# Patient Record
Sex: FEMALE | Race: WHITE | NOT HISPANIC OR LATINO | Employment: OTHER | URBAN - METROPOLITAN AREA
[De-identification: names, ages, dates, MRNs, and addresses within clinical notes are randomized per-mention and may not be internally consistent; named-entity substitution may affect disease eponyms.]

---

## 2017-02-01 ENCOUNTER — HISTORICAL (OUTPATIENT)
Dept: LAB | Facility: HOSPITAL | Age: 57
End: 2017-02-01

## 2017-07-03 ENCOUNTER — HISTORICAL (OUTPATIENT)
Dept: LAB | Facility: HOSPITAL | Age: 57
End: 2017-07-03

## 2017-07-03 LAB
EST. AVERAGE GLUCOSE BLD GHB EST-MCNC: 105 MG/DL
HBA1C MFR BLD: 5.3 % (ref 4.5–6.2)

## 2017-08-22 ENCOUNTER — HISTORICAL (OUTPATIENT)
Dept: RADIOLOGY | Facility: HOSPITAL | Age: 57
End: 2017-08-22

## 2017-12-08 ENCOUNTER — HISTORICAL (OUTPATIENT)
Dept: RADIOLOGY | Facility: HOSPITAL | Age: 57
End: 2017-12-08

## 2017-12-08 LAB
ABS NEUT (OLG): 6.36
ALBUMIN SERPL-MCNC: 3.6 GM/DL (ref 3.4–5)
ALBUMIN/GLOB SERPL: 1.1 RATIO (ref 1.1–2)
ALP SERPL-CCNC: 88 UNIT/L (ref 46–116)
ALT SERPL-CCNC: 25 UNIT/L (ref 12–78)
AST SERPL-CCNC: 14 UNIT/L (ref 10–37)
BASOPHILS # BLD AUTO: 0.03 X10(3)/MCL
BASOPHILS NFR BLD AUTO: 0.3 %
BILIRUB SERPL-MCNC: 0.3 MG/DL (ref 0.2–1)
BILIRUBIN DIRECT+TOT PNL SERPL-MCNC: 0.07 MG/DL (ref 0–0.2)
BILIRUBIN DIRECT+TOT PNL SERPL-MCNC: 0.22 MG/DL
BUN SERPL-MCNC: 21 MG/DL (ref 7–18)
CALCIUM SERPL-MCNC: 9.3 MG/DL (ref 8.5–10.1)
CHLORIDE SERPL-SCNC: 101 MMOL/L (ref 98–107)
CO2 SERPL-SCNC: 28.1 MMOL/L (ref 21–32)
CREAT SERPL-MCNC: 1.02 MG/DL (ref 0.55–1.02)
EOSINOPHIL # BLD AUTO: 0.33 X10(3)/MCL
EOSINOPHIL NFR BLD AUTO: 3.5 %
ERYTHROCYTE [DISTWIDTH] IN BLOOD BY AUTOMATED COUNT: 12 %
GLOBULIN SER-MCNC: 3.4 GM/DL (ref 2.4–3.5)
GLUCOSE SERPL-MCNC: 93 MG/DL (ref 74–106)
HCT VFR BLD AUTO: 41.4 % (ref 34–46)
HGB BLD-MCNC: 14 GM/DL (ref 11.3–15.4)
IMM GRANULOCYTES # BLD AUTO: 0.04 10*3/UL (ref 0–0.1)
IMM GRANULOCYTES NFR BLD AUTO: 0.4 % (ref 0–1)
LYMPHOCYTES # BLD AUTO: 1.95 X10(3)/MCL
LYMPHOCYTES NFR BLD AUTO: 20.6 %
MCH RBC QN AUTO: 31.3 PG (ref 27–33)
MCHC RBC AUTO-ENTMCNC: 33.8 GM/DL (ref 32–35)
MCV RBC AUTO: 92.6 FL (ref 81–97)
MONOCYTES # BLD AUTO: 0.77 X10(3)/MCL
MONOCYTES NFR BLD AUTO: 8.1 %
NEUTROPHILS # BLD AUTO: 6.36 X10(3)/MCL
NEUTROPHILS NFR BLD AUTO: 67.1 %
PLATELET # BLD AUTO: 208 X10(3)/MCL (ref 151–368)
PMV BLD AUTO: 10 FL
POTASSIUM SERPL-SCNC: 4 MMOL/L (ref 3.5–5.1)
PROT SERPL-MCNC: 7 GM/DL (ref 6.4–8.2)
RBC # BLD AUTO: 4.47 X10(6)/MCL (ref 3.9–5)
SODIUM SERPL-SCNC: 138 MMOL/L (ref 136–145)
WBC # SPEC AUTO: 9.48 X10(3)/MCL (ref 3.4–9.2)

## 2017-12-11 ENCOUNTER — HISTORICAL (OUTPATIENT)
Dept: LAB | Facility: HOSPITAL | Age: 57
End: 2017-12-11

## 2018-02-23 ENCOUNTER — HISTORICAL (OUTPATIENT)
Dept: LAB | Facility: HOSPITAL | Age: 58
End: 2018-02-23

## 2018-02-23 LAB
CHOLEST SERPL-MCNC: 250 MG/DL (ref 50–200)
CHOLEST/HDLC SERPL: 3 {RATIO} (ref 0–5)
HDLC SERPL-MCNC: 81 MG/DL (ref 35–60)
LDLC SERPL CALC-MCNC: 140 MG/DL (ref 50–140)
TRIGL SERPL-MCNC: 145 MG/DL (ref 30–150)
VLDLC SERPL CALC-MCNC: 29 MG/DL

## 2018-03-15 ENCOUNTER — HISTORICAL (OUTPATIENT)
Dept: RADIOLOGY | Facility: HOSPITAL | Age: 58
End: 2018-03-15

## 2018-03-23 ENCOUNTER — HISTORICAL (OUTPATIENT)
Dept: LAB | Facility: HOSPITAL | Age: 58
End: 2018-03-23

## 2018-03-23 LAB — PTH-INTACT SERPL-MCNC: 40.7 PG/ML (ref 18.4–80.1)

## 2018-04-12 ENCOUNTER — HISTORICAL (OUTPATIENT)
Dept: RADIOLOGY | Facility: HOSPITAL | Age: 58
End: 2018-04-12

## 2018-06-20 ENCOUNTER — HISTORICAL (OUTPATIENT)
Dept: LAB | Facility: HOSPITAL | Age: 58
End: 2018-06-20

## 2018-06-20 LAB
ABS NEUT (OLG): 5.28
ALBUMIN SERPL-MCNC: 3.7 GM/DL (ref 3.4–5)
ALBUMIN/GLOB SERPL: 1 RATIO (ref 1.1–2)
ALP SERPL-CCNC: 108 UNIT/L (ref 46–116)
ALT SERPL-CCNC: 28 UNIT/L (ref 12–78)
AST SERPL-CCNC: 16 UNIT/L (ref 10–37)
BASOPHILS # BLD AUTO: 0.07 X10(3)/MCL
BASOPHILS NFR BLD AUTO: 0.8 %
BILIRUB SERPL-MCNC: 0.3 MG/DL (ref 0.2–1)
BILIRUBIN DIRECT+TOT PNL SERPL-MCNC: 0.07 MG/DL (ref 0–0.2)
BILIRUBIN DIRECT+TOT PNL SERPL-MCNC: 0.23 MG/DL
BUN SERPL-MCNC: 17 MG/DL (ref 7–18)
CALCIUM SERPL-MCNC: 9.5 MG/DL (ref 8.5–10.1)
CHLORIDE SERPL-SCNC: 99 MMOL/L (ref 98–107)
CO2 SERPL-SCNC: 29.7 MMOL/L (ref 21–32)
CREAT SERPL-MCNC: 1.01 MG/DL (ref 0.55–1.02)
EOSINOPHIL # BLD AUTO: 0.47 X10(3)/MCL
EOSINOPHIL NFR BLD AUTO: 5.3 %
ERYTHROCYTE [DISTWIDTH] IN BLOOD BY AUTOMATED COUNT: 12 %
GLOBULIN SER-MCNC: 3.8 GM/DL (ref 2.4–3.5)
GLUCOSE SERPL-MCNC: 113 MG/DL (ref 74–106)
HCT VFR BLD AUTO: 42.9 % (ref 34–46)
HGB BLD-MCNC: 14.9 GM/DL (ref 11.3–15.4)
IMM GRANULOCYTES # BLD AUTO: 0.04 10*3/UL (ref 0–0.1)
IMM GRANULOCYTES NFR BLD AUTO: 0.5 % (ref 0–1)
LYMPHOCYTES # BLD AUTO: 2.14 X10(3)/MCL
LYMPHOCYTES NFR BLD AUTO: 24.3 %
MCH RBC QN AUTO: 31.8 PG (ref 27–33)
MCHC RBC AUTO-ENTMCNC: 34.7 GM/DL (ref 32–35)
MCV RBC AUTO: 91.5 FL (ref 81–97)
MONOCYTES # BLD AUTO: 0.81 X10(3)/MCL
MONOCYTES NFR BLD AUTO: 9.2 %
NEUTROPHILS # BLD AUTO: 5.28 X10(3)/MCL
NEUTROPHILS NFR BLD AUTO: 59.9 %
PLATELET # BLD AUTO: 214 X10(3)/MCL (ref 151–368)
PMV BLD AUTO: 10 FL
POTASSIUM SERPL-SCNC: 3.9 MMOL/L (ref 3.5–5.1)
PROT SERPL-MCNC: 7.5 GM/DL (ref 6.4–8.2)
RBC # BLD AUTO: 4.69 X10(6)/MCL (ref 3.9–5)
SODIUM SERPL-SCNC: 136 MMOL/L (ref 136–145)
WBC # SPEC AUTO: 8.81 X10(3)/MCL (ref 3.4–9.2)

## 2018-08-04 ENCOUNTER — HISTORICAL (OUTPATIENT)
Dept: LAB | Facility: HOSPITAL | Age: 58
End: 2018-08-04

## 2018-08-04 LAB
ALBUMIN SERPL-MCNC: 3.5 GM/DL (ref 3.4–5)
ALBUMIN/GLOB SERPL: 0.9 RATIO (ref 1.1–2)
ALP SERPL-CCNC: 106 UNIT/L (ref 46–116)
ALT SERPL-CCNC: 24 UNIT/L (ref 12–78)
AST SERPL-CCNC: 11 UNIT/L (ref 10–37)
BILIRUB SERPL-MCNC: 0.2 MG/DL (ref 0.2–1)
BILIRUBIN DIRECT+TOT PNL SERPL-MCNC: 0.1 MG/DL
BILIRUBIN DIRECT+TOT PNL SERPL-MCNC: 0.1 MG/DL (ref 0–0.2)
BUN SERPL-MCNC: 23 MG/DL (ref 7–18)
CALCIUM SERPL-MCNC: 9.2 MG/DL (ref 8.5–10.1)
CHLORIDE SERPL-SCNC: 104 MMOL/L (ref 98–107)
CHOLEST SERPL-MCNC: 251 MG/DL (ref 50–200)
CHOLEST/HDLC SERPL: 3 {RATIO} (ref 0–5)
CO2 SERPL-SCNC: 30.7 MMOL/L (ref 21–32)
CREAT SERPL-MCNC: 1.06 MG/DL (ref 0.55–1.02)
GLOBULIN SER-MCNC: 3.7 GM/DL (ref 2.4–3.5)
GLUCOSE SERPL-MCNC: 135 MG/DL (ref 74–106)
HDLC SERPL-MCNC: 82 MG/DL (ref 35–60)
LDLC SERPL CALC-MCNC: 147 MG/DL (ref 50–140)
POTASSIUM SERPL-SCNC: 4 MMOL/L (ref 3.5–5.1)
PROT SERPL-MCNC: 7.2 GM/DL (ref 6.4–8.2)
SODIUM SERPL-SCNC: 139 MMOL/L (ref 136–145)
TRIGL SERPL-MCNC: 108 MG/DL (ref 30–150)
VLDLC SERPL CALC-MCNC: 22 MG/DL

## 2018-09-18 ENCOUNTER — HISTORICAL (OUTPATIENT)
Dept: RADIOLOGY | Facility: HOSPITAL | Age: 58
End: 2018-09-18

## 2019-01-04 ENCOUNTER — HISTORICAL (OUTPATIENT)
Dept: LAB | Facility: HOSPITAL | Age: 59
End: 2019-01-04

## 2019-01-04 LAB
ABS NEUT (OLG): 4.49
ALBUMIN SERPL-MCNC: 3.9 GM/DL (ref 3.4–5)
ALBUMIN/GLOB SERPL: 1.1 RATIO (ref 1.1–2)
ALP SERPL-CCNC: 94 UNIT/L (ref 46–116)
ALT SERPL-CCNC: 33 UNIT/L (ref 12–78)
AST SERPL-CCNC: 18 UNIT/L (ref 10–37)
BASOPHILS # BLD AUTO: 0.04 X10(3)/MCL
BASOPHILS NFR BLD AUTO: 0.5 %
BILIRUB SERPL-MCNC: 0.4 MG/DL (ref 0.2–1)
BILIRUBIN DIRECT+TOT PNL SERPL-MCNC: 0.12 MG/DL (ref 0–0.2)
BILIRUBIN DIRECT+TOT PNL SERPL-MCNC: 0.28 MG/DL
BUN SERPL-MCNC: 20 MG/DL (ref 7–18)
CALCIUM SERPL-MCNC: 9.7 MG/DL (ref 8.5–10.1)
CHLORIDE SERPL-SCNC: 101 MMOL/L (ref 98–107)
CO2 SERPL-SCNC: 32.6 MMOL/L (ref 21–32)
CREAT SERPL-MCNC: 1.08 MG/DL (ref 0.55–1.02)
EOSINOPHIL # BLD AUTO: 0.29 X10(3)/MCL
EOSINOPHIL NFR BLD AUTO: 3.6 %
ERYTHROCYTE [DISTWIDTH] IN BLOOD BY AUTOMATED COUNT: 13 %
GLOBULIN SER-MCNC: 3.7 GM/DL (ref 2.4–3.5)
GLUCOSE SERPL-MCNC: 82 MG/DL (ref 74–106)
HCT VFR BLD AUTO: 45 % (ref 34–46)
HGB BLD-MCNC: 15.2 GM/DL (ref 11.3–15.4)
IMM GRANULOCYTES # BLD AUTO: 0.03 10*3/UL (ref 0–0.1)
IMM GRANULOCYTES NFR BLD AUTO: 0.4 % (ref 0–1)
LYMPHOCYTES # BLD AUTO: 2.4 X10(3)/MCL
LYMPHOCYTES NFR BLD AUTO: 30.2 %
MCH RBC QN AUTO: 31.8 PG (ref 27–33)
MCHC RBC AUTO-ENTMCNC: 33.8 GM/DL (ref 32–35)
MCV RBC AUTO: 94.1 FL (ref 81–97)
MONOCYTES # BLD AUTO: 0.7 X10(3)/MCL
MONOCYTES NFR BLD AUTO: 8.8 %
NEUTROPHILS # BLD AUTO: 4.49 X10(3)/MCL
NEUTROPHILS NFR BLD AUTO: 56.5 %
PLATELET # BLD AUTO: 202 X10(3)/MCL (ref 151–368)
PMV BLD AUTO: 10 FL
POTASSIUM SERPL-SCNC: 3.6 MMOL/L (ref 3.5–5.1)
PROT SERPL-MCNC: 7.6 GM/DL (ref 6.4–8.2)
RBC # BLD AUTO: 4.78 X10(6)/MCL (ref 3.9–5)
SODIUM SERPL-SCNC: 139 MMOL/L (ref 136–145)
WBC # SPEC AUTO: 7.95 X10(3)/MCL (ref 3.4–9.2)

## 2019-02-28 ENCOUNTER — HISTORICAL (OUTPATIENT)
Dept: RADIOLOGY | Facility: HOSPITAL | Age: 59
End: 2019-02-28

## 2019-03-19 ENCOUNTER — HISTORICAL (OUTPATIENT)
Dept: RADIOLOGY | Facility: HOSPITAL | Age: 59
End: 2019-03-19

## 2019-05-06 ENCOUNTER — HISTORICAL (OUTPATIENT)
Dept: RADIOLOGY | Facility: HOSPITAL | Age: 59
End: 2019-05-06

## 2019-08-15 ENCOUNTER — HISTORICAL (OUTPATIENT)
Dept: LAB | Facility: HOSPITAL | Age: 59
End: 2019-08-15

## 2019-08-15 LAB
ALBUMIN SERPL-MCNC: 3.6 GM/DL (ref 3.4–5)
ALBUMIN/GLOB SERPL: 1 RATIO (ref 1.1–2)
ALP SERPL-CCNC: 95 UNIT/L (ref 46–116)
ALT SERPL-CCNC: 32 UNIT/L (ref 12–78)
AST SERPL-CCNC: 16 UNIT/L (ref 10–37)
BILIRUB SERPL-MCNC: 0.4 MG/DL (ref 0.2–1)
BILIRUBIN DIRECT+TOT PNL SERPL-MCNC: 0.14 MG/DL (ref 0–0.2)
BILIRUBIN DIRECT+TOT PNL SERPL-MCNC: 0.26 MG/DL
BUN SERPL-MCNC: 16 MG/DL (ref 7–18)
CALCIUM SERPL-MCNC: 9.2 MG/DL (ref 8.5–10.1)
CHLORIDE SERPL-SCNC: 103 MMOL/L (ref 98–107)
CHOLEST SERPL-MCNC: 249 MG/DL (ref 50–200)
CHOLEST/HDLC SERPL: 3 {RATIO} (ref 0–5)
CO2 SERPL-SCNC: 30.1 MMOL/L (ref 21–32)
CREAT SERPL-MCNC: 0.88 MG/DL (ref 0.55–1.02)
GLOBULIN SER-MCNC: 3.7 GM/DL (ref 2.4–3.5)
GLUCOSE SERPL-MCNC: 112 MG/DL (ref 74–106)
HDLC SERPL-MCNC: 75 MG/DL (ref 35–60)
LDLC SERPL CALC-MCNC: 149 MG/DL (ref 50–140)
POTASSIUM SERPL-SCNC: 3.7 MMOL/L (ref 3.5–5.1)
PROT SERPL-MCNC: 7.3 GM/DL (ref 6.4–8.2)
SODIUM SERPL-SCNC: 141 MMOL/L (ref 136–145)
TRIGL SERPL-MCNC: 125 MG/DL (ref 30–150)
VLDLC SERPL CALC-MCNC: 25 MG/DL

## 2019-11-06 ENCOUNTER — HISTORICAL (OUTPATIENT)
Dept: LAB | Facility: HOSPITAL | Age: 59
End: 2019-11-06

## 2019-11-06 LAB
ABS NEUT (OLG): 5.49
ALBUMIN SERPL-MCNC: 3.6 GM/DL (ref 3.4–5)
ALBUMIN/GLOB SERPL: 1 RATIO (ref 1.1–2)
ALP SERPL-CCNC: 98 UNIT/L (ref 46–116)
ALT SERPL-CCNC: 34 UNIT/L (ref 12–78)
AST SERPL-CCNC: 21 UNIT/L (ref 10–37)
BASOPHILS # BLD AUTO: 0.05 X10(3)/MCL
BASOPHILS NFR BLD AUTO: 0.6 %
BILIRUB SERPL-MCNC: 0.4 MG/DL (ref 0.2–1)
BILIRUBIN DIRECT+TOT PNL SERPL-MCNC: 0.1 MG/DL (ref 0–0.2)
BILIRUBIN DIRECT+TOT PNL SERPL-MCNC: 0.3 MG/DL
BUN SERPL-MCNC: 18 MG/DL (ref 7–18)
CALCIUM SERPL-MCNC: 9.8 MG/DL (ref 8.5–10.1)
CHLORIDE SERPL-SCNC: 100 MMOL/L (ref 98–107)
CO2 SERPL-SCNC: 31.8 MMOL/L (ref 21–32)
CREAT SERPL-MCNC: 0.86 MG/DL (ref 0.55–1.02)
EOSINOPHIL # BLD AUTO: 0.25 X10(3)/MCL
EOSINOPHIL NFR BLD AUTO: 2.9 %
ERYTHROCYTE [DISTWIDTH] IN BLOOD BY AUTOMATED COUNT: 13 %
GLOBULIN SER-MCNC: 3.7 GM/DL (ref 2.4–3.5)
GLUCOSE SERPL-MCNC: 83 MG/DL (ref 74–106)
HCT VFR BLD AUTO: 42.9 % (ref 34–46)
HGB BLD-MCNC: 14.5 GM/DL (ref 11.3–15.4)
IMM GRANULOCYTES # BLD AUTO: 0.04 10*3/UL (ref 0–0.1)
IMM GRANULOCYTES NFR BLD AUTO: 0.5 % (ref 0–1)
LYMPHOCYTES # BLD AUTO: 2.15 X10(3)/MCL
LYMPHOCYTES NFR BLD AUTO: 24.7 %
MCH RBC QN AUTO: 31.4 PG (ref 27–33)
MCHC RBC AUTO-ENTMCNC: 33.8 GM/DL (ref 32–35)
MCV RBC AUTO: 92.9 FL (ref 81–97)
MONOCYTES # BLD AUTO: 0.71 X10(3)/MCL
MONOCYTES NFR BLD AUTO: 8.2 %
NEUTROPHILS # BLD AUTO: 5.49 X10(3)/MCL
NEUTROPHILS NFR BLD AUTO: 63.1 %
PLATELET # BLD AUTO: 223 X10(3)/MCL (ref 140–450)
PMV BLD AUTO: 10 FL
POTASSIUM SERPL-SCNC: 3.8 MMOL/L (ref 3.5–5.1)
PROT SERPL-MCNC: 7.3 GM/DL (ref 6.4–8.2)
RBC # BLD AUTO: 4.62 X10(6)/MCL (ref 3.9–5)
SODIUM SERPL-SCNC: 138 MMOL/L (ref 136–145)
WBC # SPEC AUTO: 8.69 X10(3)/MCL (ref 3.4–9.2)

## 2020-02-17 ENCOUNTER — HISTORICAL (OUTPATIENT)
Dept: LAB | Facility: HOSPITAL | Age: 60
End: 2020-02-17

## 2020-02-17 LAB
ALBUMIN SERPL-MCNC: 3.9 GM/DL (ref 3.5–5.2)
ALBUMIN/GLOB SERPL: 1.1 RATIO (ref 1.1–2)
ALP SERPL-CCNC: 95 UNIT/L (ref 40–150)
ALT SERPL-CCNC: 21 UNIT/L (ref 0–55)
AST SERPL-CCNC: 15 UNIT/L (ref 5–34)
BILIRUB SERPL-MCNC: 0.5 MG/DL
BILIRUBIN DIRECT+TOT PNL SERPL-MCNC: 0.2 MG/DL (ref 0–0.5)
BILIRUBIN DIRECT+TOT PNL SERPL-MCNC: 0.3 MG/DL
BUN SERPL-MCNC: 16 MG/DL (ref 9.8–20.1)
CALCIUM SERPL-MCNC: 9.9 MG/DL (ref 8.4–10.2)
CHLORIDE SERPL-SCNC: 99 MMOL/L (ref 98–107)
CHOLEST SERPL-MCNC: 213 MG/DL
CHOLEST/HDLC SERPL: 3 {RATIO} (ref 0–5)
CO2 SERPL-SCNC: 30 MEQ/L (ref 22–29)
CREAT SERPL-MCNC: 0.73 MG/DL (ref 0.55–1.02)
GLOBULIN SER-MCNC: 3.4 GM/DL (ref 2.4–3.5)
GLUCOSE SERPL-MCNC: 107 MG/DL (ref 74–100)
HDLC SERPL-MCNC: 73 MG/DL
LDLC SERPL CALC-MCNC: 102 MG/DL (ref 50–140)
POTASSIUM SERPL-SCNC: 3.5 MMOL/L (ref 3.5–5.1)
PROT SERPL-MCNC: 7.3 GM/DL (ref 6.4–8.3)
SODIUM SERPL-SCNC: 138 MMOL/L (ref 136–145)
TRIGL SERPL-MCNC: 190 MG/DL (ref 37–140)
VLDLC SERPL CALC-MCNC: 38 MG/DL

## 2020-05-28 ENCOUNTER — HISTORICAL (OUTPATIENT)
Dept: LAB | Facility: HOSPITAL | Age: 60
End: 2020-05-28

## 2020-05-28 ENCOUNTER — HISTORICAL (OUTPATIENT)
Dept: RADIOLOGY | Facility: HOSPITAL | Age: 60
End: 2020-05-28

## 2020-05-28 LAB
ABS NEUT (OLG): 5.96
ALBUMIN SERPL-MCNC: 3.9 GM/DL (ref 3.5–5)
ALBUMIN/GLOB SERPL: 1.1 RATIO (ref 1.1–2)
ALP SERPL-CCNC: 101 UNIT/L (ref 40–150)
ALP SERPL-CCNC: 103 UNIT/L (ref 40–150)
ALT SERPL-CCNC: 26 UNIT/L (ref 0–55)
AST SERPL-CCNC: 17 UNIT/L (ref 5–34)
BASOPHILS # BLD AUTO: 0.04 X10(3)/MCL
BASOPHILS NFR BLD AUTO: 0.4 %
BILIRUB SERPL-MCNC: 0.3 MG/DL
BILIRUBIN DIRECT+TOT PNL SERPL-MCNC: 0.1 MG/DL (ref 0–0.5)
BILIRUBIN DIRECT+TOT PNL SERPL-MCNC: 0.2 MG/DL
BUN SERPL-MCNC: 17 MG/DL (ref 9.8–20.1)
CALCIUM SERPL-MCNC: 9.5 MG/DL (ref 8.4–10.2)
CHLORIDE SERPL-SCNC: 102 MMOL/L (ref 98–107)
CO2 SERPL-SCNC: 30 MEQ/L (ref 22–29)
CREAT SERPL-MCNC: 0.94 MG/DL (ref 0.55–1.02)
EOSINOPHIL # BLD AUTO: 0.44 X10(3)/MCL
EOSINOPHIL NFR BLD AUTO: 4.7 %
ERYTHROCYTE [DISTWIDTH] IN BLOOD BY AUTOMATED COUNT: 13 %
GLOBULIN SER-MCNC: 3.4 GM/DL (ref 2.4–3.5)
GLUCOSE SERPL-MCNC: 106 MG/DL (ref 74–100)
HCT VFR BLD AUTO: 47.4 % (ref 34–46)
HGB BLD-MCNC: 16 GM/DL (ref 11.3–15.4)
IMM GRANULOCYTES # BLD AUTO: 0.04 10*3/UL (ref 0–0.1)
IMM GRANULOCYTES NFR BLD AUTO: 0.4 % (ref 0–1)
LYMPHOCYTES # BLD AUTO: 2.13 X10(3)/MCL
LYMPHOCYTES NFR BLD AUTO: 22.7 %
MCH RBC QN AUTO: 31.7 PG (ref 27–33)
MCHC RBC AUTO-ENTMCNC: 33.8 GM/DL (ref 32–35)
MCV RBC AUTO: 93.9 FL (ref 81–97)
MONOCYTES # BLD AUTO: 0.79 X10(3)/MCL
MONOCYTES NFR BLD AUTO: 8.4 %
NEUTROPHILS # BLD AUTO: 5.96 X10(3)/MCL
NEUTROPHILS NFR BLD AUTO: 63.4 %
PLATELET # BLD AUTO: 233 X10(3)/MCL (ref 140–450)
PMV BLD AUTO: 11 FL
POTASSIUM SERPL-SCNC: 4.2 MMOL/L (ref 3.5–5.1)
PROT SERPL-MCNC: 7.3 GM/DL (ref 6.4–8.3)
RBC # BLD AUTO: 5.05 X10(6)/MCL (ref 3.9–5)
SODIUM SERPL-SCNC: 140 MMOL/L (ref 136–145)
WBC # SPEC AUTO: 9.4 X10(3)/MCL (ref 3.4–9.2)

## 2020-07-24 ENCOUNTER — HISTORICAL (OUTPATIENT)
Dept: LAB | Facility: HOSPITAL | Age: 60
End: 2020-07-24

## 2020-07-24 LAB
ABS NEUT (OLG): 4.33
ALBUMIN SERPL-MCNC: 3.8 GM/DL (ref 3.5–5)
ALBUMIN/GLOB SERPL: 1.2 RATIO (ref 1.1–2)
ALP SERPL-CCNC: 92 UNIT/L (ref 40–150)
ALT SERPL-CCNC: 21 UNIT/L (ref 0–55)
AST SERPL-CCNC: 16 UNIT/L (ref 5–34)
BASOPHILS # BLD AUTO: 0.03 X10(3)/MCL
BASOPHILS NFR BLD AUTO: 0.4 %
BILIRUB SERPL-MCNC: 0.4 MG/DL
BILIRUBIN DIRECT+TOT PNL SERPL-MCNC: 0.1 MG/DL (ref 0–0.5)
BILIRUBIN DIRECT+TOT PNL SERPL-MCNC: 0.3 MG/DL
BUN SERPL-MCNC: 14 MG/DL (ref 9.8–20.1)
CALCIUM SERPL-MCNC: 9.9 MG/DL (ref 8.4–10.2)
CHLORIDE SERPL-SCNC: 102 MMOL/L (ref 98–107)
CHOLEST SERPL-MCNC: 228 MG/DL
CHOLEST/HDLC SERPL: 3 {RATIO} (ref 0–5)
CO2 SERPL-SCNC: 30 MEQ/L (ref 22–29)
CREAT SERPL-MCNC: 0.87 MG/DL (ref 0.55–1.02)
EOSINOPHIL # BLD AUTO: 0.31 X10(3)/MCL
EOSINOPHIL NFR BLD AUTO: 4.2 %
ERYTHROCYTE [DISTWIDTH] IN BLOOD BY AUTOMATED COUNT: 13 %
GLOBULIN SER-MCNC: 3.2 GM/DL (ref 2.4–3.5)
GLUCOSE SERPL-MCNC: 121 MG/DL (ref 74–100)
HCT VFR BLD AUTO: 46.3 % (ref 34–46)
HDLC SERPL-MCNC: 81 MG/DL (ref 35–60)
HGB BLD-MCNC: 15.5 GM/DL (ref 11.3–15.4)
IMM GRANULOCYTES # BLD AUTO: 0.02 10*3/UL (ref 0–0.1)
IMM GRANULOCYTES NFR BLD AUTO: 0.3 % (ref 0–1)
LDLC SERPL CALC-MCNC: 107 MG/DL (ref 50–140)
LYMPHOCYTES # BLD AUTO: 2 X10(3)/MCL
LYMPHOCYTES NFR BLD AUTO: 27.2 %
MCH RBC QN AUTO: 31.7 PG (ref 27–33)
MCHC RBC AUTO-ENTMCNC: 33.5 GM/DL (ref 32–35)
MCV RBC AUTO: 94.7 FL (ref 81–97)
MONOCYTES # BLD AUTO: 0.65 X10(3)/MCL
MONOCYTES NFR BLD AUTO: 8.9 %
NEUTROPHILS # BLD AUTO: 4.33 X10(3)/MCL
NEUTROPHILS NFR BLD AUTO: 59 %
PLATELET # BLD AUTO: 219 X10(3)/MCL (ref 140–450)
PMV BLD AUTO: 11 FL
POTASSIUM SERPL-SCNC: 3.8 MMOL/L (ref 3.5–5.1)
PROT SERPL-MCNC: 7 GM/DL (ref 6.4–8.3)
RBC # BLD AUTO: 4.89 X10(6)/MCL (ref 3.9–5)
SODIUM SERPL-SCNC: 143 MMOL/L (ref 136–145)
TRIGL SERPL-MCNC: 200 MG/DL (ref 37–140)
VLDLC SERPL CALC-MCNC: 40 MG/DL
WBC # SPEC AUTO: 7.34 X10(3)/MCL (ref 3.4–9.2)

## 2020-08-25 ENCOUNTER — HISTORICAL (OUTPATIENT)
Dept: LAB | Facility: HOSPITAL | Age: 60
End: 2020-08-25

## 2020-08-25 LAB
ABS NEUT (OLG): 4.27
ALBUMIN SERPL-MCNC: 3.7 GM/DL (ref 3.5–5)
ALBUMIN/GLOB SERPL: 1.1 RATIO (ref 1.1–2)
ALP SERPL-CCNC: 102 UNIT/L (ref 40–150)
ALT SERPL-CCNC: 26 UNIT/L (ref 0–55)
AST SERPL-CCNC: 21 UNIT/L (ref 5–34)
BASOPHILS # BLD AUTO: 0.04 X10(3)/MCL
BASOPHILS NFR BLD AUTO: 0.5 %
BILIRUB SERPL-MCNC: 0.4 MG/DL
BILIRUBIN DIRECT+TOT PNL SERPL-MCNC: 0.1 MG/DL (ref 0–0.5)
BILIRUBIN DIRECT+TOT PNL SERPL-MCNC: 0.3 MG/DL
BUN SERPL-MCNC: 17 MG/DL (ref 9.8–20.1)
CALCIUM SERPL-MCNC: 9.9 MG/DL (ref 8.4–10.2)
CHLORIDE SERPL-SCNC: 102 MMOL/L (ref 98–107)
CHOLEST SERPL-MCNC: 239 MG/DL
CHOLEST/HDLC SERPL: 3 {RATIO} (ref 0–5)
CO2 SERPL-SCNC: 30 MEQ/L (ref 22–29)
CREAT SERPL-MCNC: 0.84 MG/DL (ref 0.55–1.02)
EOSINOPHIL # BLD AUTO: 0.36 X10(3)/MCL
EOSINOPHIL NFR BLD AUTO: 4.5 %
ERYTHROCYTE [DISTWIDTH] IN BLOOD BY AUTOMATED COUNT: 13 %
GLOBULIN SER-MCNC: 3.5 GM/DL (ref 2.4–3.5)
GLUCOSE SERPL-MCNC: 111 MG/DL (ref 74–100)
HCT VFR BLD AUTO: 45.9 % (ref 34–46)
HDLC SERPL-MCNC: 73 MG/DL (ref 35–60)
HGB BLD-MCNC: 15.6 GM/DL (ref 11.3–15.4)
IMM GRANULOCYTES # BLD AUTO: 0.02 10*3/UL (ref 0–0.1)
IMM GRANULOCYTES NFR BLD AUTO: 0.3 % (ref 0–1)
LDLC SERPL CALC-MCNC: 113 MG/DL (ref 50–140)
LYMPHOCYTES # BLD AUTO: 2.45 X10(3)/MCL
LYMPHOCYTES NFR BLD AUTO: 30.8 %
MCH RBC QN AUTO: 31.9 PG (ref 27–33)
MCHC RBC AUTO-ENTMCNC: 34 GM/DL (ref 32–35)
MCV RBC AUTO: 93.9 FL (ref 81–97)
MONOCYTES # BLD AUTO: 0.81 X10(3)/MCL
MONOCYTES NFR BLD AUTO: 10.2 %
NEUTROPHILS # BLD AUTO: 4.27 X10(3)/MCL
NEUTROPHILS NFR BLD AUTO: 53.7 %
PLATELET # BLD AUTO: 244 X10(3)/MCL (ref 140–450)
PMV BLD AUTO: 11 FL
POTASSIUM SERPL-SCNC: 4 MMOL/L (ref 3.5–5.1)
PROT SERPL-MCNC: 7.2 GM/DL (ref 6.4–8.3)
RBC # BLD AUTO: 4.89 X10(6)/MCL (ref 3.9–5)
SODIUM SERPL-SCNC: 142 MMOL/L (ref 136–145)
TRIGL SERPL-MCNC: 264 MG/DL (ref 37–140)
VLDLC SERPL CALC-MCNC: 53 MG/DL
WBC # SPEC AUTO: 7.95 X10(3)/MCL (ref 3.4–9.2)

## 2020-10-30 ENCOUNTER — HISTORICAL (OUTPATIENT)
Dept: LAB | Facility: HOSPITAL | Age: 60
End: 2020-10-30

## 2020-10-30 LAB
ABS NEUT (OLG): 4.65
ALBUMIN SERPL-MCNC: 3.8 GM/DL (ref 3.5–5)
ALBUMIN/GLOB SERPL: 1.2 RATIO (ref 1.1–2)
ALP SERPL-CCNC: 106 UNIT/L (ref 40–150)
ALT SERPL-CCNC: 23 UNIT/L (ref 0–55)
AST SERPL-CCNC: 17 UNIT/L (ref 5–34)
BASOPHILS # BLD AUTO: 0.05 X10(3)/MCL
BASOPHILS NFR BLD AUTO: 0.6 %
BILIRUB SERPL-MCNC: 0.4 MG/DL
BILIRUBIN DIRECT+TOT PNL SERPL-MCNC: 0.1 MG/DL (ref 0–0.5)
BILIRUBIN DIRECT+TOT PNL SERPL-MCNC: 0.3 MG/DL
BUN SERPL-MCNC: 15 MG/DL (ref 9.8–20.1)
CALCIUM SERPL-MCNC: 10 MG/DL (ref 8.4–10.2)
CHLORIDE SERPL-SCNC: 103 MMOL/L (ref 98–107)
CHOLEST SERPL-MCNC: 241 MG/DL
CHOLEST/HDLC SERPL: 4 {RATIO} (ref 0–5)
CO2 SERPL-SCNC: 30 MEQ/L (ref 22–29)
CREAT SERPL-MCNC: 0.82 MG/DL (ref 0.55–1.02)
EOSINOPHIL # BLD AUTO: 0.33 X10(3)/MCL
EOSINOPHIL NFR BLD AUTO: 4.1 %
ERYTHROCYTE [DISTWIDTH] IN BLOOD BY AUTOMATED COUNT: 13 %
GLOBULIN SER-MCNC: 3.3 GM/DL (ref 2.4–3.5)
GLUCOSE SERPL-MCNC: 113 MG/DL (ref 74–100)
HCT VFR BLD AUTO: 46.8 % (ref 34–46)
HDLC SERPL-MCNC: 60 MG/DL (ref 35–60)
HGB BLD-MCNC: 15.7 GM/DL (ref 11.3–15.4)
IMM GRANULOCYTES # BLD AUTO: 0.02 10*3/UL (ref 0–0.1)
IMM GRANULOCYTES NFR BLD AUTO: 0.2 % (ref 0–1)
LDLC SERPL CALC-MCNC: ABNORMAL MG/DL (ref 50–140)
LYMPHOCYTES # BLD AUTO: 2.25 X10(3)/MCL
LYMPHOCYTES NFR BLD AUTO: 27.7 %
MCH RBC QN AUTO: 31.3 PG (ref 27–33)
MCHC RBC AUTO-ENTMCNC: 33.5 GM/DL (ref 32–35)
MCV RBC AUTO: 93.2 FL (ref 81–97)
MONOCYTES # BLD AUTO: 0.82 X10(3)/MCL
MONOCYTES NFR BLD AUTO: 10.1 %
NEUTROPHILS # BLD AUTO: 4.65 X10(3)/MCL
NEUTROPHILS NFR BLD AUTO: 57.3 %
PLATELET # BLD AUTO: 242 X10(3)/MCL (ref 140–450)
PMV BLD AUTO: 11 FL
POTASSIUM SERPL-SCNC: 4.2 MMOL/L (ref 3.5–5.1)
PROT SERPL-MCNC: 7.1 GM/DL (ref 6.4–8.3)
RBC # BLD AUTO: 5.02 X10(6)/MCL (ref 3.9–5)
SODIUM SERPL-SCNC: 142 MMOL/L (ref 136–145)
TRIGL SERPL-MCNC: 432 MG/DL (ref 37–140)
VLDLC SERPL CALC-MCNC: 86 MG/DL
WBC # SPEC AUTO: 8.12 X10(3)/MCL (ref 3.4–9.2)

## 2021-01-28 ENCOUNTER — HISTORICAL (OUTPATIENT)
Dept: LAB | Facility: HOSPITAL | Age: 61
End: 2021-01-28

## 2021-01-28 LAB
ABS NEUT (OLG): 4.77
ALBUMIN SERPL-MCNC: 3.7 GM/DL (ref 3.5–5)
ALBUMIN/GLOB SERPL: 1.2 RATIO (ref 1.1–2)
ALP SERPL-CCNC: 115 UNIT/L (ref 40–150)
ALT SERPL-CCNC: 21 UNIT/L (ref 0–55)
APPEARANCE, UA: CLEAR
AST SERPL-CCNC: 18 UNIT/L (ref 5–34)
BACTERIA #/AREA URNS AUTO: ABNORMAL /HPF
BASOPHILS # BLD AUTO: 0.04 X10(3)/MCL
BASOPHILS NFR BLD AUTO: 0.5 %
BILIRUB SERPL-MCNC: 0.3 MG/DL
BILIRUB UR QL STRIP: NEGATIVE
BILIRUBIN DIRECT+TOT PNL SERPL-MCNC: 0.1 MG/DL (ref 0–0.5)
BILIRUBIN DIRECT+TOT PNL SERPL-MCNC: 0.2 MG/DL
BUN SERPL-MCNC: 17 MG/DL (ref 9.8–20.1)
CALCIUM SERPL-MCNC: 9.7 MG/DL (ref 8.4–10.2)
CHLORIDE SERPL-SCNC: 104 MMOL/L (ref 98–107)
CHOLEST SERPL-MCNC: 205 MG/DL
CHOLEST/HDLC SERPL: 3 {RATIO} (ref 0–5)
CO2 SERPL-SCNC: 31 MEQ/L (ref 22–29)
COLOR UR: YELLOW
CREAT SERPL-MCNC: 0.94 MG/DL (ref 0.55–1.02)
EOSINOPHIL # BLD AUTO: 0.31 X10(3)/MCL
EOSINOPHIL NFR BLD AUTO: 3.8 %
ERYTHROCYTE [DISTWIDTH] IN BLOOD BY AUTOMATED COUNT: 13 %
GLOBULIN SER-MCNC: 3.2 GM/DL (ref 2.4–3.5)
GLUCOSE (UA): NEGATIVE
GLUCOSE SERPL-MCNC: 120 MG/DL (ref 74–100)
HCT VFR BLD AUTO: 45.3 % (ref 34–46)
HDLC SERPL-MCNC: 73 MG/DL (ref 35–60)
HGB BLD-MCNC: 14.9 GM/DL (ref 11.3–15.4)
HGB UR QL STRIP: ABNORMAL
IMM GRANULOCYTES # BLD AUTO: 0.02 10*3/UL (ref 0–0.1)
IMM GRANULOCYTES NFR BLD AUTO: 0.2 % (ref 0–1)
KETONES UR QL STRIP: NEGATIVE
LDLC SERPL CALC-MCNC: 105 MG/DL (ref 50–140)
LEUKOCYTE ESTERASE UR QL STRIP: NEGATIVE
LYMPHOCYTES # BLD AUTO: 2.21 X10(3)/MCL
LYMPHOCYTES NFR BLD AUTO: 27.4 %
MCH RBC QN AUTO: 30.9 PG (ref 27–33)
MCHC RBC AUTO-ENTMCNC: 32.9 GM/DL (ref 32–35)
MCV RBC AUTO: 94 FL (ref 81–97)
MONOCYTES # BLD AUTO: 0.71 X10(3)/MCL
MONOCYTES NFR BLD AUTO: 8.8 %
NEUTROPHILS # BLD AUTO: 4.77 X10(3)/MCL
NEUTROPHILS NFR BLD AUTO: 59.3 %
NITRITE UR QL STRIP.AUTO: NEGATIVE
PH UR STRIP: 6.5 [PH] (ref 4.6–8)
PLATELET # BLD AUTO: 219 X10(3)/MCL (ref 140–450)
PMV BLD AUTO: 11 FL
POTASSIUM SERPL-SCNC: 4.7 MMOL/L (ref 3.5–5.1)
PROT SERPL-MCNC: 6.9 GM/DL (ref 6.4–8.3)
PROT UR QL STRIP: ABNORMAL
RBC # BLD AUTO: 4.82 X10(6)/MCL (ref 3.9–5)
RBC #/AREA URNS HPF: ABNORMAL /[HPF]
SODIUM SERPL-SCNC: 143 MMOL/L (ref 136–145)
SP GR UR STRIP: 1.02 (ref 1–1.03)
SQUAMOUS EPITHELIAL, UA: ABNORMAL
TRIGL SERPL-MCNC: 137 MG/DL (ref 37–140)
UROBILINOGEN UR STRIP-ACNC: 0.2
VLDLC SERPL CALC-MCNC: 27 MG/DL
WBC # SPEC AUTO: 8.06 X10(3)/MCL (ref 3.4–9.2)
WBC #/AREA URNS AUTO: ABNORMAL /HPF

## 2021-07-12 ENCOUNTER — HISTORICAL (OUTPATIENT)
Dept: LAB | Facility: HOSPITAL | Age: 61
End: 2021-07-12

## 2021-07-12 LAB
ABS NEUT (OLG): 5.04
ALBUMIN SERPL-MCNC: 4.3 GM/DL (ref 3.5–5)
ALBUMIN/GLOB SERPL: 1.1 RATIO (ref 1.1–2)
ALP SERPL-CCNC: 124 UNIT/L (ref 40–150)
ALT SERPL-CCNC: 24 UNIT/L (ref 0–55)
AST SERPL-CCNC: 23 UNIT/L (ref 5–34)
BASOPHILS # BLD AUTO: 0.04 X10(3)/MCL
BASOPHILS NFR BLD AUTO: 0.5 %
BILIRUB SERPL-MCNC: 0.4 MG/DL
BILIRUBIN DIRECT+TOT PNL SERPL-MCNC: 0.1 MG/DL (ref 0–0.5)
BILIRUBIN DIRECT+TOT PNL SERPL-MCNC: 0.3 MG/DL
BUN SERPL-MCNC: 18 MG/DL (ref 9.8–20.1)
CALCIUM SERPL-MCNC: 10.9 MG/DL (ref 8.4–10.2)
CHLORIDE SERPL-SCNC: 98 MMOL/L (ref 98–107)
CO2 SERPL-SCNC: 29 MEQ/L (ref 22–29)
CREAT SERPL-MCNC: 0.76 MG/DL (ref 0.55–1.02)
EOSINOPHIL # BLD AUTO: 0.23 X10(3)/MCL
EOSINOPHIL NFR BLD AUTO: 2.9 %
ERYTHROCYTE [DISTWIDTH] IN BLOOD BY AUTOMATED COUNT: 13 %
GLOBULIN SER-MCNC: 3.8 GM/DL (ref 2.4–3.5)
GLUCOSE SERPL-MCNC: 104 MG/DL (ref 74–100)
HCT VFR BLD AUTO: 47.6 % (ref 34–46)
HGB BLD-MCNC: 16.1 GM/DL (ref 11.3–15.4)
IMM GRANULOCYTES # BLD AUTO: 0.01 10*3/UL (ref 0–0.1)
IMM GRANULOCYTES NFR BLD AUTO: 0.1 % (ref 0–1)
LYMPHOCYTES # BLD AUTO: 2.11 X10(3)/MCL
LYMPHOCYTES NFR BLD AUTO: 26.2 %
MCH RBC QN AUTO: 31.2 PG (ref 27–33)
MCHC RBC AUTO-ENTMCNC: 33.8 GM/DL (ref 32–35)
MCV RBC AUTO: 92.2 FL (ref 81–97)
MONOCYTES # BLD AUTO: 0.63 X10(3)/MCL
MONOCYTES NFR BLD AUTO: 7.8 %
NEUTROPHILS # BLD AUTO: 5.04 X10(3)/MCL
NEUTROPHILS NFR BLD AUTO: 62.5 %
PLATELET # BLD AUTO: 230 X10(3)/MCL (ref 140–450)
PMV BLD AUTO: 11 FL
POTASSIUM SERPL-SCNC: 4 MMOL/L (ref 3.5–5.1)
PROT SERPL-MCNC: 8.1 GM/DL (ref 6.4–8.3)
RBC # BLD AUTO: 5.16 X10(6)/MCL (ref 3.9–5)
SODIUM SERPL-SCNC: 136 MMOL/L (ref 136–145)
WBC # SPEC AUTO: 8.06 X10(3)/MCL (ref 3.4–9.2)

## 2021-08-27 ENCOUNTER — HISTORICAL (OUTPATIENT)
Dept: LAB | Facility: HOSPITAL | Age: 61
End: 2021-08-27

## 2021-08-27 LAB
CALCIUM SERPL-MCNC: 9.8 MG/DL (ref 8.4–10.2)
CHOLEST SERPL-MCNC: 185 MG/DL
CHOLEST/HDLC SERPL: 3 {RATIO} (ref 0–5)
EST. AVERAGE GLUCOSE BLD GHB EST-MCNC: 105 MG/DL
HBA1C MFR BLD: 5.3 % (ref 4–6)
HDLC SERPL-MCNC: 72 MG/DL (ref 35–60)
LDLC SERPL CALC-MCNC: 85 MG/DL (ref 50–140)
PTH-INTACT SERPL-MCNC: 67.3 PG/ML (ref 8.7–77.1)
TRIGL SERPL-MCNC: 141 MG/DL (ref 37–140)
VLDLC SERPL CALC-MCNC: 28 MG/DL

## 2021-09-24 ENCOUNTER — HISTORICAL (OUTPATIENT)
Dept: RADIOLOGY | Facility: HOSPITAL | Age: 61
End: 2021-09-24

## 2021-10-13 ENCOUNTER — HISTORICAL (OUTPATIENT)
Dept: LAB | Facility: HOSPITAL | Age: 61
End: 2021-10-13

## 2021-10-13 LAB — TSH SERPL-ACNC: <0.0083 UIU/ML (ref 0.35–4.94)

## 2022-03-07 ENCOUNTER — HISTORICAL (OUTPATIENT)
Dept: LAB | Facility: HOSPITAL | Age: 62
End: 2022-03-07

## 2022-03-07 LAB
ABS NEUT (OLG): 4.4
ALBUMIN SERPL-MCNC: 3.7 G/DL (ref 3.4–4.8)
ALBUMIN/GLOB SERPL: 1.2 {RATIO} (ref 1.1–2)
ALP SERPL-CCNC: 97 U/L (ref 40–150)
ALT SERPL-CCNC: 25 U/L (ref 0–55)
AST SERPL-CCNC: 18 U/L (ref 5–34)
BASOPHILS # BLD AUTO: 0.02 10*3/UL
BASOPHILS NFR BLD AUTO: 0.3 %
BILIRUB SERPL-MCNC: 0.5 MG/DL
BILIRUBIN DIRECT+TOT PNL SERPL-MCNC: 0.2 (ref 0–0.5)
BILIRUBIN DIRECT+TOT PNL SERPL-MCNC: 0.3
BUN SERPL-MCNC: 16 MG/DL (ref 9.8–20.1)
CALCIUM SERPL-MCNC: 9.5 MG/DL (ref 8.7–10.5)
CHLORIDE SERPL-SCNC: 102 MMOL/L (ref 98–107)
CO2 SERPL-SCNC: 33 MMOL/L (ref 23–31)
CREAT SERPL-MCNC: 0.75 MG/DL (ref 0.55–1.02)
EOSINOPHIL # BLD AUTO: 0.21 10*3/UL
EOSINOPHIL NFR BLD AUTO: 2.9 %
ERYTHROCYTE [DISTWIDTH] IN BLOOD BY AUTOMATED COUNT: 13 %
GLOBULIN SER-MCNC: 3.2 G/DL (ref 2.4–3.5)
GLUCOSE SERPL-MCNC: 108 MG/DL (ref 82–115)
HCT VFR BLD AUTO: 46.3 % (ref 34–46)
HEMOLYSIS INTERF INDEX SERPL-ACNC: 4
HGB BLD-MCNC: 15.4 G/DL (ref 11.3–15.4)
ICTERIC INTERF INDEX SERPL-ACNC: 1
IMM GRANULOCYTES # BLD AUTO: 0.02 10*3/UL (ref 0–0.1)
IMM GRANULOCYTES NFR BLD AUTO: 0.3 % (ref 0–1)
LIPEMIC INTERF INDEX SERPL-ACNC: 2
LYMPHOCYTES # BLD AUTO: 1.89 10*3/UL
LYMPHOCYTES NFR BLD AUTO: 26.5 %
MANUAL DIFF? (OHS): NO
MCH RBC QN AUTO: 31.1 PG (ref 27–33)
MCHC RBC AUTO-ENTMCNC: 33.3 G/DL (ref 32–35)
MCV RBC AUTO: 93.5 FL (ref 81–97)
MONOCYTES # BLD AUTO: 0.6 10*3/UL
MONOCYTES NFR BLD AUTO: 8.4 %
NEUTROPHILS # BLD AUTO: 4.4 10*3/UL
NEUTROPHILS NFR BLD AUTO: 61.6 %
PLATELET # BLD AUTO: 227 10*3/UL (ref 140–450)
PMV BLD AUTO: 11 FL
POTASSIUM SERPL-SCNC: 3.8 MMOL/L (ref 3.5–5.1)
PROT SERPL-MCNC: 6.9 G/DL (ref 5.8–7.6)
RBC # BLD AUTO: 4.95 10*6/UL (ref 3.9–5)
SODIUM SERPL-SCNC: 142 MMOL/L (ref 136–145)
WBC # SPEC AUTO: 7.14 10*3/UL (ref 3.4–9.2)

## 2022-03-10 ENCOUNTER — HISTORICAL (OUTPATIENT)
Dept: ADMINISTRATIVE | Facility: HOSPITAL | Age: 62
End: 2022-03-10

## 2022-03-10 LAB
NEG CONT SPOT COUNT: NORMAL
PANEL A SPOT COUNT: 1
PANEL B SPOT COUNT: 0
POS CONT SPOT COUNT: NORMAL
T-SPOT.TB: NORMAL

## 2022-04-22 ENCOUNTER — HISTORICAL (OUTPATIENT)
Dept: ADMINISTRATIVE | Facility: HOSPITAL | Age: 62
End: 2022-04-22
Payer: COMMERCIAL

## 2022-05-11 ENCOUNTER — TELEPHONE (OUTPATIENT)
Dept: NEUROSURGERY | Facility: CLINIC | Age: 62
End: 2022-05-11
Payer: COMMERCIAL

## 2022-05-11 DIAGNOSIS — M47.12 CERVICAL SPONDYLOSIS WITH MYELOPATHY: Primary | ICD-10-CM

## 2022-05-31 RX ORDER — MUPIROCIN 20 MG/G
1 OINTMENT TOPICAL 2 TIMES DAILY
Status: CANCELLED | OUTPATIENT
Start: 2022-05-31 | End: 2022-06-01

## 2022-05-31 RX ORDER — MUPIROCIN 20 MG/G
OINTMENT TOPICAL
Status: CANCELLED | OUTPATIENT
Start: 2022-05-31

## 2022-06-01 ENCOUNTER — OFFICE VISIT (OUTPATIENT)
Dept: NEUROSURGERY | Facility: CLINIC | Age: 62
End: 2022-06-01
Payer: COMMERCIAL

## 2022-06-01 VITALS
DIASTOLIC BLOOD PRESSURE: 68 MMHG | HEIGHT: 66 IN | WEIGHT: 150 LBS | HEART RATE: 81 BPM | RESPIRATION RATE: 16 BRPM | BODY MASS INDEX: 24.11 KG/M2 | SYSTOLIC BLOOD PRESSURE: 120 MMHG

## 2022-06-01 DIAGNOSIS — Z72.0 TOBACCO USE: ICD-10-CM

## 2022-06-01 DIAGNOSIS — G56.22 ULNAR NEUROPATHY AT ELBOW OF LEFT UPPER EXTREMITY: ICD-10-CM

## 2022-06-01 DIAGNOSIS — G56.02 LEFT CARPAL TUNNEL SYNDROME: ICD-10-CM

## 2022-06-01 DIAGNOSIS — M81.0 AGE-RELATED OSTEOPOROSIS WITHOUT CURRENT PATHOLOGICAL FRACTURE: ICD-10-CM

## 2022-06-01 DIAGNOSIS — M47.12 CERVICAL SPONDYLOSIS WITH MYELOPATHY: Primary | ICD-10-CM

## 2022-06-01 PROCEDURE — 3078F PR MOST RECENT DIASTOLIC BLOOD PRESSURE < 80 MM HG: ICD-10-PCS | Mod: CPTII,,, | Performed by: PHYSICIAN ASSISTANT

## 2022-06-01 PROCEDURE — 3074F SYST BP LT 130 MM HG: CPT | Mod: CPTII,,, | Performed by: PHYSICIAN ASSISTANT

## 2022-06-01 PROCEDURE — 1159F PR MEDICATION LIST DOCUMENTED IN MEDICAL RECORD: ICD-10-PCS | Mod: CPTII,,, | Performed by: PHYSICIAN ASSISTANT

## 2022-06-01 PROCEDURE — 1160F PR REVIEW ALL MEDS BY PRESCRIBER/CLIN PHARMACIST DOCUMENTED: ICD-10-PCS | Mod: CPTII,,, | Performed by: PHYSICIAN ASSISTANT

## 2022-06-01 PROCEDURE — 3008F BODY MASS INDEX DOCD: CPT | Mod: CPTII,,, | Performed by: PHYSICIAN ASSISTANT

## 2022-06-01 PROCEDURE — 99215 OFFICE O/P EST HI 40 MIN: CPT | Mod: ,,, | Performed by: PHYSICIAN ASSISTANT

## 2022-06-01 PROCEDURE — 3074F PR MOST RECENT SYSTOLIC BLOOD PRESSURE < 130 MM HG: ICD-10-PCS | Mod: CPTII,,, | Performed by: PHYSICIAN ASSISTANT

## 2022-06-01 PROCEDURE — 1160F RVW MEDS BY RX/DR IN RCRD: CPT | Mod: CPTII,,, | Performed by: PHYSICIAN ASSISTANT

## 2022-06-01 PROCEDURE — 3008F PR BODY MASS INDEX (BMI) DOCUMENTED: ICD-10-PCS | Mod: CPTII,,, | Performed by: PHYSICIAN ASSISTANT

## 2022-06-01 PROCEDURE — 99215 PR OFFICE/OUTPT VISIT, EST, LEVL V, 40-54 MIN: ICD-10-PCS | Mod: ,,, | Performed by: PHYSICIAN ASSISTANT

## 2022-06-01 PROCEDURE — 1159F MED LIST DOCD IN RCRD: CPT | Mod: CPTII,,, | Performed by: PHYSICIAN ASSISTANT

## 2022-06-01 PROCEDURE — 3078F DIAST BP <80 MM HG: CPT | Mod: CPTII,,, | Performed by: PHYSICIAN ASSISTANT

## 2022-06-01 RX ORDER — LUBIPROSTONE 24 UG/1
24 CAPSULE, GELATIN COATED ORAL EVERY MORNING
COMMUNITY
Start: 2022-02-03

## 2022-06-01 RX ORDER — DICLOFENAC SODIUM 50 MG/1
TABLET, DELAYED RELEASE ORAL
Status: ON HOLD | COMMUNITY
Start: 2022-03-12 | End: 2022-08-03 | Stop reason: HOSPADM

## 2022-06-01 RX ORDER — ICOSAPENT ETHYL 1000 MG/1
1 CAPSULE ORAL 2 TIMES DAILY
COMMUNITY
Start: 2022-05-02

## 2022-06-01 RX ORDER — TRAMADOL HYDROCHLORIDE 50 MG/1
TABLET ORAL 4 TIMES DAILY PRN
COMMUNITY
Start: 2022-03-07 | End: 2022-07-13

## 2022-06-01 RX ORDER — ROSUVASTATIN CALCIUM 40 MG/1
40 TABLET, COATED ORAL NIGHTLY
COMMUNITY
Start: 2022-03-10

## 2022-06-01 RX ORDER — TRIAMTERENE/HYDROCHLOROTHIAZID 37.5-25 MG
1 TABLET ORAL EVERY MORNING
COMMUNITY
Start: 2022-03-10

## 2022-06-01 RX ORDER — GABAPENTIN 300 MG/1
CAPSULE ORAL 2 TIMES DAILY
COMMUNITY
Start: 2022-04-14 | End: 2022-09-07 | Stop reason: SDUPTHER

## 2022-06-01 RX ORDER — ACETAMINOPHEN AND PHENYLEPHRINE HCL 325; 5 MG/1; MG/1
TABLET ORAL 2 TIMES DAILY
COMMUNITY

## 2022-06-01 RX ORDER — SUCRALFATE 1 G/1
1 TABLET ORAL 3 TIMES DAILY
Status: ON HOLD | COMMUNITY
Start: 2022-03-12 | End: 2022-08-02 | Stop reason: CLARIF

## 2022-06-01 RX ORDER — METFORMIN HYDROCHLORIDE 500 MG/1
TABLET ORAL 2 TIMES DAILY
COMMUNITY
Start: 2022-05-02

## 2022-06-01 RX ORDER — IBUPROFEN 100 MG/5ML
1000 SUSPENSION, ORAL (FINAL DOSE FORM) ORAL EVERY MORNING
COMMUNITY

## 2022-06-01 NOTE — PROGRESS NOTES
[facility]  History & Physical  Neurosurgery      Dayana Mejia   [id number]   1960       CHIEF COMPLAINT:  Pain in the neck and through left arm.    HPI:  Dayana Mejia is a 61 y.o. left hand dominant female who presents for neurosurgical follow up with cervical CT and bone density.  The patient is known to Dr. Tsai for having undergone left C5-6 and C6-7 posterior foraminotomies on 06/20/2019.  She had resolution of her symptoms after that surgery.  She did well until around November of 2021. She began with tingling, numbness, and pain in the left arm that has progressively worsened.      Parent, she complains of neck pain with pain radiating into the left trapezius muscle, shoulder, upper arm, and through the dorsal forearm.  She has tingling sensations he at the tip of the 4th finger on the left.  Often, she awakened in the night with pain and numbness in the left arm.  She has difficulties with dexterity.  She has to use her right hand to do things due to decreased function in the left.  She is also dropping objects from the left hand.  Conservative measures has included home cervical traction and a home exercise program.  She has decreased range of motion in the left shoulder.  She has been working on her range of motion at home.  Her symptoms have continued to worsen.        Past Medical History:   Diagnosis Date    Dyslipidemia     History of DVT (deep vein thrombosis)     History of renal cell carcinoma     HTN (hypertension)     IBS (irritable bowel syndrome)     Mild intermittent asthma, uncomplicated     OA (osteoarthritis)     Type 2 diabetes mellitus without complications        Past Surgical History:   Procedure Laterality Date    BREAST MASS EXCISION Left 2012    CHOLECYSTECTOMY  1987    open    HERNIA REPAIR  1987    LAPAROSCOPIC NEPHRECTOMY Left 05/05/2017    Dr. Chucho Au    Left C5-6, C6-7 foraminotomies  06/20/2019    Dr. Tsai       Family History   Problem  Relation Age of Onset    Coronary artery disease Mother     Hypertension Mother     Stroke Mother     Hyperlipidemia Mother     Cancer Father     Coronary artery disease Father        Social History     Socioeconomic History    Marital status:    Tobacco Use    Smoking status: Current Every Day Smoker     Packs/day: 1.00     Years: 45.00     Pack years: 45.00     Types: Cigarettes    Smokeless tobacco: Never Used   Substance and Sexual Activity    Alcohol use: Yes     Alcohol/week: 6.0 standard drinks     Types: 3 Glasses of wine, 3 Cans of beer per week       Current Outpatient Medications   Medication Sig Dispense Refill    AMITIZA 24 mcg Cap once daily at 6am.      ascorbic acid, vitamin C, (VITAMIN C) 1000 MG tablet Take 1,000 mg by mouth once daily.      biotin 10,000 mcg Cap Take by mouth once daily at 6am.      diclofenac (VOLTAREN) 50 MG EC tablet as needed.      gabapentin (NEURONTIN) 300 MG capsule 2 (two) times daily.      metFORMIN (GLUCOPHAGE) 500 MG tablet 2 (two) times a day.      rosuvastatin (CRESTOR) 40 MG Tab once daily at 6am.      traMADoL (ULTRAM) 50 mg tablet 4 (four) times daily as needed.      triamterene-hydrochlorothiazide 37.5-25 mg (MAXZIDE-25) 37.5-25 mg per tablet once daily at 6am.      VASCEPA 1 gram Cap 2 (two) times a day.      sucralfate (CARAFATE) 1 gram tablet 3 (three) times daily.         Review of patient's allergies indicates:   Allergen Reactions    Codeine Nausea And Vomiting     Other reaction(s): Vomiting (disorder)            ROS:    Review of Systems   Constitutional: Negative for chills and fever.   HENT: Negative for nosebleeds and sore throat.    Eyes: Negative for pain and visual disturbance.   Respiratory: Negative for cough, chest tightness and shortness of breath.    Cardiovascular: Negative for chest pain.   Gastrointestinal: Negative for diarrhea, nausea and vomiting.   Genitourinary: Negative for difficulty urinating, dysuria and  "hematuria.   Musculoskeletal: Positive for neck pain. Negative for gait problem and myalgias.   Skin: Negative for rash.   Neurological: Positive for weakness and numbness. Negative for dizziness, facial asymmetry and headaches.   Psychiatric/Behavioral: Negative for confusion and sleep disturbance. The patient is not nervous/anxious.        PE:    /68 (BP Location: Other (Comment), Patient Position: Sitting)   Pulse 81   Resp 16   Ht 5' 6" (1.676 m)   Wt 68 kg (150 lb)   BMI 24.21 kg/m²       General:  Pleasant. Well-nourished. Well-groomed.    Lungs:  Breathing is quiet, non-labored     Musculoskeletal:  Cervical ROM: Mildly limited with flexion, severely limited with extension, and moderately limited with bilateral rotation.  She has pain with motion in all four spheres.  Spurling's: Negative bilaterally for Spurling's.  Tinel's is positive on the left, negative on the right at both the wrist and elbow.  Left shoulder active flexion is 90 degrees.      Neurological:    Oriented to Person, Place, Time   Muscle strength against resistance:  Strength  Deltoids Triceps Biceps Wrist Extension Wrist Flexion Hand    Upper: R 5-/5 5/5 5/5 5/5 5/5 5/5    L 5-/5 5/5 5/5 5-/5 5/5 5-/5   Sensation is intact in bilateral lower extremities to a pinprick.  Decreased sensation:   Left pinprick: C6 and C7  Tai's is positive bilaterally.  There is no clonus at the ankles.  Reflexes:   Left Right   Triceps 2+ 2+   Biceps 3+ 3+   Brachioradialis 3+ 3+   Patellar 2+ 2+   Achilles 2+ 2+   Tai's is positive bilaterally.    There is no clonus at the ankles.  Gait is normal.      MRI of the cervical spine was obtained on 3/10/2022.  At C5-6, there is disk/osteophyte complex with facet and uncinate hypertrophy that causes moderate central, severe left, and moderate right foraminal narrowing.  At C6-7, there is disk/osteophyte complex with facet and uncinate hypertrophy that causes severe central and bilateral " foraminal narrowing. On the left at C4-5 and C7-T1, there is uncinate and facet hypertrophy that causes moderate foraminal narrowing. Cervical x-rays from the same date show severe disk degeneration and spondylosis at C5-6 and C6-7. There is no abnormal motion with flexion or extension.  Cervical CT without contrast was obtained on 4/22/2022.  There is disk degeneration and spondylosis at C5-6 and C6-7 greater than C4-5.  There is right sided facet and uncinate hypertrophy that causes moderate C5-6 and mild C6-7 foraminal narrowing.    Bone density obtained on 4/22/2022 shows she has osteoporosis.    ASSESSMENT/PLAN:     1. Cervical spondylosis with myelopathy  Ambulatory referral/consult to Physical/Occupational Therapy    Ambulatory referral/consult to Neurology    EMG W/ ULTRASOUND AND NERVE CONDUCTION TEST 1 Extremity   2. Left carpal tunnel syndrome  Ambulatory referral/consult to Physical/Occupational Therapy    Ambulatory referral/consult to Neurology    EMG W/ ULTRASOUND AND NERVE CONDUCTION TEST 1 Extremity   3. Ulnar neuropathy at elbow of left upper extremity  Ambulatory referral/consult to Physical/Occupational Therapy    Ambulatory referral/consult to Neurology    EMG W/ ULTRASOUND AND NERVE CONDUCTION TEST 1 Extremity   4. Age-related osteoporosis without current pathological fracture  PTH, Intact    Uric Acid    Basic Metabolic Panel    Vitamin D   5. Tobacco use  Ambulatory referral/consult to Smoking Cessation Program         The patient was seen and examined by Dr. Amado.  Options were discussed at length with the patient and her .  We had planned tentatively for C5-6 and C6-7 ACDF.  Her insurance has denied coverage for the procedure because she has not undergone a course of physical therapy.  She will do so at this time.  We will also obtain electrical studies of the left upper extremity.  In addition, she has osteoporosis.  We will obtain the lab work to see if she is a candidate for  Prolia or Forteo.  We will forward that information to Dr. Vinayak Partida.  She will return for follow-up after therapy.      A total of 43 minutes was spent face-to-face with the patient during this encounter.  Over half of that time was spent on counseling and coordination of care.  An additional 20 minutes were used to review the patient's chart, cervical MRI, x-rays, CT, and bone density, discuss with Dr. Tsai, and work on office note.

## 2022-06-16 DIAGNOSIS — G56.22 ULNAR NEUROPATHY AT ELBOW OF LEFT UPPER EXTREMITY: ICD-10-CM

## 2022-06-16 DIAGNOSIS — M47.12 CERVICAL SPONDYLOSIS WITH MYELOPATHY: Primary | ICD-10-CM

## 2022-06-16 DIAGNOSIS — G56.02 LEFT CARPAL TUNNEL SYNDROME: ICD-10-CM

## 2022-07-13 ENCOUNTER — OFFICE VISIT (OUTPATIENT)
Dept: NEUROSURGERY | Facility: CLINIC | Age: 62
End: 2022-07-13
Payer: COMMERCIAL

## 2022-07-13 VITALS
BODY MASS INDEX: 24.14 KG/M2 | HEIGHT: 66 IN | HEART RATE: 92 BPM | SYSTOLIC BLOOD PRESSURE: 123 MMHG | WEIGHT: 150.19 LBS | RESPIRATION RATE: 18 BRPM | DIASTOLIC BLOOD PRESSURE: 68 MMHG

## 2022-07-13 DIAGNOSIS — M81.0 AGE-RELATED OSTEOPOROSIS WITHOUT CURRENT PATHOLOGICAL FRACTURE: ICD-10-CM

## 2022-07-13 DIAGNOSIS — M81.0 AGE-RELATED OSTEOPOROSIS WITHOUT CURRENT PATHOLOGICAL FRACTURE: Primary | ICD-10-CM

## 2022-07-13 DIAGNOSIS — M47.12 CERVICAL SPONDYLOSIS WITH MYELOPATHY: Primary | ICD-10-CM

## 2022-07-13 PROCEDURE — 1159F MED LIST DOCD IN RCRD: CPT | Mod: CPTII,,, | Performed by: PHYSICIAN ASSISTANT

## 2022-07-13 PROCEDURE — 3008F BODY MASS INDEX DOCD: CPT | Mod: CPTII,,, | Performed by: PHYSICIAN ASSISTANT

## 2022-07-13 PROCEDURE — 3078F DIAST BP <80 MM HG: CPT | Mod: CPTII,,, | Performed by: PHYSICIAN ASSISTANT

## 2022-07-13 PROCEDURE — 99213 OFFICE O/P EST LOW 20 MIN: CPT | Mod: ,,, | Performed by: PHYSICIAN ASSISTANT

## 2022-07-13 PROCEDURE — 3008F PR BODY MASS INDEX (BMI) DOCUMENTED: ICD-10-PCS | Mod: CPTII,,, | Performed by: PHYSICIAN ASSISTANT

## 2022-07-13 PROCEDURE — 1160F PR REVIEW ALL MEDS BY PRESCRIBER/CLIN PHARMACIST DOCUMENTED: ICD-10-PCS | Mod: CPTII,,, | Performed by: PHYSICIAN ASSISTANT

## 2022-07-13 PROCEDURE — 1160F RVW MEDS BY RX/DR IN RCRD: CPT | Mod: CPTII,,, | Performed by: PHYSICIAN ASSISTANT

## 2022-07-13 PROCEDURE — 3078F PR MOST RECENT DIASTOLIC BLOOD PRESSURE < 80 MM HG: ICD-10-PCS | Mod: CPTII,,, | Performed by: PHYSICIAN ASSISTANT

## 2022-07-13 PROCEDURE — 1159F PR MEDICATION LIST DOCUMENTED IN MEDICAL RECORD: ICD-10-PCS | Mod: CPTII,,, | Performed by: PHYSICIAN ASSISTANT

## 2022-07-13 PROCEDURE — 99213 PR OFFICE/OUTPT VISIT, EST, LEVL III, 20-29 MIN: ICD-10-PCS | Mod: ,,, | Performed by: PHYSICIAN ASSISTANT

## 2022-07-13 PROCEDURE — 3074F PR MOST RECENT SYSTOLIC BLOOD PRESSURE < 130 MM HG: ICD-10-PCS | Mod: CPTII,,, | Performed by: PHYSICIAN ASSISTANT

## 2022-07-13 PROCEDURE — 3074F SYST BP LT 130 MM HG: CPT | Mod: CPTII,,, | Performed by: PHYSICIAN ASSISTANT

## 2022-07-13 RX ORDER — BUDESONIDE AND FORMOTEROL FUMARATE DIHYDRATE 160; 4.5 UG/1; UG/1
1 AEROSOL RESPIRATORY (INHALATION) EVERY 12 HOURS
COMMUNITY
Start: 2022-06-03

## 2022-07-13 RX ORDER — UPADACITINIB 15 MG/1
15 TABLET, EXTENDED RELEASE ORAL DAILY
Status: ON HOLD | COMMUNITY
Start: 2022-06-28 | End: 2022-08-03 | Stop reason: HOSPADM

## 2022-07-13 RX ORDER — RALOXIFENE HYDROCHLORIDE 60 MG/1
60 TABLET, FILM COATED ORAL EVERY MORNING
COMMUNITY
Start: 2022-06-02 | End: 2023-08-30

## 2022-07-13 RX ORDER — TEMAZEPAM 15 MG/1
15 CAPSULE ORAL NIGHTLY PRN
COMMUNITY
Start: 2022-06-27

## 2022-07-13 NOTE — H&P (VIEW-ONLY)
[facility]  History & Physical  Neurosurgery      Dayana Mejia   [id number]   1960       CHIEF COMPLAINT:  Neck pain    HPI:  Dayana Mejai is a 61 y.o. female who presents for follow up after a course of physical thearpy.     he patient is known to Dr. Tsai for having undergone left C5-6 and C6-7 posterior foraminotomies on 06/20/2019.  She had resolution of her symptoms after that surgery.  She did well until around November of 2021. She began with tingling, numbness, and pain in the left arm that has progressively worsened.       Parent, she complains of neck pain with pain radiating into the left trapezius muscle, shoulder, upper arm, and through the dorsal forearm.  She rates her pain at 7/10.  There is tingling in the tips of all fingers.  Often, she awakened in the night with pain and numbness in the left arm.  She has difficulties with dexterity.  She has to use her right hand to do things due to decreased function in the left.  She is also dropping objects from the left hand.  Subjectively, she feels weak in the left arm and hand.  Conservative measures has included home cervical traction and a home exercise program.  In addition, she underwent a course of physical therapy.  There was no improvement in her symptoms.  Her neck pain has continued to worse.  Her arm symptoms are at a plateau.    She has decreased range of motion in the left shoulder.  She has been working on her range of motion at therapy and home.  She acknowledges she resist motion when others are stretching her due to pain.  At times, she feels her balance is a little off.  She denies disturbances in bowel or bladder function.       Past Medical History:   Diagnosis Date    Dyslipidemia     History of DVT (deep vein thrombosis)     History of renal cell carcinoma     HTN (hypertension)     IBS (irritable bowel syndrome)     Mild intermittent asthma, uncomplicated     OA (osteoarthritis)     Type 2 diabetes mellitus  without complications        Past Surgical History:   Procedure Laterality Date    BREAST MASS EXCISION Left 2012    CHOLECYSTECTOMY  1987    open    HERNIA REPAIR  1987    LAPAROSCOPIC NEPHRECTOMY Left 05/05/2017    Dr. Chucho Au    Left C5-6, C6-7 foraminotomies  06/20/2019    Dr. Tsai       Family History   Problem Relation Age of Onset    Coronary artery disease Mother     Hypertension Mother     Stroke Mother     Hyperlipidemia Mother     Cancer Father     Coronary artery disease Father        Social History     Socioeconomic History    Marital status:    Tobacco Use    Smoking status: Current Every Day Smoker     Packs/day: 1.00     Years: 45.00     Pack years: 45.00     Types: Cigarettes    Smokeless tobacco: Never Used   Substance and Sexual Activity    Alcohol use: Yes     Alcohol/week: 6.0 standard drinks     Types: 3 Glasses of wine, 3 Cans of beer per week       Current Outpatient Medications   Medication Sig Dispense Refill    AMITIZA 24 mcg Cap once daily at 6am.      ascorbic acid, vitamin C, (VITAMIN C) 1000 MG tablet Take 1,000 mg by mouth once daily.      biotin 10,000 mcg Cap Take by mouth once daily at 6am.      diclofenac (VOLTAREN) 50 MG EC tablet as needed.      gabapentin (NEURONTIN) 300 MG capsule 2 (two) times daily.      metFORMIN (GLUCOPHAGE) 500 MG tablet 2 (two) times a day.      raloxifene (EVISTA) 60 mg tablet Take 60 mg by mouth once daily.      RINVOQ 15 mg 24 hr tablet Take 15 mg by mouth once daily.      rosuvastatin (CRESTOR) 40 MG Tab once daily at 6am.      sucralfate (CARAFATE) 1 gram tablet 3 (three) times daily.      SYMBICORT 160-4.5 mcg/actuation HFAA       temazepam (RESTORIL) 15 mg Cap Take 15 mg by mouth nightly.      triamterene-hydrochlorothiazide 37.5-25 mg (MAXZIDE-25) 37.5-25 mg per tablet once daily at 6am.      VASCEPA 1 gram Cap 2 (two) times a day.       No current facility-administered medications for this visit.  "      Review of patient's allergies indicates:   Allergen Reactions    Codeine Nausea And Vomiting     Other reaction(s): Vomiting (disorder)          Review of Systems   Constitutional: Negative for chills and fever.   HENT: Negative for nosebleeds and sore throat.    Eyes: Negative for pain and visual disturbance.   Respiratory: Negative for cough, chest tightness and shortness of breath.    Cardiovascular: Negative for chest pain.   Gastrointestinal: Negative for diarrhea, nausea and vomiting.   Genitourinary: Negative for difficulty urinating, dysuria and hematuria.   Musculoskeletal: Negative for gait problem and myalgias.   Skin: Negative for rash.   Neurological: Negative for dizziness, facial asymmetry and headaches.   Psychiatric/Behavioral: Negative for confusion and sleep disturbance. The patient is not nervous/anxious.        Physical Examination:    /68 (BP Location: Other (Comment), Patient Position: Sitting)   Pulse 92   Resp 18   Ht 5' 6" (1.676 m)   Wt 68.1 kg (150 lb 3.2 oz)   BMI 24.24 kg/m²     General:  Pleasant. Well-nourished. Well-groomed.    CV:  Heart has regular rate and rhythm.  There are no carotid bruits.    Lungs:  Clear to ausculation bilaterally.  Breathing is quiet, non-labored     Abdomen:  Soft, non-tender, non-distended.    Musculoskeletal:  Cervical ROM: Moderately limited with extension and bilateral rotation.  Pain is increased in each direction.  Tinel's is negative at bilateral wrist and elbows.  Spurling's maneuver is negative bilaterally.    Neurological:    Oriented to Person, Place, Time   Muscle strength against resistance:  Strength  Deltoids Triceps Biceps Wrist Extension Wrist Flexion Hand    Upper: R 5/5 5/5 5/5 5/5 5/5 5/5    L 5/5 4+/5 5-/5 5/5 5/5 5-/5   Sensation is intact to primary modalities in bilateral upper extremities.  Reflexes:   Right Left   Triceps 2+ 2+   Biceps 3+ 3+   Brachioradialis 3+ 3+   Patellar 2+ 2+   Achilles 2+ 2+ "   Tai's sign is positive bilaterally.  Gait: normal  Coordination is normal    Imaging:  MRI of the cervical spine was obtained on 3/10/2022.  At C5-6, there is disk/osteophyte complex with facet and uncinate hypertrophy that causes moderate central, severe left, and moderate right foraminal narrowing.  At C6-7, there is disk/osteophyte complex with facet and uncinate hypertrophy that causes severe central and bilateral foraminal narrowing. On the left at C4-5 and C7-T1, there is uncinate and facet hypertrophy that causes moderate foraminal narrowing. Cervical x-rays from the same date show severe disk degeneration and spondylosis at C5-6 and C6-7. There is no abnormal motion with flexion or extension.  Cervical CT without contrast was obtained on 4/22/2022.  There is disk degeneration and spondylosis at C5-6 and C6-7 greater than C4-5.  There is right sided facet and uncinate hypertrophy that causes moderate C5-6 and mild C6-7 foraminal narrowing.    Bone density obtained on 4/22/2022 shows she has osteoporosis.  EMG and NCS from 07/07/2022 reveal left C6 and C7 radiculopathy without denervation    ASSESSMENT/PLAN:     1. Cervical spondylosis with myelopathy     2. Age-related osteoporosis without current pathological fracture         Options were once again discussed with the patient.  She has failed conservative measures.  She has undergone a course of physical therapy without improvement in her symptoms.  Risk, benefits, possible complications were discussed with the patient.  We will plan once again for C5-6 and C6-7 ACDF this is tentatively scheduled for 08/25/2022.      7/15/2022  The patient and her situation was discussed with Dr. Tsai yesterday.  He reviewed her cervical MRI, CT, and x-ray as well as electrical studies.  Plan remains C5-6 and C6-7 ACDF.  I contacted the patient to inform her.  Lab work was reviewed with the patient.

## 2022-07-13 NOTE — PROGRESS NOTES
[facility]  History & Physical  Neurosurgery      Dayana Mejia   [id number]   1960       CHIEF COMPLAINT:  Neck pain    HPI:  Dayana Mejia is a 61 y.o. female who presents for follow up after a course of physical thearpy.     he patient is known to Dr. Tsai for having undergone left C5-6 and C6-7 posterior foraminotomies on 06/20/2019.  She had resolution of her symptoms after that surgery.  She did well until around November of 2021. She began with tingling, numbness, and pain in the left arm that has progressively worsened.       Parent, she complains of neck pain with pain radiating into the left trapezius muscle, shoulder, upper arm, and through the dorsal forearm.  She rates her pain at 7/10.  There is tingling in the tips of all fingers.  Often, she awakened in the night with pain and numbness in the left arm.  She has difficulties with dexterity.  She has to use her right hand to do things due to decreased function in the left.  She is also dropping objects from the left hand.  Subjectively, she feels weak in the left arm and hand.  Conservative measures has included home cervical traction and a home exercise program.  In addition, she underwent a course of physical therapy.  There was no improvement in her symptoms.  Her neck pain has continued to worse.  Her arm symptoms are at a plateau.    She has decreased range of motion in the left shoulder.  She has been working on her range of motion at therapy and home.  She acknowledges she resist motion when others are stretching her due to pain.  At times, she feels her balance is a little off.  She denies disturbances in bowel or bladder function.       Past Medical History:   Diagnosis Date    Dyslipidemia     History of DVT (deep vein thrombosis)     History of renal cell carcinoma     HTN (hypertension)     IBS (irritable bowel syndrome)     Mild intermittent asthma, uncomplicated     OA (osteoarthritis)     Type 2 diabetes mellitus  without complications        Past Surgical History:   Procedure Laterality Date    BREAST MASS EXCISION Left 2012    CHOLECYSTECTOMY  1987    open    HERNIA REPAIR  1987    LAPAROSCOPIC NEPHRECTOMY Left 05/05/2017    Dr. Chucho Au    Left C5-6, C6-7 foraminotomies  06/20/2019    Dr. Tsai       Family History   Problem Relation Age of Onset    Coronary artery disease Mother     Hypertension Mother     Stroke Mother     Hyperlipidemia Mother     Cancer Father     Coronary artery disease Father        Social History     Socioeconomic History    Marital status:    Tobacco Use    Smoking status: Current Every Day Smoker     Packs/day: 1.00     Years: 45.00     Pack years: 45.00     Types: Cigarettes    Smokeless tobacco: Never Used   Substance and Sexual Activity    Alcohol use: Yes     Alcohol/week: 6.0 standard drinks     Types: 3 Glasses of wine, 3 Cans of beer per week       Current Outpatient Medications   Medication Sig Dispense Refill    AMITIZA 24 mcg Cap once daily at 6am.      ascorbic acid, vitamin C, (VITAMIN C) 1000 MG tablet Take 1,000 mg by mouth once daily.      biotin 10,000 mcg Cap Take by mouth once daily at 6am.      diclofenac (VOLTAREN) 50 MG EC tablet as needed.      gabapentin (NEURONTIN) 300 MG capsule 2 (two) times daily.      metFORMIN (GLUCOPHAGE) 500 MG tablet 2 (two) times a day.      raloxifene (EVISTA) 60 mg tablet Take 60 mg by mouth once daily.      RINVOQ 15 mg 24 hr tablet Take 15 mg by mouth once daily.      rosuvastatin (CRESTOR) 40 MG Tab once daily at 6am.      sucralfate (CARAFATE) 1 gram tablet 3 (three) times daily.      SYMBICORT 160-4.5 mcg/actuation HFAA       temazepam (RESTORIL) 15 mg Cap Take 15 mg by mouth nightly.      triamterene-hydrochlorothiazide 37.5-25 mg (MAXZIDE-25) 37.5-25 mg per tablet once daily at 6am.      VASCEPA 1 gram Cap 2 (two) times a day.       No current facility-administered medications for this visit.  "      Review of patient's allergies indicates:   Allergen Reactions    Codeine Nausea And Vomiting     Other reaction(s): Vomiting (disorder)          Review of Systems   Constitutional: Negative for chills and fever.   HENT: Negative for nosebleeds and sore throat.    Eyes: Negative for pain and visual disturbance.   Respiratory: Negative for cough, chest tightness and shortness of breath.    Cardiovascular: Negative for chest pain.   Gastrointestinal: Negative for diarrhea, nausea and vomiting.   Genitourinary: Negative for difficulty urinating, dysuria and hematuria.   Musculoskeletal: Negative for gait problem and myalgias.   Skin: Negative for rash.   Neurological: Negative for dizziness, facial asymmetry and headaches.   Psychiatric/Behavioral: Negative for confusion and sleep disturbance. The patient is not nervous/anxious.        Physical Examination:    /68 (BP Location: Other (Comment), Patient Position: Sitting)   Pulse 92   Resp 18   Ht 5' 6" (1.676 m)   Wt 68.1 kg (150 lb 3.2 oz)   BMI 24.24 kg/m²     General:  Pleasant. Well-nourished. Well-groomed.    CV:  Heart has regular rate and rhythm.  There are no carotid bruits.    Lungs:  Clear to ausculation bilaterally.  Breathing is quiet, non-labored     Abdomen:  Soft, non-tender, non-distended.    Musculoskeletal:  Cervical ROM: Moderately limited with extension and bilateral rotation.  Pain is increased in each direction.  Tinel's is negative at bilateral wrist and elbows.  Spurling's maneuver is negative bilaterally.    Neurological:    Oriented to Person, Place, Time   Muscle strength against resistance:  Strength  Deltoids Triceps Biceps Wrist Extension Wrist Flexion Hand    Upper: R 5/5 5/5 5/5 5/5 5/5 5/5    L 5/5 4+/5 5-/5 5/5 5/5 5-/5   Sensation is intact to primary modalities in bilateral upper extremities.  Reflexes:   Right Left   Triceps 2+ 2+   Biceps 3+ 3+   Brachioradialis 3+ 3+   Patellar 2+ 2+   Achilles 2+ 2+ "   Tai's sign is positive bilaterally.  Gait: normal  Coordination is normal    Imaging:  MRI of the cervical spine was obtained on 3/10/2022.  At C5-6, there is disk/osteophyte complex with facet and uncinate hypertrophy that causes moderate central, severe left, and moderate right foraminal narrowing.  At C6-7, there is disk/osteophyte complex with facet and uncinate hypertrophy that causes severe central and bilateral foraminal narrowing. On the left at C4-5 and C7-T1, there is uncinate and facet hypertrophy that causes moderate foraminal narrowing. Cervical x-rays from the same date show severe disk degeneration and spondylosis at C5-6 and C6-7. There is no abnormal motion with flexion or extension.  Cervical CT without contrast was obtained on 4/22/2022.  There is disk degeneration and spondylosis at C5-6 and C6-7 greater than C4-5.  There is right sided facet and uncinate hypertrophy that causes moderate C5-6 and mild C6-7 foraminal narrowing.    Bone density obtained on 4/22/2022 shows she has osteoporosis.  EMG and NCS from 07/07/2022 reveal left C6 and C7 radiculopathy without denervation    ASSESSMENT/PLAN:     1. Cervical spondylosis with myelopathy     2. Age-related osteoporosis without current pathological fracture         Options were once again discussed with the patient.  She has failed conservative measures.  She has undergone a course of physical therapy without improvement in her symptoms.  Risk, benefits, possible complications were discussed with the patient.  We will plan once again for C5-6 and C6-7 ACDF this is tentatively scheduled for 08/25/2022.      7/15/2022  The patient and her situation was discussed with Dr. Tsai yesterday.  He reviewed her cervical MRI, CT, and x-ray as well as electrical studies.  Plan remains C5-6 and C6-7 ACDF.  I contacted the patient to inform her.  Lab work was reviewed with the patient.

## 2022-07-14 ENCOUNTER — LAB VISIT (OUTPATIENT)
Dept: LAB | Facility: HOSPITAL | Age: 62
End: 2022-07-14
Attending: PHYSICIAN ASSISTANT
Payer: COMMERCIAL

## 2022-07-14 DIAGNOSIS — M81.0 AGE-RELATED OSTEOPOROSIS WITHOUT CURRENT PATHOLOGICAL FRACTURE: ICD-10-CM

## 2022-07-14 LAB
ANION GAP SERPL CALC-SCNC: 10 MEQ/L
BUN SERPL-MCNC: 14 MG/DL (ref 9.8–20.1)
CALCIUM SERPL-MCNC: 9.9 MG/DL (ref 8.4–10.2)
CHLORIDE SERPL-SCNC: 100 MMOL/L (ref 98–107)
CO2 SERPL-SCNC: 30 MMOL/L (ref 23–31)
CREAT SERPL-MCNC: 0.78 MG/DL (ref 0.55–1.02)
CREAT/UREA NIT SERPL: 18
DEPRECATED CALCIDIOL+CALCIFEROL SERPL-MC: 61.2 NG/ML (ref 30–80)
GLUCOSE SERPL-MCNC: 92 MG/DL (ref 82–115)
POTASSIUM SERPL-SCNC: 4.4 MMOL/L (ref 3.5–5.1)
PTH-INTACT SERPL-MCNC: 50.7 PG/ML (ref 8.7–77)
SODIUM SERPL-SCNC: 140 MMOL/L (ref 136–145)
URATE SERPL-MCNC: 5.5 MG/DL (ref 2.6–6)

## 2022-07-14 PROCEDURE — 80048 BASIC METABOLIC PNL TOTAL CA: CPT

## 2022-07-14 PROCEDURE — 82306 VITAMIN D 25 HYDROXY: CPT

## 2022-07-14 PROCEDURE — 84550 ASSAY OF BLOOD/URIC ACID: CPT

## 2022-07-14 PROCEDURE — 83970 ASSAY OF PARATHORMONE: CPT

## 2022-07-14 PROCEDURE — 36415 COLL VENOUS BLD VENIPUNCTURE: CPT

## 2022-07-20 ENCOUNTER — DOCUMENTATION ONLY (OUTPATIENT)
Dept: NEUROSURGERY | Facility: CLINIC | Age: 62
End: 2022-07-20
Payer: COMMERCIAL

## 2022-07-20 NOTE — PROGRESS NOTES
Fannie Gutierrez, [facility]  Jaclyn Langley  Cc: Nery Arnett LPN  Peer to peer performed. AUTH #: [id number]; valid 8/25/2022-8/28/2022.     Only received CT cervical spine report and notes from 2019. Did not receive MRI/XR cervical spine or any other office notes. -Fannie             Previous Messages       ----- Message -----   From: Nery Arnett LPN   Sent: 7/20/2022   8:41 AM CDT   To: Fannie Freeman, [facility]   Subject: FW: case denied                                   Fannie, I spoke with Della DUGAN. Peer to peer scheduled today, 7/20/22, @ 3 pm for you with Dr. Mario. He will be calling.     ----- Message -----   From: Jaclyn Langley   Sent: 7/19/2022   3:07 PM CDT   To: [facility] Staff   Subject: case denied                                       Good afternoon,     Case denied due to not meeting all required criteria see details below       This surgery should be done when all required criteria are met. It should be done when you have pressure on a nerve root in your neck. A physical exam by your doctor might show signs of nerve root damage in your neck. These signs could include abnormal reflexes, muscle weakness in your arm or loss of feeling. You may also have pain that is not improving with treatment. You should have been treated by your doctor for at least six weeks. You should have received two types of treatment. Treatment must include special exercises that help make muscles stronger. This is called physical therapy. You also need to have special pictures of your neck. These pictures could be an MRI or CT scan. The pictures should show signs of pressure on the nerve root in your neck that match your exam findings. We reviewed the notes we received. The notes do not show that you meet all the required criteria. For this reason, this surgery is not medically necessary.       Peer to peer can be completed by calling [phone]     Case # [id number]          Please advise     Thank you,     Jaclyn solis   [phone]

## 2022-07-29 DIAGNOSIS — M47.12 CERVICAL SPONDYLOSIS WITH MYELOPATHY: Primary | ICD-10-CM

## 2022-07-29 RX ORDER — MUPIROCIN 20 MG/G
1 OINTMENT TOPICAL 2 TIMES DAILY
Status: CANCELLED | OUTPATIENT
Start: 2022-07-29 | End: 2022-07-30

## 2022-07-29 RX ORDER — CETIRIZINE HYDROCHLORIDE 10 MG/1
10 TABLET ORAL EVERY MORNING
COMMUNITY

## 2022-08-01 ENCOUNTER — HOSPITAL ENCOUNTER (OUTPATIENT)
Dept: RADIOLOGY | Facility: HOSPITAL | Age: 62
Discharge: HOME | DRG: 472 | End: 2022-08-01
Attending: NURSE PRACTITIONER
Payer: COMMERCIAL

## 2022-08-01 ENCOUNTER — ANESTHESIA EVENT (OUTPATIENT)
Dept: SURGERY | Facility: HOSPITAL | Age: 62
DRG: 472 | End: 2022-08-01
Payer: COMMERCIAL

## 2022-08-01 ENCOUNTER — OFFICE VISIT (OUTPATIENT)
Dept: NEUROSURGERY | Facility: CLINIC | Age: 62
End: 2022-08-01
Payer: COMMERCIAL

## 2022-08-01 ENCOUNTER — TELEPHONE (OUTPATIENT)
Dept: NEUROSURGERY | Facility: CLINIC | Age: 62
End: 2022-08-01

## 2022-08-01 VITALS
SYSTOLIC BLOOD PRESSURE: 132 MMHG | HEIGHT: 65 IN | DIASTOLIC BLOOD PRESSURE: 70 MMHG | HEART RATE: 89 BPM | RESPIRATION RATE: 16 BRPM | WEIGHT: 148 LBS | BODY MASS INDEX: 24.66 KG/M2

## 2022-08-01 DIAGNOSIS — E11.9 TYPE 2 DIABETES MELLITUS WITHOUT COMPLICATION, UNSPECIFIED WHETHER LONG TERM INSULIN USE: ICD-10-CM

## 2022-08-01 DIAGNOSIS — K21.9 GASTROESOPHAGEAL REFLUX DISEASE, UNSPECIFIED WHETHER ESOPHAGITIS PRESENT: ICD-10-CM

## 2022-08-01 DIAGNOSIS — J45.909 ASTHMA, UNSPECIFIED ASTHMA SEVERITY, UNSPECIFIED WHETHER COMPLICATED, UNSPECIFIED WHETHER PERSISTENT: ICD-10-CM

## 2022-08-01 DIAGNOSIS — M47.12 CERVICAL SPONDYLOSIS WITH MYELOPATHY: Primary | ICD-10-CM

## 2022-08-01 PROCEDURE — 71046 X-RAY EXAM CHEST 2 VIEWS: CPT | Mod: TC

## 2022-08-01 PROCEDURE — 99213 OFFICE O/P EST LOW 20 MIN: CPT | Mod: ,,, | Performed by: NURSE PRACTITIONER

## 2022-08-01 PROCEDURE — 3008F PR BODY MASS INDEX (BMI) DOCUMENTED: ICD-10-PCS | Mod: CPTII,,, | Performed by: NURSE PRACTITIONER

## 2022-08-01 PROCEDURE — 3078F DIAST BP <80 MM HG: CPT | Mod: CPTII,,, | Performed by: NURSE PRACTITIONER

## 2022-08-01 PROCEDURE — 3078F PR MOST RECENT DIASTOLIC BLOOD PRESSURE < 80 MM HG: ICD-10-PCS | Mod: CPTII,,, | Performed by: NURSE PRACTITIONER

## 2022-08-01 PROCEDURE — 3075F SYST BP GE 130 - 139MM HG: CPT | Mod: CPTII,,, | Performed by: NURSE PRACTITIONER

## 2022-08-01 PROCEDURE — 99213 PR OFFICE/OUTPT VISIT, EST, LEVL III, 20-29 MIN: ICD-10-PCS | Mod: ,,, | Performed by: NURSE PRACTITIONER

## 2022-08-01 PROCEDURE — 3075F PR MOST RECENT SYSTOLIC BLOOD PRESS GE 130-139MM HG: ICD-10-PCS | Mod: CPTII,,, | Performed by: NURSE PRACTITIONER

## 2022-08-01 PROCEDURE — 3008F BODY MASS INDEX DOCD: CPT | Mod: CPTII,,, | Performed by: NURSE PRACTITIONER

## 2022-08-01 NOTE — PATIENT INSTRUCTIONS
-Take gabapentin tomorrow morning with a sip of water  -Nothing to eat or drink after midnight  
Note Text (......Xxx Chief Complaint.): This diagnosis correlates with the
Detail Level: Zone
Other (Free Text): LN2 patient request. Warned of scarring.

## 2022-08-02 ENCOUNTER — ANESTHESIA (OUTPATIENT)
Dept: SURGERY | Facility: HOSPITAL | Age: 62
DRG: 472 | End: 2022-08-02
Payer: COMMERCIAL

## 2022-08-02 ENCOUNTER — HOSPITAL ENCOUNTER (INPATIENT)
Facility: HOSPITAL | Age: 62
LOS: 1 days | Discharge: HOME | DRG: 472 | End: 2022-08-03
Attending: NEUROLOGICAL SURGERY | Admitting: NEUROLOGICAL SURGERY
Payer: COMMERCIAL

## 2022-08-02 DIAGNOSIS — Z01.818 PRE-OP TESTING: ICD-10-CM

## 2022-08-02 DIAGNOSIS — M47.12 CERVICAL SPONDYLOSIS WITH MYELOPATHY: ICD-10-CM

## 2022-08-02 LAB — POCT GLUCOSE: 111 MG/DL (ref 70–110)

## 2022-08-02 PROCEDURE — C1729 CATH, DRAINAGE: HCPCS | Performed by: NEUROLOGICAL SURGERY

## 2022-08-02 PROCEDURE — 22552 ARTHRD ANT NTRBD CERVICAL EA: CPT | Mod: ,,, | Performed by: NEUROLOGICAL SURGERY

## 2022-08-02 PROCEDURE — 25000003 PHARM REV CODE 250: Performed by: NURSE PRACTITIONER

## 2022-08-02 PROCEDURE — 22853 INSJ BIOMECHANICAL DEVICE: CPT | Mod: ,,, | Performed by: NEUROLOGICAL SURGERY

## 2022-08-02 PROCEDURE — 22845 INSERT SPINE FIXATION DEVICE: CPT | Mod: 59,,, | Performed by: NEUROLOGICAL SURGERY

## 2022-08-02 PROCEDURE — 27201423 OPTIME MED/SURG SUP & DEVICES STERILE SUPPLY: Performed by: NEUROLOGICAL SURGERY

## 2022-08-02 PROCEDURE — 71000016 HC POSTOP RECOV ADDL HR: Performed by: NEUROLOGICAL SURGERY

## 2022-08-02 PROCEDURE — 22853 INSJ BIOMECHANICAL DEVICE: CPT | Mod: AS,,, | Performed by: NURSE PRACTITIONER

## 2022-08-02 PROCEDURE — 36000711: Performed by: NEUROLOGICAL SURGERY

## 2022-08-02 PROCEDURE — 22845 PR ANTERIOR INSTRUMENTATION 2-3 VERTEBRAL SEGMENTS: ICD-10-PCS | Mod: 59,,, | Performed by: NEUROLOGICAL SURGERY

## 2022-08-02 PROCEDURE — 63600175 PHARM REV CODE 636 W HCPCS: Performed by: NURSE ANESTHETIST, CERTIFIED REGISTERED

## 2022-08-02 PROCEDURE — 11000001 HC ACUTE MED/SURG PRIVATE ROOM

## 2022-08-02 PROCEDURE — 22551 PR ARTHRODESIS ANT INTERBODY INC DISCECTOMY, CERVICAL BELOW C2: ICD-10-PCS | Mod: AS,,, | Performed by: NURSE PRACTITIONER

## 2022-08-02 PROCEDURE — 71000033 HC RECOVERY, INTIAL HOUR: Performed by: NEUROLOGICAL SURGERY

## 2022-08-02 PROCEDURE — 36000710: Performed by: NEUROLOGICAL SURGERY

## 2022-08-02 PROCEDURE — 22853 PR INSERT BIOMECH DEV W/INTERBODY ARTHRODESIS, EA CONTIGUOUS DEFECT: ICD-10-PCS | Mod: AS,,, | Performed by: NURSE PRACTITIONER

## 2022-08-02 PROCEDURE — 25000003 PHARM REV CODE 250: Performed by: NEUROLOGICAL SURGERY

## 2022-08-02 PROCEDURE — 63600175 PHARM REV CODE 636 W HCPCS: Performed by: NEUROLOGICAL SURGERY

## 2022-08-02 PROCEDURE — 25000003 PHARM REV CODE 250

## 2022-08-02 PROCEDURE — 37000009 HC ANESTHESIA EA ADD 15 MINS: Performed by: NEUROLOGICAL SURGERY

## 2022-08-02 PROCEDURE — 22845 INSERT SPINE FIXATION DEVICE: CPT | Mod: AS,59,, | Performed by: NURSE PRACTITIONER

## 2022-08-02 PROCEDURE — 25000003 PHARM REV CODE 250: Performed by: NURSE ANESTHETIST, CERTIFIED REGISTERED

## 2022-08-02 PROCEDURE — 22551 ARTHRD ANT NTRBDY CERVICAL: CPT | Mod: ,,, | Performed by: NEUROLOGICAL SURGERY

## 2022-08-02 PROCEDURE — 22552 PR ARTHRODESIS ANT INTERBODY INC DISCECTOMY, CERVICAL BELOW C2 EACH ADDL: ICD-10-PCS | Mod: AS,,, | Performed by: NURSE PRACTITIONER

## 2022-08-02 PROCEDURE — 22853 PR INSERT BIOMECH DEV W/INTERBODY ARTHRODESIS, EA CONTIGUOUS DEFECT: ICD-10-PCS | Mod: ,,, | Performed by: NEUROLOGICAL SURGERY

## 2022-08-02 PROCEDURE — 22552 PR ARTHRODESIS ANT INTERBODY INC DISCECTOMY, CERVICAL BELOW C2 EACH ADDL: ICD-10-PCS | Mod: ,,, | Performed by: NEUROLOGICAL SURGERY

## 2022-08-02 PROCEDURE — 22551 PR ARTHRODESIS ANT INTERBODY INC DISCECTOMY, CERVICAL BELOW C2: ICD-10-PCS | Mod: ,,, | Performed by: NEUROLOGICAL SURGERY

## 2022-08-02 PROCEDURE — 71000015 HC POSTOP RECOV 1ST HR: Performed by: NEUROLOGICAL SURGERY

## 2022-08-02 PROCEDURE — 22552 ARTHRD ANT NTRBD CERVICAL EA: CPT | Mod: AS,,, | Performed by: NURSE PRACTITIONER

## 2022-08-02 PROCEDURE — 63600175 PHARM REV CODE 636 W HCPCS: Performed by: ANESTHESIOLOGY

## 2022-08-02 PROCEDURE — 37000008 HC ANESTHESIA 1ST 15 MINUTES: Performed by: NEUROLOGICAL SURGERY

## 2022-08-02 PROCEDURE — 22551 ARTHRD ANT NTRBDY CERVICAL: CPT | Mod: AS,,, | Performed by: NURSE PRACTITIONER

## 2022-08-02 PROCEDURE — 63600175 PHARM REV CODE 636 W HCPCS

## 2022-08-02 PROCEDURE — 71000039 HC RECOVERY, EACH ADD'L HOUR: Performed by: NEUROLOGICAL SURGERY

## 2022-08-02 PROCEDURE — 20930 PR ALLOGRAFT FOR SPINE SURGERY ONLY MORSELIZED: ICD-10-PCS | Mod: ,,, | Performed by: NEUROLOGICAL SURGERY

## 2022-08-02 PROCEDURE — 22845 PR ANTERIOR INSTRUMENTATION 2-3 VERTEBRAL SEGMENTS: ICD-10-PCS | Mod: AS,59,, | Performed by: NURSE PRACTITIONER

## 2022-08-02 PROCEDURE — 20930 SP BONE ALGRFT MORSEL ADD-ON: CPT | Mod: ,,, | Performed by: NEUROLOGICAL SURGERY

## 2022-08-02 PROCEDURE — C1713 ANCHOR/SCREW BN/BN,TIS/BN: HCPCS | Performed by: NEUROLOGICAL SURGERY

## 2022-08-02 RX ORDER — HYDROMORPHONE HYDROCHLORIDE 2 MG/ML
INJECTION, SOLUTION INTRAMUSCULAR; INTRAVENOUS; SUBCUTANEOUS
Status: COMPLETED
Start: 2022-08-02 | End: 2022-08-02

## 2022-08-02 RX ORDER — DEXAMETHASONE SODIUM PHOSPHATE 4 MG/ML
INJECTION, SOLUTION INTRA-ARTICULAR; INTRALESIONAL; INTRAMUSCULAR; INTRAVENOUS; SOFT TISSUE
Status: DISCONTINUED | OUTPATIENT
Start: 2022-08-02 | End: 2022-08-02

## 2022-08-02 RX ORDER — ACETAMINOPHEN 10 MG/ML
INJECTION, SOLUTION INTRAVENOUS
Status: DISCONTINUED | OUTPATIENT
Start: 2022-08-02 | End: 2022-08-02

## 2022-08-02 RX ORDER — MUPIROCIN 20 MG/G
1 OINTMENT TOPICAL 2 TIMES DAILY
Status: DISPENSED | OUTPATIENT
Start: 2022-08-02 | End: 2022-08-03

## 2022-08-02 RX ORDER — MIDAZOLAM HYDROCHLORIDE 1 MG/ML
2 INJECTION INTRAMUSCULAR; INTRAVENOUS ONCE AS NEEDED
Status: CANCELLED | OUTPATIENT
Start: 2022-08-02 | End: 2033-12-29

## 2022-08-02 RX ORDER — HYDROMORPHONE HYDROCHLORIDE 2 MG/ML
0.5 INJECTION, SOLUTION INTRAMUSCULAR; INTRAVENOUS; SUBCUTANEOUS EVERY 5 MIN PRN
Status: COMPLETED | OUTPATIENT
Start: 2022-08-02 | End: 2022-08-02

## 2022-08-02 RX ORDER — ONDANSETRON 2 MG/ML
4 INJECTION INTRAMUSCULAR; INTRAVENOUS DAILY PRN
Status: DISCONTINUED | OUTPATIENT
Start: 2022-08-02 | End: 2022-08-03 | Stop reason: HOSPADM

## 2022-08-02 RX ORDER — ONDANSETRON HYDROCHLORIDE 2 MG/ML
INJECTION, SOLUTION INTRAMUSCULAR; INTRAVENOUS
Status: DISCONTINUED | OUTPATIENT
Start: 2022-08-02 | End: 2022-08-02

## 2022-08-02 RX ORDER — PROPOFOL 10 MG/ML
VIAL (ML) INTRAVENOUS CONTINUOUS PRN
Status: DISCONTINUED | OUTPATIENT
Start: 2022-08-02 | End: 2022-08-02

## 2022-08-02 RX ORDER — CEFAZOLIN SODIUM 2 G/50ML
2 SOLUTION INTRAVENOUS
Status: DISCONTINUED | OUTPATIENT
Start: 2022-08-02 | End: 2022-08-03

## 2022-08-02 RX ORDER — SODIUM CHLORIDE 9 MG/ML
INJECTION, SOLUTION INTRAVENOUS CONTINUOUS
Status: DISCONTINUED | OUTPATIENT
Start: 2022-08-02 | End: 2022-08-03 | Stop reason: HOSPADM

## 2022-08-02 RX ORDER — KETAMINE HYDROCHLORIDE 100 MG/ML
INJECTION, SOLUTION INTRAMUSCULAR; INTRAVENOUS
Status: DISCONTINUED | OUTPATIENT
Start: 2022-08-02 | End: 2022-08-02

## 2022-08-02 RX ORDER — HYDROMORPHONE HYDROCHLORIDE 2 MG/ML
0.2 INJECTION, SOLUTION INTRAMUSCULAR; INTRAVENOUS; SUBCUTANEOUS EVERY 5 MIN PRN
Status: DISCONTINUED | OUTPATIENT
Start: 2022-08-02 | End: 2022-08-02

## 2022-08-02 RX ORDER — BISACODYL 10 MG
10 SUPPOSITORY, RECTAL RECTAL DAILY PRN
Status: DISCONTINUED | OUTPATIENT
Start: 2022-08-02 | End: 2022-08-03 | Stop reason: HOSPADM

## 2022-08-02 RX ORDER — METHOCARBAMOL 100 MG/ML
INJECTION, SOLUTION INTRAMUSCULAR; INTRAVENOUS
Status: COMPLETED
Start: 2022-08-02 | End: 2022-08-02

## 2022-08-02 RX ORDER — AMOXICILLIN 250 MG
2 CAPSULE ORAL NIGHTLY PRN
Status: DISCONTINUED | OUTPATIENT
Start: 2022-08-02 | End: 2022-08-03 | Stop reason: HOSPADM

## 2022-08-02 RX ORDER — FENTANYL CITRATE 50 UG/ML
INJECTION, SOLUTION INTRAMUSCULAR; INTRAVENOUS
Status: DISCONTINUED | OUTPATIENT
Start: 2022-08-02 | End: 2022-08-02

## 2022-08-02 RX ORDER — LIDOCAINE HYDROCHLORIDE 20 MG/ML
INJECTION, SOLUTION EPIDURAL; INFILTRATION; INTRACAUDAL; PERINEURAL
Status: DISCONTINUED | OUTPATIENT
Start: 2022-08-02 | End: 2022-08-02

## 2022-08-02 RX ORDER — DIPHENHYDRAMINE HYDROCHLORIDE 50 MG/ML
INJECTION INTRAMUSCULAR; INTRAVENOUS
Status: DISCONTINUED | OUTPATIENT
Start: 2022-08-02 | End: 2022-08-02

## 2022-08-02 RX ORDER — PROPOFOL 10 MG/ML
VIAL (ML) INTRAVENOUS
Status: DISCONTINUED | OUTPATIENT
Start: 2022-08-02 | End: 2022-08-02

## 2022-08-02 RX ORDER — ACETAMINOPHEN 10 MG/ML
1000 INJECTION, SOLUTION INTRAVENOUS ONCE
Status: ACTIVE | OUTPATIENT
Start: 2022-08-02 | End: 2022-08-03

## 2022-08-02 RX ORDER — HYDROCODONE BITARTRATE AND ACETAMINOPHEN 7.5; 325 MG/1; MG/1
TABLET ORAL
Status: COMPLETED
Start: 2022-08-02 | End: 2022-08-02

## 2022-08-02 RX ORDER — METHOCARBAMOL 100 MG/ML
1000 INJECTION, SOLUTION INTRAMUSCULAR; INTRAVENOUS ONCE
Status: COMPLETED | OUTPATIENT
Start: 2022-08-02 | End: 2022-08-02

## 2022-08-02 RX ORDER — CEFAZOLIN SODIUM 1 G/3ML
INJECTION, POWDER, FOR SOLUTION INTRAMUSCULAR; INTRAVENOUS
Status: DISCONTINUED | OUTPATIENT
Start: 2022-08-02 | End: 2022-08-02 | Stop reason: HOSPADM

## 2022-08-02 RX ORDER — PROCHLORPERAZINE EDISYLATE 5 MG/ML
5 INJECTION INTRAMUSCULAR; INTRAVENOUS EVERY 6 HOURS PRN
Status: DISCONTINUED | OUTPATIENT
Start: 2022-08-02 | End: 2022-08-03 | Stop reason: HOSPADM

## 2022-08-02 RX ORDER — BUPIVACAINE HCL/EPINEPHRINE 0.5-1:200K
VIAL (ML) INJECTION
Status: DISCONTINUED | OUTPATIENT
Start: 2022-08-02 | End: 2022-08-02 | Stop reason: HOSPADM

## 2022-08-02 RX ORDER — SODIUM CHLORIDE, SODIUM GLUCONATE, SODIUM ACETATE, POTASSIUM CHLORIDE AND MAGNESIUM CHLORIDE 30; 37; 368; 526; 502 MG/100ML; MG/100ML; MG/100ML; MG/100ML; MG/100ML
1000 INJECTION, SOLUTION INTRAVENOUS CONTINUOUS
Status: CANCELLED | OUTPATIENT
Start: 2022-08-02 | End: 2022-09-01

## 2022-08-02 RX ORDER — MIDAZOLAM HYDROCHLORIDE 1 MG/ML
INJECTION INTRAMUSCULAR; INTRAVENOUS
Status: DISCONTINUED | OUTPATIENT
Start: 2022-08-02 | End: 2022-08-02

## 2022-08-02 RX ORDER — DIPHENHYDRAMINE HYDROCHLORIDE 50 MG/ML
25 INJECTION INTRAMUSCULAR; INTRAVENOUS EVERY 6 HOURS PRN
Status: DISCONTINUED | OUTPATIENT
Start: 2022-08-02 | End: 2022-08-03 | Stop reason: HOSPADM

## 2022-08-02 RX ORDER — MAG HYDROX/ALUMINUM HYD/SIMETH 200-200-20
30 SUSPENSION, ORAL (FINAL DOSE FORM) ORAL EVERY 4 HOURS PRN
Status: DISCONTINUED | OUTPATIENT
Start: 2022-08-02 | End: 2022-08-03 | Stop reason: HOSPADM

## 2022-08-02 RX ORDER — PHENYLEPHRINE HCL IN 0.9% NACL 1 MG/10 ML
SYRINGE (ML) INTRAVENOUS
Status: DISCONTINUED | OUTPATIENT
Start: 2022-08-02 | End: 2022-08-02

## 2022-08-02 RX ORDER — HYDROCODONE BITARTRATE AND ACETAMINOPHEN 7.5; 325 MG/1; MG/1
2 TABLET ORAL EVERY 4 HOURS PRN
Status: DISCONTINUED | OUTPATIENT
Start: 2022-08-02 | End: 2022-08-03 | Stop reason: HOSPADM

## 2022-08-02 RX ORDER — ROCURONIUM BROMIDE 10 MG/ML
INJECTION, SOLUTION INTRAVENOUS
Status: DISCONTINUED | OUTPATIENT
Start: 2022-08-02 | End: 2022-08-02

## 2022-08-02 RX ORDER — LIDOCAINE HYDROCHLORIDE 10 MG/ML
1 INJECTION, SOLUTION EPIDURAL; INFILTRATION; INTRACAUDAL; PERINEURAL ONCE
Status: CANCELLED | OUTPATIENT
Start: 2022-08-02 | End: 2022-08-02

## 2022-08-02 RX ADMIN — Medication 50 MCG: at 11:08

## 2022-08-02 RX ADMIN — HYDROMORPHONE HYDROCHLORIDE 0.4 MG: 2 INJECTION, SOLUTION INTRAMUSCULAR; INTRAVENOUS; SUBCUTANEOUS at 02:08

## 2022-08-02 RX ADMIN — MIDAZOLAM 2 MG: 1 INJECTION INTRAMUSCULAR; INTRAVENOUS at 11:08

## 2022-08-02 RX ADMIN — HYDROCODONE BITARTRATE AND ACETAMINOPHEN 1 TABLET: 7.5; 325 TABLET ORAL at 06:08

## 2022-08-02 RX ADMIN — KETAMINE HYDROCHLORIDE 10 MG: 100 INJECTION, SOLUTION, CONCENTRATE INTRAMUSCULAR; INTRAVENOUS at 12:08

## 2022-08-02 RX ADMIN — DIPHENHYDRAMINE HYDROCHLORIDE 12.5 MG: 50 INJECTION INTRAMUSCULAR; INTRAVENOUS at 11:08

## 2022-08-02 RX ADMIN — PROPOFOL 50 MG: 10 INJECTION, EMULSION INTRAVENOUS at 11:08

## 2022-08-02 RX ADMIN — METHOCARBAMOL 1000 MG: 100 INJECTION, SOLUTION INTRAMUSCULAR; INTRAVENOUS at 02:08

## 2022-08-02 RX ADMIN — DEXTROSE MONOHYDRATE 2 G: 50 INJECTION, SOLUTION INTRAVENOUS at 11:08

## 2022-08-02 RX ADMIN — HYDROMORPHONE HYDROCHLORIDE 0.4 MG: 2 INJECTION, SOLUTION INTRAMUSCULAR; INTRAVENOUS; SUBCUTANEOUS at 05:08

## 2022-08-02 RX ADMIN — ACETAMINOPHEN 1000 MG: 10 INJECTION, SOLUTION INTRAVENOUS at 01:08

## 2022-08-02 RX ADMIN — HYDROCODONE BITARTRATE AND ACETAMINOPHEN 1 TABLET: 7.5; 325 TABLET ORAL at 05:08

## 2022-08-02 RX ADMIN — REMIFENTANIL HYDROCHLORIDE 0.12 MCG/KG/MIN: 1 INJECTION, POWDER, LYOPHILIZED, FOR SOLUTION INTRAVENOUS at 11:08

## 2022-08-02 RX ADMIN — DEXAMETHASONE SODIUM PHOSPHATE 8 MG: 4 INJECTION, SOLUTION INTRA-ARTICULAR; INTRALESIONAL; INTRAMUSCULAR; INTRAVENOUS; SOFT TISSUE at 11:08

## 2022-08-02 RX ADMIN — PHENYLEPHRINE HYDROCHLORIDE 0.3 MCG/KG/MIN: 10 INJECTION INTRAVENOUS at 12:08

## 2022-08-02 RX ADMIN — HYDROCODONE BITARTRATE AND ACETAMINOPHEN 2 TABLET: 7.5; 325 TABLET ORAL at 09:08

## 2022-08-02 RX ADMIN — HYDROMORPHONE HYDROCHLORIDE 0.4 MG: 2 INJECTION, SOLUTION INTRAMUSCULAR; INTRAVENOUS; SUBCUTANEOUS at 03:08

## 2022-08-02 RX ADMIN — SODIUM CHLORIDE: 9 INJECTION, SOLUTION INTRAVENOUS at 06:08

## 2022-08-02 RX ADMIN — ROCURONIUM BROMIDE 10 MG: 10 SOLUTION INTRAVENOUS at 11:08

## 2022-08-02 RX ADMIN — KETAMINE HYDROCHLORIDE 25 MG: 100 INJECTION, SOLUTION, CONCENTRATE INTRAMUSCULAR; INTRAVENOUS at 11:08

## 2022-08-02 RX ADMIN — LIDOCAINE HYDROCHLORIDE 100 MG: 20 INJECTION, SOLUTION EPIDURAL; INFILTRATION; INTRACAUDAL; PERINEURAL at 11:08

## 2022-08-02 RX ADMIN — Medication 100 MCG: at 01:08

## 2022-08-02 RX ADMIN — FENTANYL CITRATE 100 MCG: 50 INJECTION, SOLUTION INTRAMUSCULAR; INTRAVENOUS at 11:08

## 2022-08-02 RX ADMIN — ONDANSETRON 4 MG: 2 INJECTION INTRAMUSCULAR; INTRAVENOUS at 11:08

## 2022-08-02 RX ADMIN — HYDROMORPHONE HYDROCHLORIDE 0.4 MG: 2 INJECTION, SOLUTION INTRAMUSCULAR; INTRAVENOUS; SUBCUTANEOUS at 04:08

## 2022-08-02 RX ADMIN — Medication 50 MCG: at 12:08

## 2022-08-02 RX ADMIN — Medication 200 MCG: at 12:08

## 2022-08-02 RX ADMIN — Medication 100 MCG: at 11:08

## 2022-08-02 RX ADMIN — SUGAMMADEX 50 MG: 100 INJECTION, SOLUTION INTRAVENOUS at 11:08

## 2022-08-02 RX ADMIN — SODIUM CHLORIDE, SODIUM GLUCONATE, SODIUM ACETATE, POTASSIUM CHLORIDE AND MAGNESIUM CHLORIDE: 526; 502; 368; 37; 30 INJECTION, SOLUTION INTRAVENOUS at 11:08

## 2022-08-02 RX ADMIN — PROPOFOL 200 MG: 10 INJECTION, EMULSION INTRAVENOUS at 11:08

## 2022-08-02 RX ADMIN — PROPOFOL 150 MCG/KG/MIN: 10 INJECTION, EMULSION INTRAVENOUS at 11:08

## 2022-08-02 NOTE — OP NOTE
DATE OF OPERATION:   August 2, 2022    PREOPERATIVE DIAGNOSIS:   1.  C5-6, C6-7 herniated disc and spondylosis with myeloradiculopathy    POSTOPERATIVE DIAGNOSIS:   1.  C5-6, C6-7 herniated disc and spondylosis with myeloradiculopathy    SURGEON:  Venkat Tsai M.D.    ASSISTANT: EMANUEL Bergeron     PROCEDURE:   1.  C5-6 anterior microdiscectomy and osteophytectomy   2.  C5-6 anterior arthrodesis with I-Factor allograft putty   3.  C5-6 anterior application of machined interbody device ([id number] Nuvasive Cohere porous PEEK cage)  4.  C6-7 anterior microdiscectomy and osteophytectomy   5.  C6-7 anterior arthrodesis with I-Factor allograft putty   6.  C6-7 anterior application of machined interbody device ([id number] Nuvasive Cohere porous PEEK cage)   7.  C5-6-7 anterior instrumentation with the SpineWave Defender cervical spine locking plate   8.  Microdissection for spinal procedure     ANESTHESIA:   General endotracheal     BLOOD LOSS:   100 cc     SPECIMEN(s):   Disc     DRAIN:   JUVENTINO in prevertebral space     COMPLICATIONS:   None     HISTORY:   Dayana Mejia is a 61-year-old woman who underwent left C5-6 C6-7 posterior foraminotomies on 06/20/2019.  She did very well until November of 2021 with increasing tingling numbness and pain in the left much greater than the right arm.  She also has trouble with dexterity.  Imaging study showed significant spondylosis with bilateral, left greater than right, foraminal stenosis the osteophyte formation.  There is moderate central stenosis also.  Clinically the patient had myelopathic signs on examination.  Options were discussed and, after conservative management failed, surgery was elected.  The patient understood and accepted the nature of this surgery as well as its attendant risks.     FINDINGS:   As expected there was severe disc degeneration and spondylosis at the index level.  There was significant spondylosis and nerve root impingement bilaterally.  There was  excellent nerve root and thecal sac decompression, nice reconstitution of interspace height and solid fixation with the interbody device and anterior instrumentation at the completion of the procedure.  The patient tolerated the procedure well.     PROCEDURE IN DETAIL:   After endotracheal intubation and induction of general anesthesia, the patient's head was placed on a doughnut and a roll was placed under the shoulders.  Intraoperative neuro monitoring electrodes into place and both EMG and SSEP monitoring were carried out continuously throughout the procedure. When appropriate, motor evoked potential monitoring was also carried out. The neck was prepped and draped in the usual fashion.  The patient received intravenous antibiotics prior to the start of the procedure.  Intraoperative C-arm fluoroscopy was used to both localize the incision and guide the placement of the graft and hardware.  An incision was marked out at the appropriate location and infiltrated with local anesthetic containing epinephrine.  The incision was carried down through the skin and subcutaneous tissues with a knife.  Unipolar cautery was used to incise the platysma.  Circumferential subplatysmal dissection was carried out to allow better retraction.  Then the fascial sheath was divided sharply and sharp and blunt dissection were carried out medial to the sternocleidomastoid and carotid sheath and lateral to the trachea and esophagus down to the level of the prevertebral fascia.  After confirming correct interspace localization, the longus colli musculature was undercut bilaterally at the operative levels. The self-retaining retractor system was put into place at the index level and then air was released from the endotracheal tube cuff to minimize pressure along the recurrent laryngeal nerve. The operating microscope was then brought into place.  The high-speed drill was used to drill down the anterior osteophytes to allow for a flat bed  for the instrumentation.  Then distraction screws were placed in the appropriate vertebral bodies.     The diskectomy was carried at C5-6 out with a combination of the high speed drill, straight and angled curets, pituitary rongeurs and Kerrison rongeurs.  Endplate osteophytes were undercut and the posterior longitudinal ligament was partially resected.  Uncinate process osteophytes  were then undercut bilaterally to allow for adequate neuroforaminal exiting of the nerve root.      Bleeding points were controlled temporarily with Gelfoam.  The trial devices for the appropriate interbody device were used. The device was packed with appropriate allograft material and the graft was tapped into place under modest distraction.  Attention was then paid to the C6-7 level where essentially the same procedure was carried out.    The appropriate sized anterior cervical spine locking plate was identified and appropriate sized screws were placed with appropriate locking maneuvers for each screw. C-arm was used to confirm appropriate placement of the interbody device and the anterior instrumentation. The wound was irrigated copiously with antibiotic irrigation. Bleeding points were controlled with bipolar cautery and Gelfoam.  The Gelfoam was then removed.  A drain was placed in the prevertebral space and brought out through a separate stab incision.  The wound was closed with 3-0 Vicryl for the platysma and a running 4-0 monocryl suture was used for the subcuticular layer..  Dermabond skin glue was used to cover the incision line.  The drain was secured with a Tegaderm.  The patient was taken to the post-anesthetic care unit in satisfactorily condition with correct sponge and needle counts.

## 2022-08-02 NOTE — TRANSFER OF CARE
"Anesthesia Transfer of Care Note    Patient: Dayana Mejia    Procedure(s) Performed: Procedure(s) (LRB):  DISCECTOMY, SPINE, CERVICAL, ANTERIOR APPROACH, WITH FUSION (N/A)    Patient location: PACU    Anesthesia Type: general    Transport from OR: Transported from OR on room air with adequate spontaneous ventilation    Post pain: adequate analgesia    Post assessment: no apparent anesthetic complications and tolerated procedure well    Post vital signs: stable    Level of consciousness: awake and responds to stimulation    Complications: none    Transfer of care protocol was followed      Last vitals:   Visit Vitals  /73   Pulse 94   Temp 37 °C (98.6 °F) (Oral)   Resp 20   Ht 5' 5" (1.651 m)   Wt 66 kg (145 lb 8.1 oz)   LMP  (LMP Unknown)   Breastfeeding No   BMI 24.21 kg/m²     " Yes

## 2022-08-02 NOTE — ANESTHESIA POSTPROCEDURE EVALUATION
Anesthesia Post Evaluation    Patient: aDyana Mejia    Procedure(s) Performed: Procedure(s) (LRB):  DISCECTOMY, SPINE, CERVICAL, ANTERIOR APPROACH, WITH FUSION (N/A)    Final Anesthesia Type: general      Patient location during evaluation: PACU  Patient participation: Yes- Able to Participate  Level of consciousness: awake and alert  Post-procedure vital signs: reviewed and stable  Pain management: adequate  Airway patency: patent      Anesthetic complications: no      Cardiovascular status: hemodynamically stable  Respiratory status: unassisted  Hydration status: euvolemic  Follow-up not needed.          Vitals Value Taken Time   /73 08/02/22 1501   Temp 36.7 08/02/22 1507   Pulse 98 08/02/22 1507   Resp 13 08/02/22 1507   SpO2 93 % 08/02/22 1507   Vitals shown include unvalidated device data.      No case tracking events are documented in the log.      Pain/Alfie Score: Pain Rating Prior to Med Admin: 7 (8/2/2022  3:00 PM)  Alfie Score: 8 (8/2/2022  2:18 PM)

## 2022-08-02 NOTE — BRIEF OP NOTE
[facility] - Periop Services  Brief Operative Note    SUMMARY     Surgery Date: 8/2/2022     Surgeon(s) and Role:     * Venkat Tsai MD - Primary    Assistant: Fannie Gutierrez NP    Pre-op Diagnosis:  Cervical spondylosis with myelopathy [M47.12]    Post-op Diagnosis:  Post-Op Diagnosis Codes:     * Cervical spondylosis with myelopathy [M47.12]    Procedure(s) (LRB):  DISCECTOMY, SPINE, CERVICAL, ANTERIOR APPROACH, WITH FUSION (N/A)  C5-6, C6-7 ACDF (spine wave 7mm 8 degree lordosis x2, spine wave plate, I-factor)  Microscope, C-arm, NTI    Anesthesia: General    Operative Findings: Patient tolerated procedure well and was transferred to PACU.       Estimated Blood Loss: 100cc  Estimated Blood Loss has been documented.         Specimens:   Specimen (24h ago, onward)             Start     Ordered    08/02/22 4516  Specimen to Pathology  RELEASE UPON ORDERING        References:    Click here for ordering Quick Tip   Question:  Release to patient  Answer:  Immediate    08/02/22 0986                [id number]

## 2022-08-02 NOTE — ANESTHESIA PREPROCEDURE EVALUATION
"                                                                                                             08/02/2022  Dayana Mejia is a 61 y.o. female with chronic neck pain s/p posterior foraminotomies.  Her pain persists, and she is scheduled for anterior discectomy today, C5-C7.  She denies cardiopulmonary complaints, and is easily capable of greater than 4 mets.    "CHIEF COMPLAINT:  Neck pain     HPI:  Dayana Mejia is a 61 y.o. female who presents for follow up after a course of physical thearpy.      he patient is known to Dr. Tsai for having undergone left C5-6 and C6-7 posterior foraminotomies on 06/20/2019.  She had resolution of her symptoms after that surgery.  She did well until around November of 2021. She began with tingling, numbness, and pain in the left arm that has progressively worsened.       Parent, she complains of neck pain with pain radiating into the left trapezius muscle, shoulder, upper arm, and through the dorsal forearm.  She rates her pain at 7/10.  There is tingling in the tips of all fingers.  Often, she awakened in the night with pain and numbness in the left arm.  She has difficulties with dexterity.  She has to use her right hand to do things due to decreased function in the left.  She is also dropping objects from the left hand.  Subjectively, she feels weak in the left arm and hand.  Conservative measures has included home cervical traction and a home exercise program.  In addition, she underwent a course of physical therapy.  There was no improvement in her symptoms.  Her neck pain has continued to worse.  Her arm symptoms are at a plateau.     She has decreased range of motion in the left shoulder.  She has been working on her range of motion at therapy and home.  She acknowledges she resist motion when others are stretching her due to pain.  At times, she feels her balance is a little off.  She denies disturbances in bowel or bladder function.              Past Medical " "History:   Diagnosis Date    Dyslipidemia      History of DVT (deep vein thrombosis)      History of renal cell carcinoma      HTN (hypertension)      IBS (irritable bowel syndrome)      Mild intermittent asthma, uncomplicated      OA (osteoarthritis)      Type 2 diabetes mellitus without complications                 Past Surgical History:   Procedure Laterality Date    BREAST MASS EXCISION Left 2012    CHOLECYSTECTOMY   1987     open    HERNIA REPAIR   1987    LAPAROSCOPIC NEPHRECTOMY Left 05/05/2017     Dr. Chucho Au    Left C5-6, C6-7 foraminotomies   06/20/2019     Dr. Tsai   "      Pre-op Assessment    I have reviewed the Patient Summary Reports.     I have reviewed the Nursing Notes. I have reviewed the NPO Status.   I have reviewed the Medications.     Review of Systems  Anesthesia Hx:  No problems with previous Anesthesia  Denies Family Hx of Anesthesia complications.   Denies Personal Hx of Anesthesia complications.   Cardiovascular:   Exercise tolerance: good Hypertension  Functional Capacity good / => 4 METS    Pulmonary:   Asthma mild    Musculoskeletal:   Arthritis   Spine Disorders: cervical    Neurological:   Neuromuscular Disease, Headaches        Physical Exam  General: Well nourished, Cooperative, Alert and Oriented    Airway:  Mallampati: II   Mouth Opening: Normal  TM Distance: Normal  Tongue: Normal  Neck ROM: Extension Decreased  Extension decreased but adequate  Dental:  Periodontal disease    Chest/Lungs:  Clear to auscultation, Normal Respiratory Rate    Heart:  Rate: Normal  Rhythm: Regular Rhythm        Anesthesia Plan  Type of Anesthesia, risks & benefits discussed:    Anesthesia Type: Gen ETT  Intra-op Monitoring Plan: Standard ASA Monitors  Post Op Pain Control Plan: multimodal analgesia and IV/PO Opioids PRN  Induction:  IV  Airway Plan: Direct, Post-Induction  Informed Consent: Informed consent signed with the Patient and all parties understand the risks and agree " with anesthesia plan.  All questions answered.   ASA Score: 2  Day of Surgery Review of History & Physical: H&P Update referred to the surgeon/provider.    Ready For Surgery From Anesthesia Perspective.     .

## 2022-08-02 NOTE — ANESTHESIA PROCEDURE NOTES
Intubation    Date/Time: 8/2/2022 11:24 AM  Performed by: Cinda Goddard CRNA  Authorized by: Cinda Goddard CRNA     Intubation:     Induction:  Intravenous    Intubated:  Postinduction    Mask Ventilation:  Easy mask    Attempts:  1    Attempted By:  Student    Method of Intubation:  Direct    Blade:  Wood 3    Laryngeal View Grade: Grade I - full view of cords      Difficult Airway Encountered?: No      Complications:  None    Airway Device:  Oral endotracheal tube    Airway Device Size:  7.0    Style/Cuff Inflation:  Cuffed (inflated to minimal occlusive pressure)    Tube secured:  21    Secured at:  The lips    Placement Verified By:  Capnometry    Complicating Factors:  None    Findings Post-Intubation:  BS equal bilateral and atraumatic/condition of teeth unchanged  Notes:      Head maintained in neutral position during intubation

## 2022-08-03 VITALS
HEART RATE: 75 BPM | TEMPERATURE: 98 F | SYSTOLIC BLOOD PRESSURE: 126 MMHG | WEIGHT: 145.5 LBS | OXYGEN SATURATION: 96 % | HEIGHT: 65 IN | RESPIRATION RATE: 19 BRPM | BODY MASS INDEX: 24.24 KG/M2 | DIASTOLIC BLOOD PRESSURE: 66 MMHG

## 2022-08-03 PROCEDURE — 25000242 PHARM REV CODE 250 ALT 637 W/ HCPCS: Performed by: NURSE PRACTITIONER

## 2022-08-03 PROCEDURE — 99024 PR POST-OP FOLLOW-UP VISIT: ICD-10-PCS | Mod: ,,, | Performed by: NURSE PRACTITIONER

## 2022-08-03 PROCEDURE — 99024 POSTOP FOLLOW-UP VISIT: CPT | Mod: ,,, | Performed by: NURSE PRACTITIONER

## 2022-08-03 PROCEDURE — 25000003 PHARM REV CODE 250: Performed by: NURSE PRACTITIONER

## 2022-08-03 RX ORDER — ONDANSETRON 4 MG/1
8 TABLET, ORALLY DISINTEGRATING ORAL EVERY 6 HOURS PRN
Status: DISCONTINUED | OUTPATIENT
Start: 2022-08-03 | End: 2022-08-03 | Stop reason: HOSPADM

## 2022-08-03 RX ORDER — METHOCARBAMOL 500 MG/1
1000 TABLET, FILM COATED ORAL 3 TIMES DAILY PRN
Status: DISCONTINUED | OUTPATIENT
Start: 2022-08-03 | End: 2022-08-03 | Stop reason: HOSPADM

## 2022-08-03 RX ORDER — METHYLPREDNISOLONE 4 MG/1
TABLET ORAL
Qty: 21 EACH | Refills: 0 | Status: SHIPPED | OUTPATIENT
Start: 2022-08-03 | End: 2022-08-24

## 2022-08-03 RX ORDER — HYDROCODONE BITARTRATE AND ACETAMINOPHEN 7.5; 325 MG/1; MG/1
1 TABLET ORAL EVERY 4 HOURS PRN
Qty: 42 TABLET | Refills: 0 | Status: SHIPPED | OUTPATIENT
Start: 2022-08-03 | End: 2022-08-11

## 2022-08-03 RX ORDER — GABAPENTIN 300 MG/1
300 CAPSULE ORAL 2 TIMES DAILY
Status: DISCONTINUED | OUTPATIENT
Start: 2022-08-03 | End: 2022-08-03 | Stop reason: HOSPADM

## 2022-08-03 RX ORDER — LUBIPROSTONE 24 UG/1
24 CAPSULE ORAL DAILY PRN
Status: DISCONTINUED | OUTPATIENT
Start: 2022-08-03 | End: 2022-08-03 | Stop reason: HOSPADM

## 2022-08-03 RX ORDER — HYDROCODONE BITARTRATE AND ACETAMINOPHEN 7.5; 325 MG/1; MG/1
1 TABLET ORAL EVERY 4 HOURS PRN
Status: DISCONTINUED | OUTPATIENT
Start: 2022-08-03 | End: 2022-08-03 | Stop reason: HOSPADM

## 2022-08-03 RX ORDER — FLUTICASONE FUROATE AND VILANTEROL 200; 25 UG/1; UG/1
1 POWDER RESPIRATORY (INHALATION) DAILY
Status: DISCONTINUED | OUTPATIENT
Start: 2022-08-03 | End: 2022-08-03 | Stop reason: HOSPADM

## 2022-08-03 RX ORDER — TALC
6 POWDER (GRAM) TOPICAL NIGHTLY PRN
Status: DISCONTINUED | OUTPATIENT
Start: 2022-08-03 | End: 2022-08-03 | Stop reason: HOSPADM

## 2022-08-03 RX ORDER — PRAVASTATIN SODIUM 40 MG/1
40 TABLET ORAL DAILY
Status: DISCONTINUED | OUTPATIENT
Start: 2022-08-03 | End: 2022-08-03 | Stop reason: HOSPADM

## 2022-08-03 RX ORDER — METHOCARBAMOL 500 MG/1
1000 TABLET, FILM COATED ORAL 3 TIMES DAILY PRN
Qty: 40 TABLET | Refills: 2 | Status: SHIPPED | OUTPATIENT
Start: 2022-08-03 | End: 2022-08-13

## 2022-08-03 RX ORDER — BUDESONIDE AND FORMOTEROL FUMARATE DIHYDRATE 160; 4.5 UG/1; UG/1
1 AEROSOL RESPIRATORY (INHALATION) EVERY 12 HOURS
Status: DISCONTINUED | OUTPATIENT
Start: 2022-08-03 | End: 2022-08-03 | Stop reason: CLARIF

## 2022-08-03 RX ORDER — METFORMIN HYDROCHLORIDE 500 MG/1
500 TABLET ORAL 2 TIMES DAILY
Status: DISCONTINUED | OUTPATIENT
Start: 2022-08-03 | End: 2022-08-03 | Stop reason: HOSPADM

## 2022-08-03 RX ORDER — TRIAMTERENE AND HYDROCHLOROTHIAZIDE 37.5; 25 MG/1; MG/1
1 CAPSULE ORAL DAILY
Status: DISCONTINUED | OUTPATIENT
Start: 2022-08-03 | End: 2022-08-03 | Stop reason: HOSPADM

## 2022-08-03 RX ORDER — RALOXIFENE HYDROCHLORIDE 60 MG/1
60 TABLET, FILM COATED ORAL DAILY
Status: DISCONTINUED | OUTPATIENT
Start: 2022-08-03 | End: 2022-08-03 | Stop reason: HOSPADM

## 2022-08-03 RX ADMIN — PRAVASTATIN SODIUM 40 MG: 40 TABLET ORAL at 09:08

## 2022-08-03 RX ADMIN — HYDROCODONE BITARTRATE AND ACETAMINOPHEN 2 TABLET: 7.5; 325 TABLET ORAL at 05:08

## 2022-08-03 RX ADMIN — METFORMIN HYDROCHLORIDE 500 MG: 500 TABLET, FILM COATED ORAL at 09:08

## 2022-08-03 RX ADMIN — HYDROCODONE BITARTRATE AND ACETAMINOPHEN 2 TABLET: 7.5; 325 TABLET ORAL at 01:08

## 2022-08-03 RX ADMIN — GABAPENTIN 300 MG: 300 CAPSULE ORAL at 09:08

## 2022-08-03 RX ADMIN — FLUTICASONE FUROATE AND VILANTEROL TRIFENATATE 1 PUFF: 200; 25 POWDER RESPIRATORY (INHALATION) at 09:08

## 2022-08-03 RX ADMIN — TRIAMTERENE AND HYDROCHLOROTHIAZIDE 1 CAPSULE: 37.5; 25 CAPSULE ORAL at 09:08

## 2022-08-03 RX ADMIN — HYDROCODONE BITARTRATE AND ACETAMINOPHEN 2 TABLET: 7.5; 325 TABLET ORAL at 09:08

## 2022-08-03 RX ADMIN — RALOXIFENE HYDROCHLORIDE 60 MG: 60 TABLET, FILM COATED ORAL at 09:08

## 2022-08-03 NOTE — DISCHARGE INSTRUCTIONS
-Continue soft diet until throat pain resolved. If you develop difficulties swallowing or coughing with food/drink intake, start oral steroids.  -OK to shower. Do not submerge incision under water  -Soft collar with activity  -Call with any new/worsening pain, numbness, tingling or weakness  -Call with fever > 100  -she will be sent home with a JUVENTINO drain.  She was instructed on drain care and to call the office in the morning with drain output.

## 2022-08-03 NOTE — NURSING
Patient and spouse present for discharge teaching. Both understand the importance of follow up appointment and when to call the doctor with any concerns. Medications were delivered to room. JUVENTINO drain in place per md. Iv was removed without complications. Patient stable upon discharge

## 2022-08-03 NOTE — PROGRESS NOTES
[facility]  Office Visit  Neurosurgery      Dayana Mejia   [id number]   1960       CHIEF COMPLAINT:  Neck pain    HPI:  Dayana Mejia is a 61 y.o. female who presents for pre-operative appointment. The patient is known to Dr. Tsai for having undergone left C5-6 and C6-7 posterior foraminotomies on 06/20/2019.  She had resolution of her symptoms after that surgery.  She did well until around November of 2021, when she began with tingling, numbness, and pain in the left arm that has progressively worsened.   she has treated conservatively with home cervical traction and exercise program.  She has also undergone a course of physical therapy.  There has been no improvement in her symptoms with ongoing conservative treatment.  Over time, her symptoms have continued to progress.  Due to failure of conservative measures, surgery has been recommended.  She presents today for preoperative appointment.    Her pain has continued to progress since her last appointment.  The patient continues with neck pain and pain radiating into the left greater than right trapezius muscle.  She has pain radiating into the left shoulder, upper arm, and the dorsal forearm.  She reports tingling in the tips of all of her fingers.  She is often awaken at night due to the pain and numbness in her left arm.  She had difficulties with dexterity.  She is left-handed.  She frequently drops objects from the left hand.  She has subjective weakness in the left arm and hand.  She does have some decreased range of motion in the left shoulder which she has been working on in therapy.  She does have some balance issues, reports these are unchanged.  She denies any changes in bowel or bladder function.    Past Medical History:   Diagnosis Date    Anxiety     Dyslipidemia     Granuloma annulare     History of DVT (deep vein thrombosis) 2015    History of renal cell carcinoma     HTN (hypertension)     IBS (irritable bowel syndrome)      Left arm pain     Left arm weakness     Left shoulder pain     Migraine     Mild intermittent asthma, uncomplicated     Neck pain     Neuropathy, arm, left     OA (osteoarthritis)     Type 2 diabetes mellitus without complications        Past Surgical History:   Procedure Laterality Date    BREAST MASS EXCISION Left 2012    CHOLECYSTECTOMY  1987    open    COLONOSCOPY      HERNIA REPAIR  1987    LAPAROSCOPIC NEPHRECTOMY Left 05/05/2017    Dr. Chucho Au    Left C5-6, C6-7 foraminotomies  06/20/2019    Dr. Tsai       Family History   Problem Relation Age of Onset    Coronary artery disease Mother     Hypertension Mother     Stroke Mother     Hyperlipidemia Mother     Cancer Father     Coronary artery disease Father        Social History     Socioeconomic History    Marital status:    Tobacco Use    Smoking status: Current Every Day Smoker     Packs/day: 0.50     Years: 45.00     Pack years: 22.50     Types: Cigarettes    Smokeless tobacco: Never Used   Substance and Sexual Activity    Alcohol use: Yes     Alcohol/week: 6.0 standard drinks     Types: 3 Glasses of wine, 3 Cans of beer per week       Current Outpatient Medications   Medication Sig Dispense Refill    AMITIZA 24 mcg Cap Take 24 mcg by mouth daily as needed.      biotin 10,000 mcg Cap Take by mouth 2 (two) times a day.      cetirizine (ZYRTEC) 10 MG tablet Take 10 mg by mouth once daily.      gabapentin (NEURONTIN) 300 MG capsule 2 (two) times daily.      metFORMIN (GLUCOPHAGE) 500 MG tablet 2 (two) times a day.      NON FORMULARY MEDICATION Take 1 tablet by mouth 2 (two) times a day. Calcium 500+ Vit D and Magnesium      raloxifene (EVISTA) 60 mg tablet Take 60 mg by mouth once daily.      rosuvastatin (CRESTOR) 40 MG Tab Take 40 mg by mouth once daily at 6am.      SYMBICORT 160-4.5 mcg/actuation HFAA Inhale 1 puff into the lungs every 12 (twelve) hours.      temazepam (RESTORIL) 15 mg Cap Take 15 mg by mouth  "nightly as needed.      triamterene-hydrochlorothiazide 37.5-25 mg (MAXZIDE-25) 37.5-25 mg per tablet Take 1 tablet by mouth once daily.      VASCEPA 1 gram Cap Take 1 capsule by mouth 2 (two) times a day.      ascorbic acid, vitamin C, (VITAMIN C) 1000 MG tablet Take 1,000 mg by mouth once daily.      HYDROcodone-acetaminophen (NORCO) 7.5-325 mg per tablet Take 1 tablet by mouth every 4 (four) hours as needed for Pain. 42 tablet 0    methocarbamoL (ROBAXIN) 500 MG Tab Take 2 tablets (1,000 mg total) by mouth 3 (three) times daily as needed (muscle spasms). 40 tablet 2    methylPREDNISolone (MEDROL DOSEPACK) 4 mg tablet use as directed 21 each 0     No current facility-administered medications for this visit.       Review of patient's allergies indicates:   Allergen Reactions    Codeine Nausea And Vomiting     Other reaction(s): Vomiting (disorder)          Review of Systems   Constitutional: Negative for chills and fever.   HENT: Negative for nosebleeds and sore throat.    Eyes: Negative for pain and visual disturbance.   Respiratory: Negative for cough, chest tightness and shortness of breath.    Cardiovascular: Negative for chest pain.   Gastrointestinal: Negative for diarrhea, nausea and vomiting.   Genitourinary: Negative for difficulty urinating, dysuria and hematuria.   Musculoskeletal: Negative for gait problem and myalgias.   Skin: Negative for rash.   Neurological: Negative for dizziness, facial asymmetry and headaches.   Psychiatric/Behavioral: Negative for confusion and sleep disturbance. The patient is not nervous/anxious.        Physical Examination:    /70 (BP Location: Other (Comment), Patient Position: Sitting)   Pulse 89   Resp 16   Ht 5' 5" (1.651 m)   Wt 67.1 kg (148 lb)   LMP  (LMP Unknown)   BMI 24.63 kg/m²     General:  Pleasant. Well-nourished. Well-groomed.    CV:  Heart has regular rate and rhythm.  There are no carotid bruits.    Lungs:  Clear to ausculation " bilaterally.  Breathing is quiet, non-labored     Abdomen:  Soft, non-tender, non-distended.    Musculoskeletal:  Cervical ROM: Moderately limited with extension and bilateral rotation.  Pain is increased in each direction.  Tinel's is negative at bilateral wrist and elbows.  Spurling's maneuver is negative bilaterally.    Neurological:    Oriented to Person, Place, Time   Muscle strength against resistance:  Strength  Deltoids Triceps Biceps Wrist Extension Wrist Flexion Hand    Upper: R 5/5 5/5 5/5 5/5 5/5 5/5    L 5/5 4/5 4+/5 5/5 5/5 5-/5   Sensation is intact to primary modalities in bilateral upper extremities.  Reflexes:   Right Left   Triceps 2+ 2+   Biceps 3+ 3+   Brachioradialis 3+ 3+   Patellar 2+ 2+   Achilles 2+ 2+   Tai's sign is positive bilaterally.  Gait: normal  Coordination is normal    ASSESSMENT/PLAN:     1. Cervical spondylosis with myelopathy  CBC Auto Differential   2. Asthma, unspecified asthma severity, unspecified whether complicated, unspecified whether persistent  X-Ray Chest PA And Lateral   3. Gastroesophageal reflux disease, unspecified whether esophagitis present  EKG 12-lead   4. Type 2 diabetes mellitus without complication, unspecified whether long term insulin use  Comprehensive Metabolic Panel       PLAN  The nature of the procedure, as well as its attendant risks, were discussed in detail with the patient.  All questions were answered.  They are agreement with proceeding with surgery as planned, and are tentatively scheduled for C5-6, C6-7 ACDF on 08/02/2022.

## 2022-08-03 NOTE — HOSPITAL COURSE
The patient was taken to the operating room on 08/02/2022 for C5-6, C6-7 ACDF.  She tolerated the procedure well was admitted to the ortho neuro floor postoperatively.  This morning she reports significant improvement in her preoperative left trapezius and arm pain.  She does continue with some soreness in her trapezius muscle and upper arm, however it is greatly improved.  She continues with some tingling in the fingertips, which is also significantly improved.  She denies any new arm pain, numbness, tingling.  Her left hand feels stronger.  She continues with some posterior cervical pain and interscapular spasms which are worse now compared to before surgery as expected.  She reports odynophagia.  She does not feel as though she has any difficulty swallowing.  She is not coughing with oral intake.  She is ambulating in her room without any issues.  She is tolerating p.o. intake.  She is voiding without issues.  She is stable for discharge home in improved state with a good prognosis.

## 2022-08-03 NOTE — DISCHARGE SUMMARY
[facility]  Neurosurgery  Discharge Summary      Patient Name: Dayana Mejia  MRN: [id number]  Admission Date: 8/2/2022  Hospital Length of Stay: 1 days  Discharge Date and Time:  08/03/2022 11:46 AM  Attending Physician: Venkat Tsai MD   Discharging Provider: [facility]  Primary Care Provider: Vinayak Partida MD    HPI:   No notes on file    Procedure(s) (LRB):  DISCECTOMY, SPINE, CERVICAL, ANTERIOR APPROACH, WITH FUSION (N/A)     Hospital Course: The patient was taken to the operating room on 08/02/2022 for C5-6, C6-7 ACDF.  She tolerated the procedure well was admitted to the ortho neuro floor postoperatively.  This morning she reports significant improvement in her preoperative left trapezius and arm pain.  She does continue with some soreness in her trapezius muscle and upper arm, however it is greatly improved.  She continues with some tingling in the fingertips, which is also significantly improved.  She denies any new arm pain, numbness, tingling.  Her left hand feels stronger.  She continues with some posterior cervical pain and interscapular spasms which are worse now compared to before surgery as expected.  She reports odynophagia.  She does not feel as though she has any difficulty swallowing.  She is not coughing with oral intake.  She is ambulating in her room without any issues.  She is tolerating p.o. intake.  She is voiding without issues.  She is stable for discharge home in improved state with a good prognosis.          Pending Diagnostic Studies:     Procedure Component Value Units Date/Time    Specimen to Pathology [[id number]] Collected: 08/02/22 1345    Order Status: Sent Lab Status: In process Updated: 08/03/22 0949    Specimen: Tissue from Disc         Final Active Diagnoses:    Diagnosis Date Noted POA    PRINCIPAL PROBLEM:  Cervical spondylosis with myelopathy [M47.12] 06/01/2022 Yes      Problems Resolved During this Admission:      Discharged  Condition: good     Disposition: Home or Self Care    Follow Up:   Follow-up Information     Venkat Tsai MD. Go on 8/22/2022.    Specialty: Neurosurgery  Why: at 9:00 with XRs before appointment  Contact information:  [address] [place] [address] [address]  [place] [address] [phone]                       Patient Instructions:      COVID-19 Routine Screening   Standing Status: Future Number of Occurrences: 1 Standing Exp. Date: 09/27/23     Order Specific Question Answer Comments   Is the patient symptomatic? No    Is this needed for pre-procedure or pre-op testing? Yes    Diagnosis: Pre-op testing [[id number]]      Medications:  Reconciled Home Medications:      Medication List      START taking these medications    HYDROcodone-acetaminophen 7.5-325 mg per tablet  Commonly known as: NORCO  Take 1 tablet by mouth every 4 (four) hours as needed for Pain.     methocarbamoL 500 MG Tab  Commonly known as: ROBAXIN  Take 2 tablets (1,000 mg total) by mouth 3 (three) times daily as needed (muscle spasms).     methylPREDNISolone 4 mg tablet  Commonly known as: MEDROL DOSEPACK  use as directed        CONTINUE taking these medications    AMITIZA 24 MCG Cap  Generic drug: lubiprostone  Take 24 mcg by mouth daily as needed.     ascorbic acid (vitamin C) 1000 MG tablet  Commonly known as: VITAMIN C  Take 1,000 mg by mouth once daily.     biotin 10,000 mcg Cap  Take by mouth 2 (two) times a day.     cetirizine 10 MG tablet  Commonly known as: ZYRTEC  Take 10 mg by mouth once daily.     gabapentin 300 MG capsule  Commonly known as: NEURONTIN  2 (two) times daily.     metFORMIN 500 MG tablet  Commonly known as: GLUCOPHAGE  2 (two) times a day.     NON FORMULARY MEDICATION  Take 1 tablet by mouth 2 (two) times a day. Calcium 500+ Vit D and Magnesium     raloxifene 60 mg tablet  Commonly known as: EVISTA  Take 60 mg by mouth once daily.     rosuvastatin 40 MG Tab  Commonly known as: CRESTOR  Take 40 mg by mouth once daily at 6am.      SYMBICORT 160-4.5 mcg/actuation [facility]  Generic drug: budesonide-formoterol 160-4.5 mcg  Inhale 1 puff into the lungs every 12 (twelve) hours.     temazepam 15 mg Cap  Commonly known as: RESTORIL  Take 15 mg by mouth nightly as needed.     triamterene-hydrochlorothiazide 37.5-25 mg 37.5-25 mg per tablet  Commonly known as: MAXZIDE-25  Take 1 tablet by mouth once daily.     VASCEPA 1 gram Cap  Generic drug: icosapent ethyL  Take 1 capsule by mouth 2 (two) times a day.        STOP taking these medications    diclofenac 50 MG EC tablet  Commonly known as: VOLTAREN     RINVOQ 15 mg 24 hr tablet  Generic drug: upadacitinib            DUNCAN JuarezP-BC  Neurosurgery  [facility] - Ortho Neuro

## 2022-08-04 ENCOUNTER — PATIENT OUTREACH (OUTPATIENT)
Dept: ADMINISTRATIVE | Facility: CLINIC | Age: 62
End: 2022-08-04
Payer: COMMERCIAL

## 2022-08-04 LAB
DHEA SERPL-MCNC: NORMAL
ESTROGEN SERPL-MCNC: NORMAL PG/ML
INSULIN SERPL-ACNC: NORMAL U[IU]/ML
LAB AP CLINICAL INFORMATION: NORMAL
LAB AP GROSS DESCRIPTION: NORMAL
LAB AP REPORT FOOTNOTES: NORMAL
T3RU NFR SERPL: NORMAL %

## 2022-08-04 NOTE — PROGRESS NOTES
C3 nurse spoke with Dayana Mejia for a TCC post hospital discharge follow up call. The patient has a scheduled [facility] appointment with Dr. Venkat Tsai (Neurosurgery) on 8/22/2022 @ 0900.

## 2022-08-08 ENCOUNTER — TELEPHONE (OUTPATIENT)
Dept: NEUROSURGERY | Facility: CLINIC | Age: 62
End: 2022-08-08
Payer: COMMERCIAL

## 2022-08-08 DIAGNOSIS — M47.12 CERVICAL SPONDYLOSIS WITH MYELOPATHY: Primary | ICD-10-CM

## 2022-08-08 NOTE — TELEPHONE ENCOUNTER
Patient called. She is s/p C5-6, C6-7 ACDF 8/2/22. She was doing well up until yesterday when she began with aching pain to right shoulder to elbow. Prior to surgery, her left arm was the issue, not right. Denies weakness. She did wash dishes this weekend. She is taking Hydrocodone 7.5, 1 q4, Robaxin 500, 2 tid and Gabapentin 300 bid. Meds are less effective since this pain began. She was sent home with an MDP in case she needed it, has not taken it yet. Should she start the MDP & maybe increase Gabapentin? Or anything else to do?

## 2022-08-08 NOTE — TELEPHONE ENCOUNTER
Spoke with patient. She will start MDP tomorrow since it's a little later in the day and will increase gabapentin. She will call if things do not improve.

## 2022-08-11 DIAGNOSIS — M47.12 CERVICAL SPONDYLOSIS WITH MYELOPATHY: Primary | ICD-10-CM

## 2022-08-11 DIAGNOSIS — G89.18 ACUTE POST-OPERATIVE PAIN: ICD-10-CM

## 2022-08-11 RX ORDER — HYDROCODONE BITARTRATE AND ACETAMINOPHEN 10; 325 MG/1; MG/1
1 TABLET ORAL EVERY 6 HOURS PRN
Qty: 40 TABLET | Refills: 0 | Status: SHIPPED | OUTPATIENT
Start: 2022-08-11 | End: 2022-08-22 | Stop reason: SDUPTHER

## 2022-08-11 RX ORDER — HYDROCODONE BITARTRATE AND ACETAMINOPHEN 7.5; 325 MG/1; MG/1
1 TABLET ORAL 4 TIMES DAILY PRN
Qty: 40 TABLET | Refills: 0 | Status: CANCELLED | OUTPATIENT
Start: 2022-08-11

## 2022-08-22 ENCOUNTER — OFFICE VISIT (OUTPATIENT)
Dept: NEUROSURGERY | Facility: CLINIC | Age: 62
End: 2022-08-22
Payer: COMMERCIAL

## 2022-08-22 ENCOUNTER — HOSPITAL ENCOUNTER (OUTPATIENT)
Dept: RADIOLOGY | Facility: HOSPITAL | Age: 62
Discharge: HOME | End: 2022-08-22
Attending: NURSE PRACTITIONER
Payer: COMMERCIAL

## 2022-08-22 VITALS
HEART RATE: 78 BPM | RESPIRATION RATE: 16 BRPM | SYSTOLIC BLOOD PRESSURE: 136 MMHG | HEIGHT: 65 IN | DIASTOLIC BLOOD PRESSURE: 76 MMHG | WEIGHT: 147 LBS | BODY MASS INDEX: 24.49 KG/M2

## 2022-08-22 DIAGNOSIS — M47.12 CERVICAL SPONDYLOSIS WITH MYELOPATHY: ICD-10-CM

## 2022-08-22 DIAGNOSIS — M47.22 CERVICAL SPONDYLOSIS WITH RADICULOPATHY: Primary | ICD-10-CM

## 2022-08-22 DIAGNOSIS — G89.18 ACUTE POST-OPERATIVE PAIN: ICD-10-CM

## 2022-08-22 PROCEDURE — 3008F PR BODY MASS INDEX (BMI) DOCUMENTED: ICD-10-PCS | Mod: CPTII,,, | Performed by: NURSE PRACTITIONER

## 2022-08-22 PROCEDURE — 1159F PR MEDICATION LIST DOCUMENTED IN MEDICAL RECORD: ICD-10-PCS | Mod: CPTII,,, | Performed by: NURSE PRACTITIONER

## 2022-08-22 PROCEDURE — 72040 X-RAY EXAM NECK SPINE 2-3 VW: CPT | Mod: TC

## 2022-08-22 PROCEDURE — 3075F SYST BP GE 130 - 139MM HG: CPT | Mod: CPTII,,, | Performed by: NURSE PRACTITIONER

## 2022-08-22 PROCEDURE — 99024 PR POST-OP FOLLOW-UP VISIT: ICD-10-PCS | Mod: ,,, | Performed by: NURSE PRACTITIONER

## 2022-08-22 PROCEDURE — 3078F DIAST BP <80 MM HG: CPT | Mod: CPTII,,, | Performed by: NURSE PRACTITIONER

## 2022-08-22 PROCEDURE — 1159F MED LIST DOCD IN RCRD: CPT | Mod: CPTII,,, | Performed by: NURSE PRACTITIONER

## 2022-08-22 PROCEDURE — 3008F BODY MASS INDEX DOCD: CPT | Mod: CPTII,,, | Performed by: NURSE PRACTITIONER

## 2022-08-22 PROCEDURE — 3078F PR MOST RECENT DIASTOLIC BLOOD PRESSURE < 80 MM HG: ICD-10-PCS | Mod: CPTII,,, | Performed by: NURSE PRACTITIONER

## 2022-08-22 PROCEDURE — 3075F PR MOST RECENT SYSTOLIC BLOOD PRESS GE 130-139MM HG: ICD-10-PCS | Mod: CPTII,,, | Performed by: NURSE PRACTITIONER

## 2022-08-22 PROCEDURE — 99024 POSTOP FOLLOW-UP VISIT: CPT | Mod: ,,, | Performed by: NURSE PRACTITIONER

## 2022-08-22 RX ORDER — METHOCARBAMOL 500 MG/1
1000 TABLET, FILM COATED ORAL 3 TIMES DAILY PRN
COMMUNITY
Start: 2022-08-15 | End: 2022-08-22 | Stop reason: SDUPTHER

## 2022-08-22 RX ORDER — HYDROCODONE BITARTRATE AND ACETAMINOPHEN 10; 325 MG/1; MG/1
1 TABLET ORAL EVERY 8 HOURS PRN
Qty: 21 TABLET | Refills: 0 | Status: SHIPPED | OUTPATIENT
Start: 2022-08-22 | End: 2022-08-30 | Stop reason: SDUPTHER

## 2022-08-22 RX ORDER — METHOCARBAMOL 500 MG/1
1000 TABLET, FILM COATED ORAL 3 TIMES DAILY PRN
Qty: 40 TABLET | Refills: 2 | Status: SHIPPED | OUTPATIENT
Start: 2022-08-22 | End: 2022-11-09

## 2022-08-22 RX ORDER — AZELASTINE 1 MG/ML
1 SPRAY, METERED NASAL 2 TIMES DAILY
COMMUNITY
Start: 2022-08-18

## 2022-08-22 NOTE — PROGRESS NOTES
[facility] Neurosurgery              Post-Operative Visit    Dayana Mejia  [id number]  1960    HPI:  Dayana Mejia is a 62 y.o.-year-old female who presents today for 3 week post-operative follow-up.  She is s/p C5-6, C6-7 ACDF that was done on 8/2/2022.  Currently, the patient's symptoms are better since surgery.  The patient is complaining of pain in the neck.  She denies any new onset of weakness.      The patient is doing very well. She reports resolution in her preoperative arm pain. She continues with aching/pain in her posterior>anterior neck and the tops of her shoulders. This pain is gradually improving. She denies any new issues with swallowing. At times, she will feel as though food is tight in her throat, but this is improving gradually. She denies any new weakness, numbness, tingling or pain in the arms. She is taking Norco TID and robaxin TID prn.    Review of patient's allergies indicates:   Allergen Reactions    Codeine Nausea And Vomiting     Other reaction(s): Vomiting (disorder)       Current Outpatient Medications   Medication Sig Dispense Refill    AMITIZA 24 mcg Cap Take 24 mcg by mouth daily as needed.      ascorbic acid, vitamin C, (VITAMIN C) 1000 MG tablet Take 1,000 mg by mouth once daily.      azelastine (ASTELIN) 137 mcg (0.1 %) nasal spray 1 spray 2 (two) times daily.      biotin 10,000 mcg Cap Take by mouth 2 (two) times a day.      cetirizine (ZYRTEC) 10 MG tablet Take 10 mg by mouth once daily.      gabapentin (NEURONTIN) 300 MG capsule 2 (two) times daily.      HYDROcodone-acetaminophen (NORCO)  mg per tablet Take 1 tablet by mouth every 6 (six) hours as needed for Pain. 40 tablet 0    metFORMIN (GLUCOPHAGE) 500 MG tablet 2 (two) times a day.      methocarbamoL (ROBAXIN) 500 MG Tab Take 1,000 mg by mouth 3 (three) times daily as needed.      NON FORMULARY MEDICATION Take 1 tablet by mouth 2 (two) times a day. Calcium 500+ Vit D and Magnesium      raloxifene  (EVISTA) 60 mg tablet Take 60 mg by mouth once daily.      rosuvastatin (CRESTOR) 40 MG Tab Take 40 mg by mouth once daily at 6am.      SYMBICORT 160-4.5 mcg/actuation HFAA Inhale 1 puff into the lungs every 12 (twelve) hours.      temazepam (RESTORIL) 15 mg Cap Take 15 mg by mouth nightly as needed.      triamterene-hydrochlorothiazide 37.5-25 mg (MAXZIDE-25) 37.5-25 mg per tablet Take 1 tablet by mouth once daily.      VASCEPA 1 gram Cap Take 1 capsule by mouth 2 (two) times a day.      methylPREDNISolone (MEDROL DOSEPACK) 4 mg tablet use as directed (Patient not taking: No sig reported) 21 each 0     No current facility-administered medications for this visit.     Patient Active Problem List    Diagnosis Date Noted    Cervical spondylosis with myelopathy 06/01/2022    Age-related osteoporosis without current pathological fracture 06/01/2022     Past Medical History:   Diagnosis Date    Anxiety     Dyslipidemia     Granuloma annulare     History of DVT (deep vein thrombosis) 2015    History of renal cell carcinoma     HTN (hypertension)     IBS (irritable bowel syndrome)     Left arm pain     Left arm weakness     Left shoulder pain     Migraine     Mild intermittent asthma, uncomplicated     Neck pain     Neuropathy, arm, left     OA (osteoarthritis)     Type 2 diabetes mellitus without complications      Past Surgical History:   Procedure Laterality Date    ANTERIOR CERVICAL DISCECTOMY W/ FUSION N/A 8/2/2022    Procedure: DISCECTOMY, SPINE, CERVICAL, ANTERIOR APPROACH, WITH FUSION;  Surgeon: Venkat Tsai MD;  Location: [facility];  Service: Neurosurgery;  Laterality: N/A;  C5-6, C6-7 ACDF  TIVA setup  NTI    BREAST MASS EXCISION Left 2012    CHOLECYSTECTOMY  1987    open    COLONOSCOPY      HERNIA REPAIR  1987    LAPAROSCOPIC NEPHRECTOMY Left 05/05/2017    Dr. Chucho Au    Left C5-6, C6-7 foraminotomies  06/20/2019    Dr. Tsai     Social History     Tobacco Use    Smoking status: Current Every Day Smoker      Packs/day: 0.50     Years: 45.00     Pack years: 22.50     Types: Cigarettes    Smokeless tobacco: Never Used   Substance Use Topics    Alcohol use: Yes     Alcohol/week: 6.0 standard drinks     Types: 3 Glasses of wine, 3 Cans of beer per week     Family History   Problem Relation Age of Onset    Coronary artery disease Mother     Hypertension Mother     Stroke Mother     Hyperlipidemia Mother     Cancer Father     Coronary artery disease Father        Vitals:    08/22/22 0859   BP: 136/76   Pulse: 78   Resp: 16       PE:  General:  Pleasant. Well-nourished. Well-groomed. Alert. Oriented. No acute distress.    Head:  Normocephalic, without obvious abnormality, atraumatic    Lungs:   Breathing is quiet, non-lablored    Neurological:    Oriented to Person, Place, Time   Speech:  normal  Memory, cognition, and affect are appropriate.  Motor Strength: Moves all extremities spontaneously with good tone.  No abnormal movements seen.    Cervical incision:  C/D/I  Wound edges well-approximated  Glue removed  healing well    ROM:   Decreased secondary to pain    Gait:  normal      IMAGING:    XRays: Cervical Xrs from 8/22/2022 reveal satisfactory position of hardware and stable alignment    ASSESSMENT/PLAN:   Problem List Items Addressed This Visit    None       Visit Diagnoses       Cervical spondylosis with radiculopathy    -  Primary    Relevant Orders    X-Ray Cervical Spine 2 or 3 Views    Vitamin D           PLAN  Patient will return to the office at 3 months post-op with Xrs and vitamin D level prior to appointment. She will call with any issues in the meantime.            EMANUEL Bergeron

## 2022-08-23 NOTE — TELEPHONE ENCOUNTER
I sent in Norco 10 qid prn, #40.  
I spoke with the patient.  She is taking 4/day of the Norco.  She takes it every four hours during the day.  She was last given Norco 7.5-325mg 1 po q4h prn #42, 7 day supply on 8/3.  She would be due yesterday.  She has one left for tonight.  She is s/p C5-6, C6-7 ACDF 8/2/22.  She complains of pain in the neck, right shoulder, and shoulder blade.  She says the pain in the right upper arm has improved with the steroids.  She is about 1/2 way through the pack.  She does feel it is helping.  She rates her pain currently at 7/10.  She says the swelling in her neck has gone down and her swallowing has improved.  She is eating more solids now.  She has increased her gabapentin to 300mg three times a day.  She continues to take Robaxin 500mg, 2 tid as well.  She was taking Tylenol during the night when she wakes with pain, but stopped because it does not help.  I told her it was okay to take the Norco if she wakes at night with pain.  She said she prefers to just take it during the day.  She is alternating ice/heat for the right trapezius/interscapular pain.  I suggested she try taking 2 of the gabapentin at bedtime to see if it helps with the pain that wakes her.  If she feels too groggy the next morning, she will go back to one.  She is scheduled to follow up 8/22 with Fannie.  I checked Rx history, nothing from anyone else since surgery.  Pharmacy is [facility] in [place].  I put in a 10 day supply to get her to her appt 8/22, so may need to adjust before sending.  
no

## 2022-08-30 DIAGNOSIS — G89.18 ACUTE POST-OPERATIVE PAIN: ICD-10-CM

## 2022-08-30 DIAGNOSIS — M47.12 CERVICAL SPONDYLOSIS WITH MYELOPATHY: ICD-10-CM

## 2022-08-30 RX ORDER — HYDROCODONE BITARTRATE AND ACETAMINOPHEN 10; 325 MG/1; MG/1
1 TABLET ORAL EVERY 8 HOURS PRN
Qty: 21 TABLET | Refills: 0 | Status: SHIPPED | OUTPATIENT
Start: 2022-08-30 | End: 2022-09-06

## 2022-08-30 NOTE — TELEPHONE ENCOUNTER
Let her know she needs to taper down on the medication.  Next refill, we will go down to [place] 7.5 tid.  Ask her about the smoking.  Has she stopped?  She needs to not smoke with the fusion.  We can make referral to tobacco cessation clinic if she is still smoking.

## 2022-08-30 NOTE — TELEPHONE ENCOUNTER
"Patient is s/p C5-6, C6-7 ACDF on 8/2/22.  She is requesting a refill on [place] 10/325. She was last given [place] on 8/22, 1 every 8 hours prn, 7 day supply and ran out yesterday, she would be due today. She is taking the Norco as prescribed and is also taking gabapentin 300 mg (2) tid and Robaxin 500 mg (2) tid. She c/o of constant, sharp pain to posterior neck, especially when she moves her neck from side to side. Pain between the shoulder blades is much better. The meds do help. She also has pain to "deep inside throat" that she finds hard to describe. It does not feel like a sore throat, and she denies trouble swallowing. About a week ago, she started loosing her voice and is very hoarse and this has worsened over the last few days, and is a bit concerned. I checked the pharmacy web site, no other meds from anyone. Pharmacy is [facility] in [place].   "

## 2022-08-30 NOTE — TELEPHONE ENCOUNTER
Spoke with patient to let her know med was sent in and that we will start tapering down next refill. She said she has not had a cigarette in 5 days, but uses a low nicotine vape to help her quit. I reiterated the risks of smoking with a fusion.  She already signed up for the tobacco cessation clinic & was contacted by them. She said she will go once she gets a ride, as she does not drive. I stressed the importance of following up with the clinic. She voiced understanding.

## 2022-09-07 ENCOUNTER — TELEPHONE (OUTPATIENT)
Dept: NEUROSURGERY | Facility: CLINIC | Age: 62
End: 2022-09-07
Payer: COMMERCIAL

## 2022-09-07 DIAGNOSIS — G89.18 ACUTE POST-OPERATIVE PAIN: ICD-10-CM

## 2022-09-07 DIAGNOSIS — M47.22 CERVICAL SPONDYLOSIS WITH RADICULOPATHY: Primary | ICD-10-CM

## 2022-09-07 RX ORDER — GABAPENTIN 300 MG/1
CAPSULE ORAL
Qty: 120 CAPSULE | Refills: 2 | Status: SHIPPED | OUTPATIENT
Start: 2022-09-07

## 2022-09-28 ENCOUNTER — LAB VISIT (OUTPATIENT)
Dept: LAB | Facility: HOSPITAL | Age: 62
End: 2022-09-28
Attending: INTERNAL MEDICINE
Payer: COMMERCIAL

## 2022-09-28 DIAGNOSIS — I65.23 BILATERAL CAROTID ARTERY OCCLUSION: ICD-10-CM

## 2022-09-28 DIAGNOSIS — R06.09 PLATYPNEA-ORTHODEOXIA SYNDROME: Primary | ICD-10-CM

## 2022-09-28 DIAGNOSIS — I10 HYPERTENSION, UNSPECIFIED TYPE: ICD-10-CM

## 2022-09-28 DIAGNOSIS — I25.10 CORONARY ATHEROSCLEROSIS OF NATIVE CORONARY ARTERY: ICD-10-CM

## 2022-09-28 DIAGNOSIS — I27.21 PULMONARY ARTERY HYPERTENSION: ICD-10-CM

## 2022-09-28 DIAGNOSIS — I34.0 MITRAL VALVE INSUFFICIENCY, UNSPECIFIED ETIOLOGY: ICD-10-CM

## 2022-09-28 DIAGNOSIS — F17.210 CIGARETTE SMOKER: ICD-10-CM

## 2022-09-28 DIAGNOSIS — I51.7 CARDIOMEGALY: ICD-10-CM

## 2022-09-28 DIAGNOSIS — I36.1 TRICUSPID VALVE INSUFFICIENCY, NON-RHEUMATIC: ICD-10-CM

## 2022-09-28 DIAGNOSIS — E78.2 MIXED HYPERLIPIDEMIA: ICD-10-CM

## 2022-09-28 DIAGNOSIS — I35.0 NODULAR CALCIFIC AORTIC VALVE STENOSIS: ICD-10-CM

## 2022-09-28 DIAGNOSIS — M47.22 CERVICAL SPONDYLOSIS WITH RADICULOPATHY: ICD-10-CM

## 2022-09-28 DIAGNOSIS — Z86.718 PERSONAL HISTORY OF VENOUS THROMBOSIS AND EMBOLISM: ICD-10-CM

## 2022-09-28 LAB
ALBUMIN SERPL-MCNC: 3.7 GM/DL (ref 3.4–4.8)
ALBUMIN/GLOB SERPL: 1.2 RATIO (ref 1.1–2)
ALP SERPL-CCNC: 83 UNIT/L (ref 40–150)
ALT SERPL-CCNC: 19 UNIT/L (ref 0–55)
AST SERPL-CCNC: 15 UNIT/L (ref 5–34)
BILIRUBIN DIRECT+TOT PNL SERPL-MCNC: 0.4 MG/DL
BUN SERPL-MCNC: 13 MG/DL (ref 9.8–20.1)
CALCIUM SERPL-MCNC: 9.9 MG/DL (ref 8.4–10.2)
CHLORIDE SERPL-SCNC: 101 MMOL/L (ref 98–107)
CHOLEST SERPL-MCNC: 198 MG/DL
CHOLEST/HDLC SERPL: 3 {RATIO} (ref 0–5)
CO2 SERPL-SCNC: 30 MMOL/L (ref 23–31)
CREAT SERPL-MCNC: 0.75 MG/DL (ref 0.55–1.02)
DEPRECATED CALCIDIOL+CALCIFEROL SERPL-MC: 48.8 NG/ML (ref 30–80)
GFR SERPLBLD CREATININE-BSD FMLA CKD-EPI: >60 MLS/MIN/1.73/M2
GLOBULIN SER-MCNC: 3.2 GM/DL (ref 2.4–3.5)
GLUCOSE SERPL-MCNC: 113 MG/DL (ref 82–115)
HDLC SERPL-MCNC: 72 MG/DL (ref 35–60)
LDLC SERPL CALC-MCNC: 103 MG/DL (ref 50–140)
POTASSIUM SERPL-SCNC: 3.7 MMOL/L (ref 3.5–5.1)
PROT SERPL-MCNC: 6.9 GM/DL (ref 5.8–7.6)
SODIUM SERPL-SCNC: 141 MMOL/L (ref 136–145)
TRIGL SERPL-MCNC: 114 MG/DL (ref 37–140)
VLDLC SERPL CALC-MCNC: 23 MG/DL

## 2022-09-28 PROCEDURE — 80061 LIPID PANEL: CPT

## 2022-09-28 PROCEDURE — 82306 VITAMIN D 25 HYDROXY: CPT

## 2022-09-28 PROCEDURE — 36415 COLL VENOUS BLD VENIPUNCTURE: CPT

## 2022-09-28 PROCEDURE — 80053 COMPREHEN METABOLIC PANEL: CPT

## 2022-10-10 ENCOUNTER — HOSPITAL ENCOUNTER (EMERGENCY)
Facility: HOSPITAL | Age: 62
Discharge: HOME | End: 2022-10-10
Attending: EMERGENCY MEDICINE
Payer: COMMERCIAL

## 2022-10-10 VITALS
WEIGHT: 143 LBS | DIASTOLIC BLOOD PRESSURE: 67 MMHG | OXYGEN SATURATION: 96 % | HEIGHT: 65 IN | HEART RATE: 76 BPM | RESPIRATION RATE: 16 BRPM | SYSTOLIC BLOOD PRESSURE: 114 MMHG | TEMPERATURE: 98 F | BODY MASS INDEX: 23.82 KG/M2

## 2022-10-10 DIAGNOSIS — R29.898 TRANSIENT WEAKNESS OF LEFT LEG: Primary | ICD-10-CM

## 2022-10-10 DIAGNOSIS — I63.9 STROKE: ICD-10-CM

## 2022-10-10 LAB
ALBUMIN SERPL-MCNC: 4 GM/DL (ref 3.4–4.8)
ALBUMIN/GLOB SERPL: 1.2 RATIO (ref 1.1–2)
ALP SERPL-CCNC: 82 UNIT/L (ref 40–150)
ALT SERPL-CCNC: 20 UNIT/L (ref 0–55)
APPEARANCE UR: CLEAR
AST SERPL-CCNC: 26 UNIT/L (ref 5–34)
BASOPHILS # BLD AUTO: 0.04 X10(3)/MCL (ref 0–0.2)
BASOPHILS NFR BLD AUTO: 0.5 %
BILIRUB UR QL STRIP.AUTO: NEGATIVE MG/DL
BILIRUBIN DIRECT+TOT PNL SERPL-MCNC: 0.5 MG/DL
BUN SERPL-MCNC: 14 MG/DL (ref 9.8–20.1)
CALCIUM SERPL-MCNC: 10.5 MG/DL (ref 8.4–10.2)
CHLORIDE SERPL-SCNC: 100 MMOL/L (ref 98–107)
CHOLEST SERPL-MCNC: 197 MG/DL
CHOLEST/HDLC SERPL: 2 {RATIO} (ref 0–5)
CO2 SERPL-SCNC: 28 MMOL/L (ref 23–31)
COLOR UR AUTO: YELLOW
CREAT SERPL-MCNC: 0.76 MG/DL (ref 0.55–1.02)
EOSINOPHIL # BLD AUTO: 0.19 X10(3)/MCL (ref 0–0.9)
EOSINOPHIL NFR BLD AUTO: 2.2 %
ERYTHROCYTE [DISTWIDTH] IN BLOOD BY AUTOMATED COUNT: 12.2 % (ref 11.5–17)
GFR SERPLBLD CREATININE-BSD FMLA CKD-EPI: >60 MLS/MIN/1.73/M2
GLOBULIN SER-MCNC: 3.3 GM/DL (ref 2.4–3.5)
GLUCOSE SERPL-MCNC: 102 MG/DL (ref 82–115)
GLUCOSE UR QL STRIP.AUTO: NEGATIVE MG/DL
HCT VFR BLD AUTO: 45.9 % (ref 37–47)
HDLC SERPL-MCNC: 85 MG/DL (ref 35–60)
HGB BLD-MCNC: 15.7 GM/DL (ref 12–16)
IMM GRANULOCYTES # BLD AUTO: 0.03 X10(3)/MCL (ref 0–0.04)
IMM GRANULOCYTES NFR BLD AUTO: 0.4 %
INR BLD: 0.8 (ref 0–1.3)
KETONES UR QL STRIP.AUTO: NEGATIVE MG/DL
LDLC SERPL CALC-MCNC: 93 MG/DL (ref 50–140)
LEUKOCYTE ESTERASE UR QL STRIP.AUTO: NEGATIVE UNIT/L
LYMPHOCYTES # BLD AUTO: 2.19 X10(3)/MCL (ref 0.6–4.6)
LYMPHOCYTES NFR BLD AUTO: 25.7 %
MCH RBC QN AUTO: 31.5 PG (ref 27–31)
MCHC RBC AUTO-ENTMCNC: 34.2 MG/DL (ref 33–36)
MCV RBC AUTO: 92.2 FL (ref 80–94)
MONOCYTES # BLD AUTO: 0.54 X10(3)/MCL (ref 0.1–1.3)
MONOCYTES NFR BLD AUTO: 6.3 %
NEUTROPHILS # BLD AUTO: 5.5 X10(3)/MCL (ref 2.1–9.2)
NEUTROPHILS NFR BLD AUTO: 64.9 %
NITRITE UR QL STRIP.AUTO: NEGATIVE
PH UR STRIP.AUTO: 7 [PH]
PLATELET # BLD AUTO: 216 X10(3)/MCL (ref 130–400)
PMV BLD AUTO: 9.9 FL (ref 7.4–10.4)
POCT GLUCOSE: 104 MG/DL (ref 70–110)
POTASSIUM SERPL-SCNC: 4.7 MMOL/L (ref 3.5–5.1)
PROT SERPL-MCNC: 7.3 GM/DL (ref 5.8–7.6)
PROT UR QL STRIP.AUTO: NEGATIVE MG/DL
PROTHROMBIN TIME: 11 SECONDS (ref 12.5–14.5)
RBC # BLD AUTO: 4.98 X10(6)/MCL (ref 4.2–5.4)
RBC UR QL AUTO: NEGATIVE UNIT/L
SODIUM SERPL-SCNC: 137 MMOL/L (ref 136–145)
SP GR UR STRIP.AUTO: 1.01
TRIGL SERPL-MCNC: 94 MG/DL (ref 37–140)
TSH SERPL-ACNC: 0.9 UIU/ML (ref 0.35–4.94)
UROBILINOGEN UR STRIP-ACNC: 0.2 MG/DL
VLDLC SERPL CALC-MCNC: 19 MG/DL
WBC # SPEC AUTO: 8.5 X10(3)/MCL (ref 4.5–11.5)

## 2022-10-10 PROCEDURE — 85025 COMPLETE CBC W/AUTO DIFF WBC: CPT | Performed by: EMERGENCY MEDICINE

## 2022-10-10 PROCEDURE — 85610 PROTHROMBIN TIME: CPT | Performed by: EMERGENCY MEDICINE

## 2022-10-10 PROCEDURE — 84443 ASSAY THYROID STIM HORMONE: CPT | Performed by: EMERGENCY MEDICINE

## 2022-10-10 PROCEDURE — 93010 ELECTROCARDIOGRAM REPORT: CPT | Mod: ,,, | Performed by: INTERNAL MEDICINE

## 2022-10-10 PROCEDURE — 80053 COMPREHEN METABOLIC PANEL: CPT | Performed by: EMERGENCY MEDICINE

## 2022-10-10 PROCEDURE — 99285 EMERGENCY DEPT VISIT HI MDM: CPT | Mod: 25

## 2022-10-10 PROCEDURE — 93010 EKG 12-LEAD: ICD-10-PCS | Mod: ,,, | Performed by: INTERNAL MEDICINE

## 2022-10-10 PROCEDURE — 93005 ELECTROCARDIOGRAM TRACING: CPT

## 2022-10-10 PROCEDURE — 81003 URINALYSIS AUTO W/O SCOPE: CPT | Performed by: EMERGENCY MEDICINE

## 2022-10-10 PROCEDURE — 36415 COLL VENOUS BLD VENIPUNCTURE: CPT | Performed by: EMERGENCY MEDICINE

## 2022-10-10 PROCEDURE — 80061 LIPID PANEL: CPT | Performed by: EMERGENCY MEDICINE

## 2022-10-10 PROCEDURE — 82962 GLUCOSE BLOOD TEST: CPT

## 2022-10-10 NOTE — ED PROVIDER NOTES
Encounter Date: 10/10/2022       History     Chief Complaint   Patient presents with    weakness in legs     Reports waking up at 5 this am and feels like her legs are weak and off of coordination     The history is provided by the patient. No  was used.   Cerebrovascular Accident  The primary symptoms include dizziness. Primary symptoms do not include fever or nausea. The symptoms began 3 to 5 hours ago. The episode lasted 5 hours. The symptoms are unchanged. The neurological symptoms are diffuse.   She describes the dizziness as imbalance. The dizziness began today. The dizziness has been unchanged since its onset. Dizziness does not occur with nausea or weakness.   Additional symptoms include loss of balance. Additional symptoms do not include weakness. Medical issues also include recent surgery (8 weeks s/p cervical fusion).   Review of patient's allergies indicates:   Allergen Reactions    Codeine Nausea And Vomiting     Other reaction(s): Vomiting (disorder)       Past Medical History:   Diagnosis Date    Anxiety     Dyslipidemia     Granuloma annulare     History of DVT (deep vein thrombosis) 2015    History of renal cell carcinoma     HTN (hypertension)     IBS (irritable bowel syndrome)     Left arm pain     Left arm weakness     Left shoulder pain     Migraine     Mild intermittent asthma, uncomplicated     Neck pain     Neuropathy, arm, left     OA (osteoarthritis)     Type 2 diabetes mellitus without complications      Past Surgical History:   Procedure Laterality Date    ANTERIOR CERVICAL DISCECTOMY W/ FUSION N/A 8/2/2022    Procedure: DISCECTOMY, SPINE, CERVICAL, ANTERIOR APPROACH, WITH FUSION;  Surgeon: Venkat Tsai MD;  Location: [facility];  Service: Neurosurgery;  Laterality: N/A;  C5-6, C6-7 ACDF  TIVA setup  NTI    BREAST MASS EXCISION Left 2012    CHOLECYSTECTOMY  1987    open    COLONOSCOPY      HERNIA REPAIR  1987    LAPAROSCOPIC NEPHRECTOMY Left 05/05/2017    Dr. Rankin  [place]    Left C5-6, C6-7 foraminotomies  06/20/2019    Dr. Tsai     Family History   Problem Relation Age of Onset    Coronary artery disease Mother     Hypertension Mother     Stroke Mother     Hyperlipidemia Mother     Cancer Father     Coronary artery disease Father      Social History     Tobacco Use    Smoking status: Every Day     Packs/day: 0.50     Years: 45.00     Pack years: 22.50     Types: Cigarettes    Smokeless tobacco: Never   Substance Use Topics    Alcohol use: Yes     Alcohol/week: 6.0 standard drinks     Types: 3 Glasses of wine, 3 Cans of beer per week     Review of Systems   Constitutional:  Negative for fever.   HENT:  Negative for sore throat.    Respiratory:  Negative for shortness of breath.    Cardiovascular:  Negative for chest pain.   Gastrointestinal:  Negative for nausea.   Genitourinary:  Negative for dysuria.   Musculoskeletal:  Negative for back pain.   Skin:  Negative for rash.   Neurological:  Positive for dizziness and loss of balance. Negative for weakness.   Hematological:  Does not bruise/bleed easily.     Physical Exam     Initial Vitals [10/10/22 0922]   BP Pulse Resp Temp SpO2   137/79 93 16 97.9 °F (36.6 °C) 98 %      MAP       --         Physical Exam    Nursing note and vitals reviewed.  Constitutional: She appears well-developed and well-nourished.   HENT:   Head: Normocephalic and atraumatic.   Right Ear: External ear normal.   Left Ear: External ear normal.   Eyes: Conjunctivae and EOM are normal. Pupils are equal, round, and reactive to light.   Neck: Neck supple.   Normal range of motion.  Cardiovascular:  Normal rate, regular rhythm, normal heart sounds and intact distal pulses.           Pulmonary/Chest: Breath sounds normal.   Abdominal: Abdomen is soft. Bowel sounds are normal.   Musculoskeletal:         General: Normal range of motion.      Cervical back: Normal range of motion and neck supple.     Neurological: She is alert and oriented to person, place,  and time. GCS score is 15. GCS eye subscore is 4. GCS verbal subscore is 5. GCS motor subscore is 6.   Slightly weaker in LLE compared to right; upper extremity strength is 5/5 bilaterally   Skin: Skin is warm and dry. Capillary refill takes less than 2 seconds.   Psychiatric: She has a normal mood and affect. Her behavior is normal. Judgment and thought content normal.       ED Course   Procedures  Labs Reviewed   COMPREHENSIVE METABOLIC PANEL - Abnormal; Notable for the following components:       Result Value    Calcium Level Total 10.5 (*)     All other components within normal limits   PROTIME-INR - Abnormal; Notable for the following components:    PT 11.0 (*)     All other components within normal limits   LIPID PANEL - Abnormal; Notable for the following components:    HDL Cholesterol 85 (*)     All other components within normal limits   CBC WITH DIFFERENTIAL - Abnormal; Notable for the following components:    MCH 31.5 (*)     All other components within normal limits   TSH - Normal   CBC W/ AUTO DIFFERENTIAL    Narrative:     The following orders were created for panel order CBC W/ AUTO DIFFERENTIAL.  Procedure                               Abnormality         Status                     ---------                               -----------         ------                     CBC with Differential[[id number]]        Abnormal            Final result                 Please view results for these tests on the individual orders.   URINALYSIS, REFLEX TO URINE CULTURE   POCT GLUCOSE, HAND-HELD DEVICE   POCT GLUCOSE     EKG Readings: (Independently Interpreted)   Initial Reading: No STEMI. Rhythm: Normal Sinus Rhythm. Heart Rate: 82. Ectopy: No Ectopy. Conduction: Normal. ST Segments: Normal ST Segments. T Waves: Normal. Axis: Normal. Clinical Impression: Normal Sinus Rhythm   ECG Results              ECG 12 lead (In process)  Result time 10/10/22 11:34:27      In process by Interface, Lab In [facility] (10/10/22  11:34:27)                   Narrative:    Test Reason : I63.9,    Vent. Rate : 082 BPM     Atrial Rate : 082 BPM     P-R Int : 158 ms          QRS Dur : 090 ms      QT Int : 376 ms       P-R-T Axes : 070 081 057 degrees     QTc Int : 439 ms    Normal sinus rhythm  Normal ECG  When compared with ECG of 01-AUG-2022 11:15,  No significant change was found    Referred By: [email or web address]   SELF           Confirmed By:                                   Imaging Results              CT Head Without Contrast (Final result)  Result time 10/10/22 09:42:29      Final result by Darin Zamora MD (10/10/22 09:42:29)                   Impression:      1. No acute intracranial abnormality identified.      Electronically signed by: Darin Zamora  Date:    10/10/2022  Time:    09:42               Narrative:    EXAMINATION:  CT HEAD WITHOUT CONTRAST    CLINICAL HISTORY:  Neuro deficit, acute, stroke suspected;, .    TECHNIQUE:  PATIENT RADIATION DOSE: DLP(mGycm) 823    As per PQRS measures, all CT scans at this facility used dose modulation, iterative reconstruction, and/or weight based dose adjustment when appropriate to reduce radiation dose to as low as reasonably achievable.    COMPARISON:  None available.    FINDINGS:  Serial axial images were obtained of the head without the administration of IV contrast. Both brain and bone parenchymal windows were obtained.  Additional coronal and sagittal reconstructions were obtained.  Ventricles, cisterns, and sulci are within normal limits.  There is no evidence of intracranial hemorrhage, midline shift, mass effect, or abnormal extra-axial fluid collections.  Mild scattered hypodensities seen within the periventricular white matter suspicious for small vessel ischemic changes.  Minimal intracranial atherosclerosis is seen.  Visualized portions the paranasal sinuses and mastoid air cells are relatively clear.  Cerebellar tonsils extend caudally to the level of the foramen magnum.             "                           Medications - No data to display  Medical Decision Making:   Initial Assessment:   61 y/o female presents "feeling drunk"; describes being off balance and that  "my legs won't do what I tell them to do"; minimal LLE weakness, but 5/5 throughout in other 3 extremities  Differential Diagnosis:   CVA, seizure, electrolyte disturbance, lumbar disease, myopathy, other  Clinical Tests:   Lab Tests: Ordered and Reviewed  Radiological Study: Ordered and Reviewed  Medical Tests: Ordered and Reviewed                 1315:  pt feeling better; strength 5/5 in BLE now; ? Lumbar issue, but doubt CVA/TIA;  pt wishes to F/U with her PCP if symptoms persist, but ready to go home now       Clinical Impression:   Final diagnoses:  [I63.9] Stroke  [R29.898] Transient weakness of left leg (Primary)        ED Disposition Condition    Discharge Stable          ED Prescriptions    None       Follow-up Information       Follow up With Specialties Details Why Contact Info    Vinayak Partida MD Family Medicine Schedule an appointment as soon as possible for a visit in 1 week  [address] [place] [id number]  [phone]               George Calloway MD  10/10/22 1314    "

## 2022-11-09 ENCOUNTER — HOSPITAL ENCOUNTER (OUTPATIENT)
Dept: RADIOLOGY | Facility: HOSPITAL | Age: 62
Discharge: HOME | End: 2022-11-09
Attending: NURSE PRACTITIONER
Payer: COMMERCIAL

## 2022-11-09 ENCOUNTER — OFFICE VISIT (OUTPATIENT)
Dept: NEUROSURGERY | Facility: CLINIC | Age: 62
End: 2022-11-09
Payer: COMMERCIAL

## 2022-11-09 VITALS
DIASTOLIC BLOOD PRESSURE: 72 MMHG | HEART RATE: 78 BPM | HEIGHT: 65 IN | RESPIRATION RATE: 16 BRPM | BODY MASS INDEX: 24.66 KG/M2 | SYSTOLIC BLOOD PRESSURE: 130 MMHG | WEIGHT: 148 LBS

## 2022-11-09 DIAGNOSIS — M81.0 AGE-RELATED OSTEOPOROSIS WITHOUT CURRENT PATHOLOGICAL FRACTURE: ICD-10-CM

## 2022-11-09 DIAGNOSIS — Z98.1 STATUS POST CERVICAL SPINAL FUSION: ICD-10-CM

## 2022-11-09 DIAGNOSIS — M47.22 CERVICAL SPONDYLOSIS WITH RADICULOPATHY: Primary | ICD-10-CM

## 2022-11-09 DIAGNOSIS — M47.22 CERVICAL SPONDYLOSIS WITH RADICULOPATHY: ICD-10-CM

## 2022-11-09 DIAGNOSIS — Z72.0 TOBACCO USE: ICD-10-CM

## 2022-11-09 PROCEDURE — 1159F MED LIST DOCD IN RCRD: CPT | Mod: CPTII,,, | Performed by: NEUROLOGICAL SURGERY

## 2022-11-09 PROCEDURE — 1159F PR MEDICATION LIST DOCUMENTED IN MEDICAL RECORD: ICD-10-PCS | Mod: CPTII,,, | Performed by: NEUROLOGICAL SURGERY

## 2022-11-09 PROCEDURE — 3075F SYST BP GE 130 - 139MM HG: CPT | Mod: CPTII,,, | Performed by: NEUROLOGICAL SURGERY

## 2022-11-09 PROCEDURE — 1160F PR REVIEW ALL MEDS BY PRESCRIBER/CLIN PHARMACIST DOCUMENTED: ICD-10-PCS | Mod: CPTII,,, | Performed by: NEUROLOGICAL SURGERY

## 2022-11-09 PROCEDURE — 3078F DIAST BP <80 MM HG: CPT | Mod: CPTII,,, | Performed by: NEUROLOGICAL SURGERY

## 2022-11-09 PROCEDURE — 99212 OFFICE O/P EST SF 10 MIN: CPT | Mod: ,,, | Performed by: NEUROLOGICAL SURGERY

## 2022-11-09 PROCEDURE — 3075F PR MOST RECENT SYSTOLIC BLOOD PRESS GE 130-139MM HG: ICD-10-PCS | Mod: CPTII,,, | Performed by: NEUROLOGICAL SURGERY

## 2022-11-09 PROCEDURE — 3008F PR BODY MASS INDEX (BMI) DOCUMENTED: ICD-10-PCS | Mod: CPTII,,, | Performed by: NEUROLOGICAL SURGERY

## 2022-11-09 PROCEDURE — 3078F PR MOST RECENT DIASTOLIC BLOOD PRESSURE < 80 MM HG: ICD-10-PCS | Mod: CPTII,,, | Performed by: NEUROLOGICAL SURGERY

## 2022-11-09 PROCEDURE — 1160F RVW MEDS BY RX/DR IN RCRD: CPT | Mod: CPTII,,, | Performed by: NEUROLOGICAL SURGERY

## 2022-11-09 PROCEDURE — 99212 PR OFFICE/OUTPT VISIT, EST, LEVL II, 10-19 MIN: ICD-10-PCS | Mod: ,,, | Performed by: NEUROLOGICAL SURGERY

## 2022-11-09 PROCEDURE — 3008F BODY MASS INDEX DOCD: CPT | Mod: CPTII,,, | Performed by: NEUROLOGICAL SURGERY

## 2022-11-09 PROCEDURE — 72040 X-RAY EXAM NECK SPINE 2-3 VW: CPT | Mod: TC

## 2022-11-09 RX ORDER — METHOCARBAMOL 500 MG/1
500-1000 TABLET, FILM COATED ORAL 3 TIMES DAILY PRN
Qty: 40 TABLET | Refills: 2 | Status: SHIPPED | OUTPATIENT
Start: 2022-11-09 | End: 2023-08-10 | Stop reason: SDUPTHER

## 2022-11-09 RX ORDER — UPADACITINIB 15 MG/1
15 TABLET, EXTENDED RELEASE ORAL DAILY
COMMUNITY
Start: 2022-10-21 | End: 2023-02-08

## 2022-11-09 NOTE — PROGRESS NOTES
[facility]  Neurosurgery        Dayana Mejia   [id number]   1960         CHIEF COMPLAINT:    3 months post-op    HPI:    Dayana Mejia is a 62 y.o.-year-old female who presents today for post-operative follow-up.  She is s/p C5-6, C6-7 ACDF that was done on 8/2/22.  She has continued to improve since surgery.  She complains of pain in the left proximal upper arm.  She says it is not severe.  It increases with use of her arm, but she says it does not limit her.  She has a numb area under her chin.  She says it has gradually decreased in size since the surgery.  Her chief complaint is soreness in the posterior neck and upper trapezius region.  She says it feels like she constantly needs a massage.  She says it is gradually improving.  She continues with hoarseness that is gradually improving as well.  She had a scope with Dr. Evans in [place] and reports her vocal cords were moving appropriately.  She continues to smoke.  She is not interested in going to the smoking cessation clinic.        Review of patient's allergies indicates:   Allergen Reactions    Codeine Nausea And Vomiting     Other reaction(s): Vomiting (disorder)         Current Outpatient Medications   Medication Sig Dispense Refill    AMITIZA 24 mcg Cap Take 24 mcg by mouth daily as needed.      ascorbic acid, vitamin C, (VITAMIN C) 1000 MG tablet Take 1,000 mg by mouth once daily.      azelastine (ASTELIN) 137 mcg (0.1 %) nasal spray 1 spray 2 (two) times daily.      biotin 10,000 mcg Cap Take by mouth 2 (two) times a day.      cetirizine (ZYRTEC) 10 MG tablet Take 10 mg by mouth once daily.      gabapentin (NEURONTIN) 300 MG capsule 1 capsule by mouth in the morning and in the afternoon, 2 capsules by mouth at bedtime 120 capsule 2    metFORMIN (GLUCOPHAGE) 500 MG tablet 2 (two) times a day.      NON FORMULARY MEDICATION Take 1 tablet by mouth 2 (two) times a day. Calcium 500+ Vit D and Magnesium      raloxifene (EVISTA) 60 mg tablet  Take 60 mg by mouth once daily.      RINVOQ 15 mg 24 hr tablet Take 15 mg by mouth once daily.      rosuvastatin (CRESTOR) 40 MG Tab Take 40 mg by mouth once daily at 6am.      SYMBICORT 160-4.5 mcg/actuation HFAA Inhale 1 puff into the lungs every 12 (twelve) hours.      temazepam (RESTORIL) 15 mg Cap Take 15 mg by mouth nightly as needed.      triamterene-hydrochlorothiazide 37.5-25 mg (MAXZIDE-25) 37.5-25 mg per tablet Take 1 tablet by mouth once daily.      VASCEPA 1 gram Cap Take 1 capsule by mouth 2 (two) times a day.       No current facility-administered medications for this visit.       Past Medical History:   Diagnosis Date    Anxiety     Asthma     Cervical spondylosis with myelopathy     Cervical spondylosis with radiculopathy     Decreased shoulder mobility, left     DM (diabetes mellitus)     Dyslipidemia     Granuloma annulare     History of DVT (deep vein thrombosis) 2015    History of renal cell carcinoma     HTN (hypertension)     IBS (irritable bowel syndrome)     Left arm pain     Left arm weakness     Left shoulder pain     Migraine     Neck pain     Neuropathy, arm, left     OA (osteoarthritis)     Type 2 diabetes mellitus without complications      Past Surgical History:   Procedure Laterality Date    ANTERIOR CERVICAL DISCECTOMY W/ FUSION N/A 8/2/2022    Procedure: DISCECTOMY, SPINE, CERVICAL, ANTERIOR APPROACH, WITH FUSION;  Surgeon: Venkat Tsai MD;  Location: [facility];  Service: Neurosurgery;  Laterality: N/A;  C5-6, C6-7 ACDF  TIVA setup  NTI    BREAST MASS EXCISION Left 2012    CHOLECYSTECTOMY  1987    open    COLONOSCOPY      HERNIA REPAIR  1987    LAPAROSCOPIC NEPHRECTOMY Left 05/05/2017    Dr. Chucho Au    Left C5-6, C6-7 foraminotomies  06/20/2019    Dr. Tsai     Family History       Problem Relation (Age of Onset)    Cancer Father    Coronary artery disease Mother, Father    Hyperlipidemia Mother    Hypertension Mother    Stroke Mother          Social History     Socioeconomic  "History    Marital status:    Tobacco Use    Smoking status: Every Day     Packs/day: 0.50     Years: 45.00     Pack years: 22.50     Types: Cigarettes    Smokeless tobacco: Never   Substance and Sexual Activity    Alcohol use: Yes     Alcohol/week: 6.0 standard drinks     Types: 3 Glasses of wine, 3 Cans of beer per week       Review of systems:    Pertinent items are noted in HPI.      Vital Signs  Pulse: 78  Resp: 16  BP: 130/72  Pain Score:   4  Height: 5' 5" (165.1 cm)  Weight: 67.1 kg (148 lb)  Body mass index is 24.63 kg/m².      Physical Exam:    General:  Pleasant. Well-nourished. Alert. No acute distress.    Head:  Normocephalic, without obvious abnormality, atraumatic    Lungs:   Breathing is quiet, non-lablored    Neurological:    Oriented to Person, Place, Time   Speech:  normal  Memory, cognition, and affect are appropriate.  Motor Strength: Moves all extremities spontaneously with good tone.  No abnormal movements seen.      Cervical incision:  Well healed    Gait:  normal    Her x-rays show definite bone graft incorporation when compared with her 2 week postop films.  However, given the multilevel nature of her fusion as well as her inability to quit smoking, I really would like her to start using a bone growth stimulator.    ASSESSMENT:     1. Cervical spondylosis with radiculopathy      2. Status post cervical spinal fusion  - Ambulatory referral/consult to Physical/Occupational Therapy; Future  - X-Ray Cervical Spine 2 or 3 Views; Future  - Vitamin D level; Future    3. Tobacco use  - Bone Stimulator for Home Use     -Follow up 6 months post op w/ xray & vit D            I, Dr. Venkat Tsai, personally performed the services described in this documentation. All medical record entries made by the scribe, Nery Aldrich RN, were at my direction and in my presence.  I have reviewed the chart and agree that the record reflects my personal performance and is accurate and complete. Venkat Tsai, " MD.  10:28 AM 11/09/2022           Venkat Tsai MD FACS [facility]

## 2022-11-16 ENCOUNTER — CLINICAL SUPPORT (OUTPATIENT)
Dept: REHABILITATION | Facility: HOSPITAL | Age: 62
End: 2022-11-16
Payer: COMMERCIAL

## 2022-11-16 DIAGNOSIS — M47.22 CERVICAL SPONDYLOSIS WITH RADICULOPATHY: ICD-10-CM

## 2022-11-16 DIAGNOSIS — Z98.1 STATUS POST CERVICAL SPINAL FUSION: ICD-10-CM

## 2022-11-16 DIAGNOSIS — M54.2 NECK PAIN: Primary | ICD-10-CM

## 2022-11-16 PROCEDURE — 97163 PT EVAL HIGH COMPLEX 45 MIN: CPT

## 2022-11-16 PROCEDURE — 97140 MANUAL THERAPY 1/> REGIONS: CPT

## 2022-11-16 NOTE — PROGRESS NOTES
[facility]   Physical Therapy Initial Evaluation     Date: 11/16/2022   Name: Dayana Mejia  Clinic Number: [id number]    Therapy Diagnosis:   Encounter Diagnosis   Name Primary?    Neck pain Yes     Physician: Venkat Tsai MD    Physician Orders: PT Eval and Treat  Medical Diagnosis from Referral: Cervical spondylosis with radiculopathy  Evaluation Date: 11/16/2022  Authorization Period Expiration: Medically Necessary  Plan of Care Expiration: 02/16/2023  Visit # / Visits authorized: 0/ As needed    Time In: 0845  Time Out: 0925  Total Appointment Time (timed & untimed codes): 40 minutes  Total Treatment time (time-based codes) separate from Evaluation: 27 minutes    Surgery:  s/p C5-6, C6-7 ACDF 8/2/22  Orthopedic Precautions: None; cleared per surgeon to resume UE lifting at gym  Pertinent History: See medical hx below    SUBJECTIVE   Dayana reports: She is s/p ADCF and feeling great other than the constant nagging posterior neck discomfort which she doesn't classify as pain. She points to suboccipital region bilaterally and states it radiates down her posterior neck. In 2019 had posterior neck surgery that she states a bone spur that was pressing on a nerve was shaved off. Admits to headaches 2x/week. Other than this, all of her radicular symptoms have improved since surgery, namely UE weakness that she was having prior. Does have numbness in left pointer finger tip and tingling to left middle finger tip, however was told by surgeon that this will take time to improve. History of chronic migraines for years, however last episode was 6 months ago. Takes gabapentin 300mg 3x per day and Robaxin was just restarted 1 week ago per direction of surgeon which she takes once daily. Denies 5 Ds.    She goes to the gym 4 days per week, and was cleared by surgeon to resume UE lifting. Her goal is to eliminate pain in her neck.    Imaging  X-ray cervical spine 11/09/22 - interpreted per Venkat HENDERSON  MD Quin  -Her x-rays show definite bone graft incorporation when compared with her 2 week postop films.  However, given the multilevel nature of her fusion as well as her inability to quit smoking, I really would like her to start using a bone growth stimulator      X-ray cervical spine 11/09/22 - interpreted by Radiologist Sotero Oh MD  XR CERVICAL SPINE 2 OR 3 VIEWS - AP, lateral and open mouth views of the cervical spine were performed.  FINDINGS:  -Examination reveals anterior fusion extending from C5-C7 with plate and screws as well as disc spacers at the C5-C6 and C6-C7 levels  -There are degenerative changes of the uncovertebral joints articular spaces otherwise preserved with smooth articular surfaces no acute fractures or dislocations identified no other significant abnormality as compared with the previous exam August 22, 2022  Impression:  Anterior fusion with no significant change as compared with previous exam      Medical History:   Past Medical History:   Diagnosis Date    Anxiety     Asthma     Cervical spondylosis with myelopathy     Cervical spondylosis with radiculopathy     Decreased shoulder mobility, left     DM (diabetes mellitus)     Dyslipidemia     Granuloma annulare     History of DVT (deep vein thrombosis) 2015    History of renal cell carcinoma     HTN (hypertension)     IBS (irritable bowel syndrome)     Left arm pain     Left arm weakness     Left shoulder pain     Migraine     Neck pain     Neuropathy, arm, left     OA (osteoarthritis)     Type 2 diabetes mellitus without complications        Surgical History:   Dayana Mejia  has a past surgical history that includes Left C5-6, C6-7 foraminotomies (06/20/2019); Laparoscopic nephrectomy (Left, 05/05/2017); Breast mass excision (Left, 2012); Cholecystectomy (1987); Hernia repair (1987); Colonoscopy; and Anterior cervical discectomy w/ fusion (N/A, 8/2/2022).    Medications:   Dayana has a current medication list which includes  the following prescription(s): amitiza, ascorbic acid (vitamin c), azelastine, biotin, cetirizine, gabapentin, metformin, methocarbamol, NON FORMULARY MEDICATION, raloxifene, rinvoq, rosuvastatin, symbicort, temazepam, triamterene-hydrochlorothiazide 37.5-25 mg, and vascepa.    Allergies:   Review of patient's allergies indicates:   Allergen Reactions    Codeine Nausea And Vomiting     Other reaction(s): Vomiting (disorder)            CMS Impairment/Limitation/Restriction for FOTO Survey  Therapist reviewed FOTO scores for Dyaana Mejia on 2022.   FOTO documents entered into [employer] - see Media section.    Eval Patient's Physical FS Primary Measure: 59  Eval Risk Adjusted Statistical FOTO: 44  Eval Limitation Score: 41%  Category: Carrying  Eval NDI: 23.3% disability    OBJECTIVE       Posture    Mild forward head posture, elevated shoulders     Palpation    Tender to mild/moderate palpatory pressure suboccipitals (left medial most painful) extending down to medial posterior neck. Mild tenderness to periscapular area bilaterally. No tenderness bilateral UTs, levator, RTC     Dermatomes  Intact to light touch bilaterally UEs     Myotomes    5/5 shoulders bilaterally ER, IR, abd, flx, add, elbow flx/ext     AROM      Rotation limited bilaterally ~25%, lateral flexion limited bilaterally ~50%, flexion/extension WFL    Pain reproduced posterior neck with all above motions (extension and flexion more painful than the rest)             TREATMENT     Dayana received the treatments listed below:      Informed consent obtained after thorough explanation of risks and benefits. Signed consent form will be scanned into medical record. Pre-procedure time out performed which included verification of name, , and site of treatment. 70% isopropyl alcohol wipe down performed to treatment site with single use sterile prep pads.     Time Activities   Manual 27 min TDN done to suboccipitals Seirin 0.30 x 30 needles. 4 needles place (2  right, 3 left), done with e-stim using ES-130 at 3 Hz frequency on moderate setting and 3 intensity for 10 minutes.      Suboccipital release, STM to posterior neck, Biofreeze to posterior neck     TherAct     TherEx     Gait     Neuro Re-ed     Modalities     E-Stim     Dry Needling     Canalith Repositioning         Home Exercises and Patient Education Provided    Education provided:   -Plan of care, HEP (chin tucks to pain-free range)    Home Exercises Provided: yes.  Exercises were reviewed and Dayana was able to demonstrate them prior to the end of the session.  Dayana demonstrated good  understanding of the education provided.     ASSESSMENT     Dayana is a 62 y.o. female referred to outpatient Physical Therapy with a medical diagnosis of Cervical spondylosis with radiculopathy. Patient presents with neck pain and stiffness. Patient will benefit from skilled outpatient Physical Therapy to address the deficits stated above and in the chart below, provide education, and to maximize patient's level of independence.     Patient prognosis is Good.     Plan of care discussed with patient: Yes  Patient's spiritual, cultural and educational needs considered and patient is agreeable to the plan of care and goals as stated below:     Anticipated Barriers for therapy: None    Goals:  Long Term Goals: 12 weeks   Patient will report at least 20% disability reduction from initial NDI score to indicate clinically significant functional improvement  Patient will report at least 20 point increase on initial FOTO score to indicate clinically significant functional improvement      PLAN   Plan of care Certification: 11/16/2022 to 02/16/2023.    Outpatient Physical Therapy 2-3 times weekly for 12 weeks to include the following interventions: Cervical/Lumbar Traction, Manual Therapy, Moist Heat/ Ice, Neuromuscular Re-ed, Patient Education, Self Care, Therapeutic Activities, and Therapeutic Exercise.     Angelica Thomas, PT

## 2022-11-18 ENCOUNTER — CLINICAL SUPPORT (OUTPATIENT)
Dept: REHABILITATION | Facility: HOSPITAL | Age: 62
End: 2022-11-18
Payer: COMMERCIAL

## 2022-11-18 DIAGNOSIS — M54.2 NECK PAIN: Primary | ICD-10-CM

## 2022-11-18 PROCEDURE — 97140 MANUAL THERAPY 1/> REGIONS: CPT

## 2022-11-18 PROCEDURE — 97110 THERAPEUTIC EXERCISES: CPT

## 2022-11-18 NOTE — PLAN OF CARE
Physical Therapy Treatment Note     Name: Dayana Mejia  Clinic Number: [id number]    Therapy Diagnosis:   Encounter Diagnosis   Name Primary?    Neck pain Yes     Physician: Venkat Tsai MD    Visit Date: 2022    Physician Orders: PT Eval and Treat  Medical Diagnosis from Referral: Cervical spondylosis with radiculopathy  Evaluation Date: 2022  Authorization Period Expiration: Medically Necessary  Plan of Care Expiration: 2023  Visit # / Visits authorized: 1/ As needed    Time In: 0842  Time Out: 09  Total Billable Time: 62 minutes    Surgery:  s/p C5-6, C6-7 ACDF 22  Orthopedic Precautions: None; cleared per surgeon to resume UE lifting at gym  Pertinent History: See medical hx below    Subjective     Patient reports: She had complete pain relief following previous session for about 4 hours.    CMS Impairment/Limitation/Restriction for FOTO Survey  Therapist reviewed FOTO scores for Dayana Mejia on 2022.   FOTO documents entered into EPIC - see Media section.     Eval Patient's Physical FS Primary Measure: 59  Eval Risk Adjusted Statistical FOTO: 44  Eval Limitation Score: 41%  Category: Carrying  Eval NDI: 23.3% disability    Objective     Dayana received the following treatment:    Informed consent obtained after thorough explanation of risks and benefits. Signed consent form will be scanned into medical record. Pre-procedure time out performed which included verification of name, , and site of treatment. 70% isopropyl alcohol wipe down performed to treatment site with single use sterile prep pads.     Time Activities   Manual 37 min TDN done to neck using Seirin 0.30 x 30mm needle and Seirin 0.30 x 40mm needle. 30mm needle each to C5 multifidus and medial suboccipitals (bilaterally for both), and 40mm needle to upper traps (bilaterally). All done with [employer] ES-130 e-stim unit on 3 Hz medium frequency at 3-4 intensity level for 10 minutes.        DTM to posterior neck and upper traps,  suboccipital release, passive bilateral UT stretch, thoracic thrust manipulation (upper and mid t-spine), Biofreeze to posterior neck and UTs       TherAct     TherEx 25 min Chin tuck, chin tuck head lift, horz rows, horz abd, shld ext, cold pack to neck   Gait     Neuro Re-ed     Modalities     E-Stim     Dry Needling     Canalith Repositioning           Home Exercises Provided and Patient Education Provided     Education provided:   -Plan of care, HEP    Assessment     Scapular activation (fernandez) and deep neck flexor activation proves difficult, expected given her surgery and the likely weakness of stabilizing muscles. TrPs found in upper traps and cervical paraspinals with TDN, also expected. She will like face DOMS following this session, but as she gets better cervical stabilization and strength this soreness will gradually lessen in severity.    Patient prognosis is Good.      Anticipated barriers to physical therapy: None    Goals: Dayana Is progressing well towards her goals.  Long Term Goals: 12 weeks   Patient will report at least 20% disability reduction from initial NDI score to indicate clinically significant functional improvement  Patient will report at least 20 point increase on initial FOTO score to indicate clinically significant functional improvement     Plan     2-3x/week x 12 weeks    Angelica Thomas, PT

## 2022-11-21 ENCOUNTER — CLINICAL SUPPORT (OUTPATIENT)
Dept: REHABILITATION | Facility: HOSPITAL | Age: 62
End: 2022-11-21
Payer: COMMERCIAL

## 2022-11-21 DIAGNOSIS — M54.2 NECK PAIN: Primary | ICD-10-CM

## 2022-11-21 PROCEDURE — 97140 MANUAL THERAPY 1/> REGIONS: CPT

## 2022-11-21 PROCEDURE — 97110 THERAPEUTIC EXERCISES: CPT

## 2022-11-21 NOTE — PLAN OF CARE
Physical Therapy Treatment Note     Name: Dayana Mejia  Clinic Number: [id number]    Therapy Diagnosis:   Encounter Diagnosis   Name Primary?    Neck pain Yes     Physician: Venkat Tsai MD    Visit Date: 2022    Physician Orders: PT Eval and Treat  Medical Diagnosis from Referral: Cervical spondylosis with radiculopathy  Evaluation Date: 2022  Authorization Period Expiration: Medically Necessary  Plan of Care Expiration: 2023  Visit # / Visits authorized: 2/ As needed    Time In: 1250  Time Out: 1348  Total Billable Time: 58 minutes    Surgery:  s/p C5-6, C6-7 ACDF 22  Orthopedic Precautions: None; cleared per surgeon to resume UE lifting at gym  Pertinent History: See medical hx below    Subjective     Patient reports: Significant overall improvement. Mild pain in her right neck, however she states she has been largely pain-free since previous session.    CMS Impairment/Limitation/Restriction for FOTO Survey  Therapist reviewed FOTO scores for Dayana Mejia on 2022.   FOTO documents entered into EPIC - see Media section.     Eval Patient's Physical FS Primary Measure: 59  Eval Risk Adjusted Statistical FOTO: 44  Eval Limitation Score: 41%  Category: Carrying  Eval NDI: 23.3% disability    Objective     Dayana received the following treatment:    Informed consent obtained after thorough explanation of risks and benefits. Signed consent form will be scanned into medical record. Pre-procedure time out performed which included verification of name, , and site of treatment. 70% isopropyl alcohol wipe down performed to treatment site with single use sterile prep pads.     Time Activities   Manual 29 min TDN done to neck using Seirin 0.30 x 30mm needle and Seirin 0.30 x 40mm needle. 30mm needle each to left C4, C5, C6 multifidus, 40mm needle to left levator scapula. All done with [employer] ES-130 e-stim unit on 3 Hz medium frequency at 3-4 intensity level for 10 minutes.        DTM and Biofreeze to  posterior neck and left levator scap, mid thoracic spine thrust manipulation       TherAct     TherEx 29 min Chin tuck, chin tuck head lift, horz rows, horz abd, shld ext, MHP to neck   Gait     Neuro Re-ed     Modalities     E-Stim     Dry Needling     Canalith Repositioning           Home Exercises Provided and Patient Education Provided     Education provided:   -Plan of care, HEP    Assessment     Significant overall relief, expected. Scapular activation (fernandez) and deep neck flexor activation to progress, however expect difficulty given her surgery and the likely weakness of stabilizing muscles. TrPs no longer present in upper traps, however present in left cervical paraspinals with TDN. Better thoracic mobility overall. She will like face DOMS following this session, but as she gets better cervical stabilization and strength this soreness will gradually lessen in severity.    Patient prognosis is Good.      Anticipated barriers to physical therapy: None    Goals: Dayana Is progressing well towards her goals.  Long Term Goals: 12 weeks   Patient will report at least 20% disability reduction from initial NDI score to indicate clinically significant functional improvement  Patient will report at least 20 point increase on initial FOTO score to indicate clinically significant functional improvement     Plan     2-3x/week x 12 weeks    Angelica Thomas, PT

## 2022-11-23 ENCOUNTER — CLINICAL SUPPORT (OUTPATIENT)
Dept: REHABILITATION | Facility: HOSPITAL | Age: 62
End: 2022-11-23
Payer: COMMERCIAL

## 2022-11-23 DIAGNOSIS — M54.2 NECK PAIN: Primary | ICD-10-CM

## 2022-11-23 PROCEDURE — 97110 THERAPEUTIC EXERCISES: CPT

## 2022-11-23 NOTE — PLAN OF CARE
Physical Therapy Treatment Note     Name: Dayana Mejia  Clinic Number: [id number]    Therapy Diagnosis:   Encounter Diagnosis   Name Primary?    Neck pain Yes     Physician: Venkat Tsai MD    Visit Date: 2022    Physician Orders: PT Eval and Treat  Medical Diagnosis from Referral: Cervical spondylosis with radiculopathy  Evaluation Date: 2022  Authorization Period Expiration: Medically Necessary  Plan of Care Expiration: 2023  Visit # / Visits authorized: 3/ As needed    Time In: 1255  Time Out: 1335  Total Billable Time: 40 minutes    Surgery:  s/p C5-6, C6-7 ACDF 22  Orthopedic Precautions: None; cleared per surgeon to resume UE lifting at gym  Pertinent History: See medical hx below    Subjective     Patient reports: Significant overall improvement. Neck pain is gone. Has soreness superior border of left scapula along levator insertion. Remains largely pain-free since previous session.    CMS Impairment/Limitation/Restriction for FOTO Survey  Therapist reviewed FOTO scores for Dayana Mejia on 2022.   FOTO documents entered into EPIC - see Media section.     Eval Patient's Physical FS Primary Measure: 59  Eval Risk Adjusted Statistical FOTO: 44  Eval Limitation Score: 41%  Category: Carrying  Eval NDI: 23.3% disability    Objective     Dayana received the following treatment:    Informed consent obtained after thorough explanation of risks and benefits. Signed consent form will be scanned into medical record. Pre-procedure time out performed which included verification of name, , and site of treatment. 70% isopropyl alcohol wipe down performed to treatment site with single use sterile prep pads.     Time Activities   Manual  TDN done to neck using Seirin 0.30 x 30mm needle and Seirin 0.30 x 40mm needle. 30mm needle each to left C4, C5, C6 multifidus, 40mm needle to left levator scapula. All done with [employer] ES-130 e-stim unit on 3 Hz medium frequency at 3-4 intensity level for 10  minutes.        DTM and Biofreeze to posterior neck and left levator scap, mid thoracic spine thrust manipulation       TherAct     TherEx 40 min MHP to left scapula, band (horz rows, horz abd, shld ext, shld ER), left active stretch (levator, upper traps)   Gait     Neuro Re-ed     Modalities     E-Stim     Dry Needling     Canalith Repositioning           Home Exercises Provided and Patient Education Provided     Education provided:   -Plan of care, HEP    Assessment     Significant overall relief, expected. Scapular activation (fernandez) to progress, however expect difficulty given her surgery and the likely weakness of stabilizing muscles. TrPs no longer present in upper traps or cervical paraspinals. Better thoracic mobility overall. She will like face DOMS following this session, but as she gets better scapular stabilization and strength this soreness will gradually lessen in severity.    Patient prognosis is Good.      Anticipated barriers to physical therapy: None    Goals: Dayana Is progressing well towards her goals.  Long Term Goals: 12 weeks   Patient will report at least 20% disability reduction from initial NDI score to indicate clinically significant functional improvement  Patient will report at least 20 point increase on initial FOTO score to indicate clinically significant functional improvement     Plan     2-3x/week x 12 weeks    Angelica Thomas, PT

## 2022-11-28 ENCOUNTER — CLINICAL SUPPORT (OUTPATIENT)
Dept: REHABILITATION | Facility: HOSPITAL | Age: 62
End: 2022-11-28
Payer: COMMERCIAL

## 2022-11-28 DIAGNOSIS — M54.2 NECK PAIN: Primary | ICD-10-CM

## 2022-11-28 PROCEDURE — 97140 MANUAL THERAPY 1/> REGIONS: CPT

## 2022-11-28 PROCEDURE — 97110 THERAPEUTIC EXERCISES: CPT

## 2022-11-28 NOTE — PLAN OF CARE
Physical Therapy Treatment Note     Name: Dayana Mejia  Clinic Number: [id number]    Therapy Diagnosis:   Encounter Diagnosis   Name Primary?    Neck pain Yes     Physician: Venkat Tsai MD    Visit Date: 11/28/2022    Physician Orders: PT Eval and Treat  Medical Diagnosis from Referral: Cervical spondylosis with radiculopathy  Evaluation Date: 11/16/2022  Authorization Period Expiration: Medically Necessary  Plan of Care Expiration: 02/16/2023  Visit # / Visits authorized: 4/ As needed    Time In: 1259  Time Out: 1340  Total Billable Time: 41 minutes    Surgery:  s/p C5-6, C6-7 ACDF 8/2/22  Orthopedic Precautions: None; cleared per surgeon to resume UE lifting at gym  Pertinent History: See medical hx below    Subjective     Patient reports: Significant overall improvement. Neck pain is gone. Has soreness superior border of left scapula along levator insertion. Remains largely pain-free.    CMS Impairment/Limitation/Restriction for FOTO Survey  Therapist reviewed FOTO scores for Dayana Mejia on 11/16/2022.   FOTO documents entered into EPIC - see Media section.     Eval Patient's Physical FS Primary Measure: 59  Eval Risk Adjusted Statistical FOTO: 44  Eval Limitation Score: 41%  Category: Carrying  Eval NDI: 23.3% disability    Objective     Dayana received the following treatment:     Time Activities   Manual 13 min Manual cervical traction grades 1-2, passive left levator scap stretch, STM to left levator scap       TherAct     TherEx 28 min NuStep, supine band (horz abd, shld ext, shld ER), cone stack in scapular plane, scapular depression isometrics (left), cold pack to left scapula   Gait     Neuro Re-ed     Modalities     E-Stim     Dry Needling     Canalith Repositioning           Home Exercises Provided and Patient Education Provided     Education provided:   -Plan of care, HEP    Assessment     Significant overall relief, expected. Post-session soreness to levator eliminated. Scapular activation (fernandez) to  progress, however expect difficulty given her surgery and the likely weakness of stabilizing muscles. TrPs no longer present in upper traps or cervical paraspinals. Better thoracic mobility overall. She will like face DOMS following this session, but as she gets better scapular stabilization and strength this soreness will gradually lessen in severity.    Patient prognosis is Good.      Anticipated barriers to physical therapy: None    Goals: Dayana Is progressing well towards her goals.  Long Term Goals: 12 weeks   Patient will report at least 20% disability reduction from initial NDI score to indicate clinically significant functional improvement  Patient will report at least 20 point increase on initial FOTO score to indicate clinically significant functional improvement     Plan     2-3x/week x 12 weeks    Angelica Thomas, PT

## 2022-11-30 ENCOUNTER — CLINICAL SUPPORT (OUTPATIENT)
Dept: REHABILITATION | Facility: HOSPITAL | Age: 62
End: 2022-11-30
Payer: COMMERCIAL

## 2022-11-30 DIAGNOSIS — M54.2 NECK PAIN: Primary | ICD-10-CM

## 2022-11-30 PROCEDURE — 97110 THERAPEUTIC EXERCISES: CPT

## 2022-11-30 NOTE — PLAN OF CARE
Physical Therapy Treatment Note     Name: Dayana Mejia  Clinic Number: [id number]    Therapy Diagnosis:   Encounter Diagnosis   Name Primary?    Neck pain Yes     Physician: Venkat Tsai MD    Visit Date: 11/30/2022    Physician Orders: PT Eval and Treat  Medical Diagnosis from Referral: Cervical spondylosis with radiculopathy  Evaluation Date: 11/16/2022  Authorization Period Expiration: Medically Necessary  Plan of Care Expiration: 02/16/2023  Visit # / Visits authorized: 5/ As needed    Time In: 1246  Time Out: 1335  Total Billable Time: 49 minutes    Surgery:  s/p C5-6, C6-7 ACDF 8/2/22  Orthopedic Precautions: None; cleared per surgeon to resume UE lifting at gym  Pertinent History: See medical hx below    Subjective     Patient reports: Significant overall improvement. Neck pain is gone. Has soreness superior border of left scapula along levator insertion. Remains largely pain-free.    CMS Impairment/Limitation/Restriction for FOTO Survey  Therapist reviewed FOTO scores for Dayana Mejia on 11/16/2022.   FOTO documents entered into EPIC - see Media section.     Eval Patient's Physical FS Primary Measure: 59  Eval Risk Adjusted Statistical FOTO: 44  Eval Limitation Score: 41%  Category: Carrying  Eval NDI: 23.3% disability    Objective     Dayana received the following treatment:     Time Activities   Manual  Manual cervical traction grades 1-2, passive left levator scap stretch, STM to left levator scap       TherAct     TherEx 49 min NuStep, band (horz abd, shld ext, shld ER, horz rows, w-row), cone stack in scapular plane, scapular depression isometrics (left), stretching (bilateral wall pec, left levator), MHP to neck   Gait     Neuro Re-ed     Modalities     E-Stim     Dry Needling     Canalith Repositioning           Home Exercises Provided and Patient Education Provided     Education provided:   -Plan of care, HEP    Assessment     Significant overall relief, expected. Post-session soreness to levator  eliminated. Scapular activation (fernandez) to progress, however expect difficulty given her surgery and the likely weakness of stabilizing muscles. TrPs no longer present in upper traps or cervical paraspinals. Better thoracic mobility overall. She will like face DOMS following this session, but as she gets better scapular stabilization and strength this soreness will gradually lessen in severity.    Patient prognosis is Good.      Anticipated barriers to physical therapy: None    Goals: Dayana Is progressing well towards her goals.  Long Term Goals: 12 weeks   Patient will report at least 20% disability reduction from initial NDI score to indicate clinically significant functional improvement  Patient will report at least 20 point increase on initial FOTO score to indicate clinically significant functional improvement     Plan     2-3x/week x 12 weeks    Angelica Thomas, PT

## 2022-12-02 ENCOUNTER — CLINICAL SUPPORT (OUTPATIENT)
Dept: REHABILITATION | Facility: HOSPITAL | Age: 62
End: 2022-12-02
Payer: COMMERCIAL

## 2022-12-02 DIAGNOSIS — M54.2 NECK PAIN: Primary | ICD-10-CM

## 2022-12-02 PROCEDURE — 97530 THERAPEUTIC ACTIVITIES: CPT

## 2022-12-02 PROCEDURE — 97140 MANUAL THERAPY 1/> REGIONS: CPT

## 2022-12-02 NOTE — PLAN OF CARE
Physical Therapy Treatment Note     Name: Dayana Mejia  Clinic Number: [id number]    Therapy Diagnosis:   Encounter Diagnosis   Name Primary?    Neck pain Yes     Physician: Venkat Tsai MD    Visit Date: 12/2/2022    Physician Orders: PT Eval and Treat  Medical Diagnosis from Referral: Cervical spondylosis with radiculopathy  Evaluation Date: 11/16/2022  Authorization Period Expiration: Medically Necessary  Plan of Care Expiration: 02/16/2023  Visit # / Visits authorized: 6/ As needed    Time In: 1247  Time Out: 1320  Total Billable Time: 33 minutes    Surgery:  s/p C5-6, C6-7 ACDF 8/2/22  Orthopedic Precautions: None; cleared per surgeon to resume UE lifting at gym  Pertinent History: See medical hx below    Subjective     Patient reports: Significant overall improvement. Levator scap pain 2/10, significantly reduced. States back of neck just feels tight 3/10.    CMS Impairment/Limitation/Restriction for FOTO Survey  Therapist reviewed FOTO scores for Dayana Mejia on 11/16/2022.   FOTO documents entered into EPIC - see Media section.  Patient's Physical FS Primary Measure: 59  Risk Adjusted Statistical FOTO: 44  Eval Limitation Score: 41%  Category: Carrying  NDI: 23.3% disability      Therapist reviewed FOTO scores for Dayana Mejia on 12/02/2022.   FOTO documents entered into EPIC - see Media section.  Patient's Physical FS Primary Measure: 49  Risk Adjusted Statistical FOTO: 44  Eval Limitation Score: 51%  Category: Carrying  NDI: 38.2% disability    Objective     Dayana received the following treatment:     Time Activities   Manual 20 min Manual cervical traction, suboccipital release, lateral cervical glides (C2-C7), STM and MFR to posterior, Biofreeze to posterior neck and periscapular area   TherAct 13 min MHP to neck   TherEx  NuStep, band (horz abd, shld ext, shld ER, horz rows, w-row), cone stack in scapular plane, scapular depression isometrics (left), stretching (bilateral wall pec, left levator), MHP to  neck   Gait     Neuro Re-ed     Modalities     E-Stim     Dry Needling     Canalith Repositioning           Home Exercises Provided and Patient Education Provided     Education provided:   -Plan of care, HEP    Assessment     Post session pain 0.5/10 posterior neck and only soreness to left levator scap. She has seen significant overall relief of pain, increase in functional neck ROM, and improved thoracic mobility. Scapular activation to cautiously progress given her post-exercise soreness, but as she gets better scapular stabilization and strength this soreness will gradually lessen in severity.      Of note, FOTO and NDI scores are not accurate representation of her clinical presentation; she reports significant improvement, however scores show reduced functional capacity and increased disability.    Patient prognosis is Good.      Anticipated barriers to physical therapy: None    Goals: Dayana Is progressing well towards her goals.  Long Term Goals: 12 weeks   Patient will report at least 20% disability reduction from initial NDI score to indicate clinically significant functional improvement  Patient will report at least 20 point increase on initial FOTO score to indicate clinically significant functional improvement     Plan     2-3x/week x 12 weeks    Angelica Thomas, PT

## 2022-12-05 ENCOUNTER — CLINICAL SUPPORT (OUTPATIENT)
Dept: REHABILITATION | Facility: HOSPITAL | Age: 62
End: 2022-12-05
Payer: COMMERCIAL

## 2022-12-05 DIAGNOSIS — M54.2 NECK PAIN: Primary | ICD-10-CM

## 2022-12-05 PROCEDURE — 97140 MANUAL THERAPY 1/> REGIONS: CPT

## 2022-12-05 PROCEDURE — 97530 THERAPEUTIC ACTIVITIES: CPT

## 2022-12-05 NOTE — PLAN OF CARE
Physical Therapy Treatment Note     Name: Dayana Mejia  Clinic Number: [id number]    Therapy Diagnosis:   Encounter Diagnosis   Name Primary?    Neck pain Yes     Physician: Venkat Tsai MD    Visit Date: 12/5/2022    Physician Orders: PT Eval and Treat  Medical Diagnosis from Referral: Cervical spondylosis with radiculopathy  Evaluation Date: 11/16/2022  Authorization Period Expiration: Medically Necessary  Plan of Care Expiration: 02/16/2023  Visit # / Visits authorized: 7/ As needed    Time In: 1250  Time Out: 1320  Total Billable Time: 30 minutes    Surgery:  s/p C5-6, C6-7 ACDF 8/2/22  Orthopedic Precautions: None; cleared per surgeon to resume UE lifting at gym  Pertinent History: See medical hx below    Subjective     Patient reports: Significant overall improvement. She finds that the neck stretches from previous session made her overall functional mobility much better. No pain at current, just very mild soreness in her neck (posterior).    CMS Impairment/Limitation/Restriction for FOTO Survey  Therapist reviewed FOTO scores for Dayana Mejia on 11/16/2022.   FOTO documents entered into EPIC - see Media section.  Patient's Physical FS Primary Measure: 59  Risk Adjusted Statistical FOTO: 44  Eval Limitation Score: 41%  Category: Carrying  NDI: 23.3% disability      Therapist reviewed FOTO scores for Dayana Mejia on 12/02/2022.   FOTO documents entered into EPIC - see Media section.  Patient's Physical FS Primary Measure: 49  Risk Adjusted Statistical FOTO: 44  Eval Limitation Score: 51%  Category: Carrying  NDI: 38.2% disability    Objective     Dayana received the following treatment:     Time Activities   Manual 19 min Manual cervical traction, suboccipital release, lateral cervical glides (C2-C7), STM and MFR to posterior, Biofreeze to posterior neck and periscapular area   TherAct 11 min MHP to neck   TherEx  NuStep, band (horz abd, shld ext, shld ER, horz rows, w-row), cone stack in scapular plane, scapular  depression isometrics (left), stretching (bilateral wall pec, left levator), MHP to neck   Gait     Neuro Re-ed     Modalities     E-Stim     Dry Needling     Canalith Repositioning           Home Exercises Provided and Patient Education Provided     Education provided:   -Plan of care, HEP    Assessment     Post session pain 0.5/10 posterior neck and only soreness to left levator scap. She has seen significant overall relief of pain, increase in functional neck ROM, and improved thoracic mobility. Scapular activation to cautiously progress given her post-exercise soreness, but as she gets better scapular stabilization and strength this soreness will gradually lessen in severity.      Of note, FOTO and NDI scores are not accurate representation of her clinical presentation; she reports significant improvement, however scores show reduced functional capacity and increased disability.    Patient prognosis is Good.      Anticipated barriers to physical therapy: None    Goals: Dayana Is progressing well towards her goals.  Long Term Goals: 12 weeks   Patient will report at least 20% disability reduction from initial NDI score to indicate clinically significant functional improvement  Patient will report at least 20 point increase on initial FOTO score to indicate clinically significant functional improvement     Plan     2-3x/week x 12 weeks    Angelica Thomas, PT

## 2022-12-07 ENCOUNTER — CLINICAL SUPPORT (OUTPATIENT)
Dept: REHABILITATION | Facility: HOSPITAL | Age: 62
End: 2022-12-07
Attending: NEUROLOGICAL SURGERY
Payer: COMMERCIAL

## 2022-12-07 DIAGNOSIS — M54.2 NECK PAIN: Primary | ICD-10-CM

## 2022-12-07 PROCEDURE — 97110 THERAPEUTIC EXERCISES: CPT

## 2022-12-07 NOTE — PLAN OF CARE
Physical Therapy Treatment Note     Name: Dayana Mejia  Clinic Number: [id number]    Therapy Diagnosis:   Encounter Diagnosis   Name Primary?    Neck pain Yes     Physician: Venkat Tsai MD    Visit Date: 12/7/2022    Physician Orders: PT Eval and Treat  Medical Diagnosis from Referral: Cervical spondylosis with radiculopathy  Evaluation Date: 11/16/2022  Authorization Period Expiration: Medically Necessary  Plan of Care Expiration: 02/16/2023  Visit # / Visits authorized: 8/ As needed    Time In: 0920  Time Out: 0958  Total Billable Time: 38 minutes    Surgery:  s/p C5-6, C6-7 ACDF 8/2/22  Orthopedic Precautions: None; cleared per surgeon to resume UE lifting at gym  Pertinent History: See medical hx below    Subjective     Patient reports: Significant overall improvement. She finds that the neck stretches from previous session made her overall functional mobility much better, however she still finds herself turning her whole body instead of isolating her neck (from habit). No pain at current, just very mild soreness in her neck (posterior).    CMS Impairment/Limitation/Restriction for FOTO Survey  Therapist reviewed FOTO scores for Dayana Mejia on 11/16/2022.   FOTO documents entered into EPIC - see Media section.  Patient's Physical FS Primary Measure: 59  Risk Adjusted Statistical FOTO: 44  Eval Limitation Score: 41%  Category: Carrying  NDI: 23.3% disability      Therapist reviewed FOTO scores for Dayana Mejia on 12/02/2022.   FOTO documents entered into EPIC - see Media section.  Patient's Physical FS Primary Measure: 49  Risk Adjusted Statistical FOTO: 44  Eval Limitation Score: 51%  Category: Carrying  NDI: 38.2% disability    Objective     Dayana received the following treatment:     Time Activities   Manual  Manual cervical traction, suboccipital release, lateral cervical glides (C2-C7), STM and MFR to posterior, Biofreeze to posterior neck and periscapular area   TherAct  MHP to neck   TherEx 38 min MHP to  neck, self stretching left (UTs, levator scap), towel assisted mobilization (right rotation, flexion), bilateral cervical rotation with hands behind back, Biofreeze to neck and scapula (periscap, on scap), HEP   Gait     Neuro Re-ed     Modalities     E-Stim     Dry Needling     Canalith Repositioning           Home Exercises Provided and Patient Education Provided     Education provided:   -Plan of care, HEP (updated to EMR in media section; will provide and review on her discharge visit next week), isolated cervical movements to help improve mobility    Assessment     Post session pain none, just mild soreness but felt better overall compared to when she came into PT. We are now transitioning to begin independent home program, which largely includes AROM, self mobilization, and stretches to help improve flexibility and overall motion during ADLs. She has adapted a pattern of turning her head rather then isolating cervical movement. If she keeps to this HEP, expect her mobility and functional capacity to improve, her pain to remain at 0, and her soreness to reduce. She does have some surgical pain that will require time to heal. She has seen significant overall relief of pain from PT. Scapular activation will likely be advised for her independent gym activities when she is ready to start them. Will finish next week with 2 sessions followed by discharge from OPPT.    Of note, FOTO and NDI scores are not accurate representation of her clinical presentation; she reports significant improvement, however scores show reduced functional capacity and increased disability.    Patient prognosis is Good.      Anticipated barriers to physical therapy: None    Goals: Dayana Is progressing well towards her goals.  Long Term Goals: 12 weeks   Patient will report at least 20% disability reduction from initial NDI score to indicate clinically significant functional improvement  Patient will report at least 20 point increase on  initial FOTO score to indicate clinically significant functional improvement     Plan     2-3x/week x 12 weeks    Angelica Thomas, PT

## 2022-12-12 ENCOUNTER — CLINICAL SUPPORT (OUTPATIENT)
Dept: REHABILITATION | Facility: HOSPITAL | Age: 62
End: 2022-12-12
Payer: COMMERCIAL

## 2022-12-12 DIAGNOSIS — M54.2 NECK PAIN: Primary | ICD-10-CM

## 2022-12-12 PROCEDURE — 97110 THERAPEUTIC EXERCISES: CPT

## 2022-12-12 NOTE — PLAN OF CARE
Physical Therapy Treatment Note     Name: Dayana Mejia  Clinic Number: [id number]    Therapy Diagnosis:   Encounter Diagnosis   Name Primary?    Neck pain Yes     Physician: Venkat Tsai MD    Visit Date: 12/12/2022    Physician Orders: PT Eval and Treat  Medical Diagnosis from Referral: Cervical spondylosis with radiculopathy  Evaluation Date: 11/16/2022  Authorization Period Expiration: Medically Necessary  Plan of Care Expiration: 02/16/2023  Visit # / Visits authorized: 9/ As needed    Time In: 1242  Time Out: 1314  Total Billable Time: 32 minutes    Surgery:  s/p C5-6, C6-7 ACDF 8/2/22  Orthopedic Precautions: None; cleared per surgeon to resume UE lifting at gym  Pertinent History: See medical hx below    Subjective     Patient reports: Significant overall improvement. She finds that the neck stretches from previous session made her overall functional mobility much better, however she still finds herself turning her whole body instead of isolating her neck (from habit). No pain at current, just very mild soreness in her neck (posterior).    CMS Impairment/Limitation/Restriction for FOTO Survey  Therapist reviewed FOTO scores for Dayana Mejia on 11/16/2022.   FOTO documents entered into EPIC - see Media section.  Patient's Physical FS Primary Measure: 59  Risk Adjusted Statistical FOTO: 44  Eval Limitation Score: 41%  Category: Carrying  NDI: 23.3% disability      Therapist reviewed FOTO scores for Dayana Mejia on 12/02/2022.   FOTO documents entered into EPIC - see Media section.  Patient's Physical FS Primary Measure: 49  Risk Adjusted Statistical FOTO: 44  Eval Limitation Score: 51%  Category: Carrying  NDI: 38.2% disability    Objective     Dayana received the following treatment:     Time Activities   Manual  Manual cervical traction, suboccipital release, lateral cervical glides (C2-C7), STM and MFR to posterior, Biofreeze to posterior neck and periscapular area   TherAct  MHP to neck   TherEx 32 min MHP to  neck, self stretching left (UTs, levator scap), towel assisted mobilization (right rotation, flexion), bilateral cervical rotation, HEP   Gait     Neuro Re-ed     Modalities     E-Stim     Dry Needling     Canalith Repositioning           Home Exercises Provided and Patient Education Provided     Education provided:   -Plan of care, HEP (updated to EMR in media section; provided and reviewed this session), isolated cervical movements to help improve mobility    Assessment     Post session pain none, just mild soreness but felt better overall compared to when she came into PT. We are now transitioning to begin independent home program, which largely includes AROM, self mobilization, and stretches to help improve flexibility and overall motion during ADLs. She has adapted a pattern of turning her head rather then isolating cervical movement. If she keeps to this HEP, expect her mobility and functional capacity to improve, her pain to remain at 0, and her soreness to reduce. She does have some surgical pain that will require time to heal. She has seen significant overall relief of pain from PT. Scapular activation will likely be advised for her independent gym activities when she is ready to start them. Will finish next week with 2 sessions followed by discharge from OPPT.    Of note, FOTO and NDI scores are not accurate representation of her clinical presentation; she reports significant improvement, however scores show reduced functional capacity and increased disability.    Patient prognosis is Good.      Anticipated barriers to physical therapy: None    Goals: Dayana Is progressing well towards her goals.  Long Term Goals: 12 weeks   Patient will report at least 20% disability reduction from initial NDI score to indicate clinically significant functional improvement  Patient will report at least 20 point increase on initial FOTO score to indicate clinically significant functional improvement     Plan     2-3x/week x  12 weeks    Angelica Thomas, PT

## 2022-12-16 ENCOUNTER — CLINICAL SUPPORT (OUTPATIENT)
Dept: REHABILITATION | Facility: HOSPITAL | Age: 62
End: 2022-12-16
Attending: NEUROLOGICAL SURGERY
Payer: COMMERCIAL

## 2022-12-16 DIAGNOSIS — M54.2 NECK PAIN: Primary | ICD-10-CM

## 2022-12-16 DIAGNOSIS — M47.22 CERVICAL SPONDYLOSIS WITH RADICULOPATHY: ICD-10-CM

## 2022-12-16 PROCEDURE — 97110 THERAPEUTIC EXERCISES: CPT

## 2022-12-16 NOTE — PLAN OF CARE
Physical Therapy Treatment Note     Name: Dayana Mejia  Clinic Number: [id number]    Therapy Diagnosis:   Encounter Diagnoses   Name Primary?    Neck pain Yes    Cervical spondylosis with radiculopathy      Physician: Venkat Tsai MD    Visit Date: 12/16/2022    Physician Orders: PT Eval and Treat  Medical Diagnosis from Referral: Cervical spondylosis with radiculopathy  Evaluation Date: 11/16/2022  Authorization Period Expiration: Medically Necessary  Plan of Care Expiration: 02/16/2023  Visit # / Visits authorized: 10/ As needed    Time In: 1205  Time Out: 1230  Total Billable Time: 25 minutes    Surgery:  s/p C5-6, C6-7 ACDF 8/2/22  Orthopedic Precautions: None; cleared per surgeon to resume UE lifting at gym  Pertinent History: See medical hx below    Subjective     Patient reports: Has been doing HEP while at work, but still struggles with towel rotation stretch. Otherwise, she has significant overall improvement as compared to prior to PT. She is ready for discharge to independent [facility].    CMS Impairment/Limitation/Restriction for FOTO Survey  Therapist reviewed FOTO scores for Dayana Mejia on 11/16/2022.   FOTO documents entered into EPIC - see Media section.  Patient's Physical FS Primary Measure: 59  Risk Adjusted Statistical FOTO: 44  Eval Limitation Score: 41%  Category: Carrying  NDI: 23.3% disability      Therapist reviewed FOTO scores for Dayana Mejia on 12/02/2022.   FOTO documents entered into EPIC - see Media section.  Patient's Physical FS Primary Measure: 49  Risk Adjusted Statistical FOTO: 44  Eval Limitation Score: 51%  Category: Carrying  NDI: 38.2% disability        Therapist reviewed FOTO scores for Dayana Mejia on 12/16/2022.   FOTO documents entered into EPIC - see Media section.  Patient's Physical FS Primary Measure: 59  Risk Adjusted Statistical FOTO: 44  Eval Limitation Score: 41%  Category: Carrying  NDI: 23.3% disability    Objective     Dayana received the following treatment:     Time  Activities   Manual  Manual cervical traction, suboccipital release, lateral cervical glides (C2-C7), STM and MFR to posterior, Biofreeze to posterior neck and periscapular area   TherAct  MHP to neck   TherEx 25 min MHP to neck, towel assisted mobilization right cervical rotation, HEP   Gait     Neuro Re-ed     Modalities     E-Stim     Dry Needling     Canalith Repositioning           Home Exercises Provided and Patient Education Provided     Education provided:   -Plan of care, HEP (reviewed this session), isolated cervical movements to help improve mobility    Assessment     We are now transitioning to independent home program, which largely includes AROM, self mobilization, and stretches to help improve flexibility and overall motion during ADLs. She has adapted a pattern of turning her head rather then isolating cervical movement. If she keeps to this HEP, expect her mobility and functional capacity to improve, her pain to remain at 0, and her soreness to reduce. She does have some surgical pain that will require time to heal. She has seen significant overall relief of pain from PT. Scapular activation will likely be advised for her independent gym activities when she is ready to start them.    Of note, FOTO and NDI scores are not accurate representation of her clinical presentation; she reports significant improvement, however scores show same as that during evaluation.    Patient prognosis is Good.      Anticipated barriers to physical therapy: None    Goals: Dayana Is progressing well towards her goals.  Long Term Goals: 12 weeks   Patient will report at least 20% disability reduction from initial NDI score to indicate clinically significant functional improvement  Patient will report at least 20 point increase on initial FOTO score to indicate clinically significant functional improvement     Plan     Discharge to independent HEP.    Angelica Thomas, PT

## 2023-02-08 ENCOUNTER — HOSPITAL ENCOUNTER (OUTPATIENT)
Dept: RADIOLOGY | Facility: HOSPITAL | Age: 63
Discharge: HOME | End: 2023-02-08
Attending: NEUROLOGICAL SURGERY
Payer: COMMERCIAL

## 2023-02-08 ENCOUNTER — OFFICE VISIT (OUTPATIENT)
Dept: NEUROSURGERY | Facility: CLINIC | Age: 63
End: 2023-02-08
Payer: COMMERCIAL

## 2023-02-08 VITALS
WEIGHT: 149 LBS | BODY MASS INDEX: 24.83 KG/M2 | RESPIRATION RATE: 16 BRPM | HEIGHT: 65 IN | DIASTOLIC BLOOD PRESSURE: 68 MMHG | HEART RATE: 90 BPM | SYSTOLIC BLOOD PRESSURE: 118 MMHG

## 2023-02-08 DIAGNOSIS — M81.0 AGE-RELATED OSTEOPOROSIS WITHOUT CURRENT PATHOLOGICAL FRACTURE: ICD-10-CM

## 2023-02-08 DIAGNOSIS — M47.12 CERVICAL SPONDYLOSIS WITH MYELOPATHY: ICD-10-CM

## 2023-02-08 DIAGNOSIS — M47.12 CERVICAL SPONDYLOSIS WITH MYELOPATHY: Primary | ICD-10-CM

## 2023-02-08 DIAGNOSIS — Z98.1 STATUS POST CERVICAL SPINAL FUSION: ICD-10-CM

## 2023-02-08 DIAGNOSIS — M47.22 CERVICAL SPONDYLOSIS WITH RADICULOPATHY: Primary | ICD-10-CM

## 2023-02-08 PROCEDURE — 72040 X-RAY EXAM NECK SPINE 2-3 VW: CPT | Mod: TC

## 2023-02-08 PROCEDURE — 1159F MED LIST DOCD IN RCRD: CPT | Mod: CPTII,,, | Performed by: NEUROLOGICAL SURGERY

## 2023-02-08 PROCEDURE — 3078F PR MOST RECENT DIASTOLIC BLOOD PRESSURE < 80 MM HG: ICD-10-PCS | Mod: CPTII,,, | Performed by: NEUROLOGICAL SURGERY

## 2023-02-08 PROCEDURE — 99213 OFFICE O/P EST LOW 20 MIN: CPT | Mod: ,,, | Performed by: NEUROLOGICAL SURGERY

## 2023-02-08 PROCEDURE — 99213 PR OFFICE/OUTPT VISIT, EST, LEVL III, 20-29 MIN: ICD-10-PCS | Mod: ,,, | Performed by: NEUROLOGICAL SURGERY

## 2023-02-08 PROCEDURE — 3008F PR BODY MASS INDEX (BMI) DOCUMENTED: ICD-10-PCS | Mod: CPTII,,, | Performed by: NEUROLOGICAL SURGERY

## 2023-02-08 PROCEDURE — 3074F SYST BP LT 130 MM HG: CPT | Mod: CPTII,,, | Performed by: NEUROLOGICAL SURGERY

## 2023-02-08 PROCEDURE — 1160F PR REVIEW ALL MEDS BY PRESCRIBER/CLIN PHARMACIST DOCUMENTED: ICD-10-PCS | Mod: CPTII,,, | Performed by: NEUROLOGICAL SURGERY

## 2023-02-08 PROCEDURE — 3074F PR MOST RECENT SYSTOLIC BLOOD PRESSURE < 130 MM HG: ICD-10-PCS | Mod: CPTII,,, | Performed by: NEUROLOGICAL SURGERY

## 2023-02-08 PROCEDURE — 1160F RVW MEDS BY RX/DR IN RCRD: CPT | Mod: CPTII,,, | Performed by: NEUROLOGICAL SURGERY

## 2023-02-08 PROCEDURE — 3008F BODY MASS INDEX DOCD: CPT | Mod: CPTII,,, | Performed by: NEUROLOGICAL SURGERY

## 2023-02-08 PROCEDURE — 3078F DIAST BP <80 MM HG: CPT | Mod: CPTII,,, | Performed by: NEUROLOGICAL SURGERY

## 2023-02-08 PROCEDURE — 1159F PR MEDICATION LIST DOCUMENTED IN MEDICAL RECORD: ICD-10-PCS | Mod: CPTII,,, | Performed by: NEUROLOGICAL SURGERY

## 2023-02-08 RX ORDER — TAMSULOSIN HYDROCHLORIDE 0.4 MG/1
1 CAPSULE ORAL EVERY MORNING
COMMUNITY
Start: 2023-01-30

## 2023-02-08 RX ORDER — ALLOPURINOL 100 MG/1
100 TABLET ORAL 2 TIMES DAILY
COMMUNITY
Start: 2023-02-06

## 2023-02-08 NOTE — PROGRESS NOTES
[facility]  Neurosurgery        Dayana Mejia   [id number]   1960         CHIEF COMPLAINT:    6 months post-op    HPI:    Dayana Mejia is a 62 y.o.-year-old female who presents today for post-operative follow-up.  She is s/p C5-6, C6-7 ACDF that was done on 8/2/22.  She has continued to improve since surgery.  She complains of pain in the left shoulder area that is worsened with movement.  She is planning to return to Dr. Valdovinos for this.  She has received her bone growth stimulator and is using it as instructed.       Review of patient's allergies indicates:   Allergen Reactions    Codeine Nausea And Vomiting     Other reaction(s): Vomiting (disorder)         Current Outpatient Medications   Medication Sig Dispense Refill    allopurinoL (ZYLOPRIM) 100 MG tablet Take 100 mg by mouth 3 (three) times daily.      AMITIZA 24 mcg Cap Take 24 mcg by mouth daily as needed.      ascorbic acid, vitamin C, (VITAMIN C) 1000 MG tablet Take 1,000 mg by mouth once daily.      azelastine (ASTELIN) 137 mcg (0.1 %) nasal spray 1 spray 2 (two) times daily.      biotin 10,000 mcg Cap Take by mouth 2 (two) times a day.      cetirizine (ZYRTEC) 10 MG tablet Take 10 mg by mouth once daily.      gabapentin (NEURONTIN) 300 MG capsule 1 capsule by mouth in the morning and in the afternoon, 2 capsules by mouth at bedtime (Patient taking differently: 2 (two) times daily.) 120 capsule 2    metFORMIN (GLUCOPHAGE) 500 MG tablet 2 (two) times a day.      methocarbamoL (ROBAXIN) 500 MG Tab Take 1-2 tablets (500-1,000 mg total) by mouth 3 (three) times daily as needed (muscle spasms). 40 tablet 2    NON FORMULARY MEDICATION Take 1 tablet by mouth 2 (two) times a day. Calcium 500+ Vit D and Magnesium      raloxifene (EVISTA) 60 mg tablet Take 60 mg by mouth once daily.      rosuvastatin (CRESTOR) 40 MG Tab Take 40 mg by mouth once daily at 6am.      SYMBICORT 160-4.5 mcg/actuation HFAA Inhale 1 puff into the lungs every 12 (twelve)  hours.      tamsulosin (FLOMAX) 0.4 mg Cap Take 1 capsule by mouth Daily.      temazepam (RESTORIL) 15 mg Cap Take 15 mg by mouth nightly as needed.      triamterene-hydrochlorothiazide 37.5-25 mg (MAXZIDE-25) 37.5-25 mg per tablet Take 1 tablet by mouth once daily.      VASCEPA 1 gram Cap Take 1 capsule by mouth 2 (two) times a day.       No current facility-administered medications for this visit.       Past Medical History:   Diagnosis Date    Anxiety     Asthma     Cervical spondylosis with myelopathy     Cervical spondylosis with radiculopathy     Decreased shoulder mobility, left     DM (diabetes mellitus)     Dyslipidemia     Granuloma annulare     History of DVT (deep vein thrombosis) 2015    History of renal cell carcinoma     HTN (hypertension)     IBS (irritable bowel syndrome)     Left arm pain     Left arm weakness     Left shoulder pain     Migraine     Neuropathy, arm, left     OA (osteoarthritis)     Type 2 diabetes mellitus without complications      Past Surgical History:   Procedure Laterality Date    ANTERIOR CERVICAL DISCECTOMY W/ FUSION N/A 8/2/2022    Procedure: DISCECTOMY, SPINE, CERVICAL, ANTERIOR APPROACH, WITH FUSION;  Surgeon: Venkat Tsai MD;  Location: [facility];  Service: Neurosurgery;  Laterality: N/A;  C5-6, C6-7 ACDF  TIVA setup  NTI    BREAST MASS EXCISION Left 2012    CHOLECYSTECTOMY  1987    open    COLONOSCOPY      HERNIA REPAIR  1987    LAPAROSCOPIC NEPHRECTOMY Left 05/05/2017    Dr. Chucho Au    Left C5-6, C6-7 foraminotomies  06/20/2019    Dr. Tsai     Family History       Problem Relation (Age of Onset)    Cancer Father    Coronary artery disease Mother, Father    Hyperlipidemia Mother    Hypertension Mother    Stroke Mother          Social History     Socioeconomic History    Marital status:    Tobacco Use    Smoking status: Every Day     Packs/day: 0.50     Years: 45.00     Pack years: 22.50     Types: Cigarettes    Smokeless tobacco: Never   Substance and  "Sexual Activity    Alcohol use: Yes     Alcohol/week: 6.0 standard drinks     Types: 3 Glasses of wine, 3 Cans of beer per week       Review of systems:    Pertinent items are noted in HPI.      Vital Signs  Pulse: 90  Resp: 16  BP: 118/68  Pain Score:   6  Height: 5' 5" (165.1 cm)  Weight: 67.6 kg (149 lb)  Body mass index is 24.79 kg/m².      Physical Exam:    General:  Pleasant. Well-nourished. Alert. No acute distress.    Head:  Normocephalic, without obvious abnormality, atraumatic    Lungs:   Breathing is quiet, non-lablored    Neurological:    Oriented to Person, Place, Time   Speech:  normal  Memory, cognition, and affect are appropriate.  Motor Strength: Moves all extremities spontaneously with good tone.  No abnormal movements seen.      Cervical incision:  Well healed    Gait:  normal    Imaging:  Cervical x-rays from today show good position of hardware with progressive bone graft incorporation.  Alignment is stable.    ASSESSMENT:     1. Cervical spondylosis with radiculopathy      2. Status post cervical spinal fusion    -Instructed to add vitamin D 5000IU/day  -Repeat vitamin D level prior to next appt  -Xray cervical with flexion/extension views prior to next appt  - Follow up 1 year postop          I, Dr. Venkat Tsai, personally performed the services described in this documentation. All medical record entries made by the scribe, Nery Aldrich RN, were at my direction and in my presence.  I have reviewed the chart and agree that the record reflects my personal performance and is accurate and complete. Venkat Tsai MD.  10:28 AM 02/08/2023           Venkat Tsai MD FACS [facility]          "

## 2023-03-16 ENCOUNTER — LAB VISIT (OUTPATIENT)
Dept: LAB | Facility: HOSPITAL | Age: 63
End: 2023-03-16
Attending: FAMILY MEDICINE
Payer: COMMERCIAL

## 2023-03-16 DIAGNOSIS — Z00.00 ROUTINE GENERAL MEDICAL EXAMINATION AT A HEALTH CARE FACILITY: Primary | ICD-10-CM

## 2023-03-16 LAB
APPEARANCE UR: CLEAR
BACTERIA #/AREA URNS AUTO: NORMAL /HPF
BASOPHILS # BLD AUTO: 0.04 X10(3)/MCL (ref 0–0.2)
BASOPHILS NFR BLD AUTO: 0.6 %
BILIRUB UR QL STRIP.AUTO: NEGATIVE MG/DL
COLOR UR AUTO: YELLOW
CREAT UR-MCNC: 112.7 MG/DL (ref 47–110)
EOSINOPHIL # BLD AUTO: 0.32 X10(3)/MCL (ref 0–0.9)
EOSINOPHIL NFR BLD AUTO: 4.7 %
ERYTHROCYTE [DISTWIDTH] IN BLOOD BY AUTOMATED COUNT: 13.1 % (ref 11.5–17)
GLUCOSE UR QL STRIP.AUTO: NEGATIVE MG/DL
HCT VFR BLD AUTO: 45.8 % (ref 37–47)
HGB BLD-MCNC: 15.4 G/DL (ref 12–16)
IMM GRANULOCYTES # BLD AUTO: 0.03 X10(3)/MCL (ref 0–0.04)
IMM GRANULOCYTES NFR BLD AUTO: 0.4 %
KETONES UR QL STRIP.AUTO: NEGATIVE MG/DL
LEUKOCYTE ESTERASE UR QL STRIP.AUTO: NEGATIVE UNIT/L
LYMPHOCYTES # BLD AUTO: 1.7 X10(3)/MCL (ref 0.6–4.6)
LYMPHOCYTES NFR BLD AUTO: 25 %
MCH RBC QN AUTO: 31.4 PG
MCHC RBC AUTO-ENTMCNC: 33.6 G/DL (ref 33–36)
MCV RBC AUTO: 93.3 FL (ref 80–94)
MICROALBUMIN UR-MCNC: 531.3 UG/ML
MICROALBUMIN/CREAT RATIO PNL UR: 471.4 MG/GM CR (ref 0–30)
MONOCYTES # BLD AUTO: 0.49 X10(3)/MCL (ref 0.1–1.3)
MONOCYTES NFR BLD AUTO: 7.2 %
NEUTROPHILS # BLD AUTO: 4.21 X10(3)/MCL (ref 2.1–9.2)
NEUTROPHILS NFR BLD AUTO: 62.1 %
NITRITE UR QL STRIP.AUTO: NEGATIVE
NRBC BLD AUTO-RTO: 0 %
PH UR STRIP.AUTO: 8 [PH]
PLATELET # BLD AUTO: 215 X10(3)/MCL (ref 130–400)
PMV BLD AUTO: 10.6 FL (ref 7.4–10.4)
PROT UR QL STRIP.AUTO: ABNORMAL MG/DL
RBC # BLD AUTO: 4.91 X10(6)/MCL (ref 4.2–5.4)
RBC #/AREA URNS AUTO: NORMAL /HPF
RBC UR QL AUTO: NEGATIVE UNIT/L
SP GR UR STRIP.AUTO: 1.01
SQUAMOUS #/AREA URNS AUTO: NORMAL /HPF
UROBILINOGEN UR STRIP-ACNC: 0.2 MG/DL
WBC # SPEC AUTO: 6.8 X10(3)/MCL (ref 4.5–11.5)
WBC #/AREA URNS AUTO: NORMAL /HPF

## 2023-03-16 PROCEDURE — 83036 HEMOGLOBIN GLYCOSYLATED A1C: CPT

## 2023-03-16 PROCEDURE — 81001 URINALYSIS AUTO W/SCOPE: CPT

## 2023-03-16 PROCEDURE — 85025 COMPLETE CBC W/AUTO DIFF WBC: CPT

## 2023-03-16 PROCEDURE — 80053 COMPREHEN METABOLIC PANEL: CPT

## 2023-03-16 PROCEDURE — 82043 UR ALBUMIN QUANTITATIVE: CPT

## 2023-03-16 PROCEDURE — 80061 LIPID PANEL: CPT

## 2023-03-16 PROCEDURE — 36415 COLL VENOUS BLD VENIPUNCTURE: CPT

## 2023-03-17 LAB
ALBUMIN SERPL-MCNC: 3.8 G/DL (ref 3.4–4.8)
ALBUMIN/GLOB SERPL: 1.2 RATIO (ref 1.1–2)
ALP SERPL-CCNC: 75 UNIT/L (ref 40–150)
ALT SERPL-CCNC: 17 UNIT/L (ref 0–55)
AST SERPL-CCNC: 19 UNIT/L (ref 5–34)
BILIRUBIN DIRECT+TOT PNL SERPL-MCNC: 0.5 MG/DL
BUN SERPL-MCNC: 15 MG/DL (ref 9.8–20.1)
CALCIUM SERPL-MCNC: 9.4 MG/DL (ref 8.4–10.2)
CHLORIDE SERPL-SCNC: 104 MMOL/L (ref 98–107)
CHOLEST SERPL-MCNC: 182 MG/DL
CHOLEST/HDLC SERPL: 2 {RATIO} (ref 0–5)
CO2 SERPL-SCNC: 28 MMOL/L (ref 23–31)
CREAT SERPL-MCNC: 0.83 MG/DL (ref 0.55–1.02)
EST. AVERAGE GLUCOSE BLD GHB EST-MCNC: 105.4 MG/DL
GFR SERPLBLD CREATININE-BSD FMLA CKD-EPI: >60 MLS/MIN/1.73/M2
GLOBULIN SER-MCNC: 3.1 GM/DL (ref 2.4–3.5)
GLUCOSE SERPL-MCNC: 115 MG/DL (ref 82–115)
HBA1C MFR BLD: 5.3 %
HDLC SERPL-MCNC: 84 MG/DL (ref 35–60)
LDLC SERPL CALC-MCNC: 79 MG/DL (ref 50–140)
POTASSIUM SERPL-SCNC: 3.6 MMOL/L (ref 3.5–5.1)
PROT SERPL-MCNC: 6.9 GM/DL (ref 5.8–7.6)
SODIUM SERPL-SCNC: 143 MMOL/L (ref 136–145)
TRIGL SERPL-MCNC: 93 MG/DL (ref 37–140)
VLDLC SERPL CALC-MCNC: 19 MG/DL

## 2023-03-22 ENCOUNTER — HOSPITAL ENCOUNTER (OUTPATIENT)
Dept: RADIOLOGY | Facility: HOSPITAL | Age: 63
Discharge: HOME | End: 2023-03-22
Attending: FAMILY MEDICINE
Payer: COMMERCIAL

## 2023-03-22 DIAGNOSIS — M25.512 LEFT SHOULDER PAIN: ICD-10-CM

## 2023-03-22 DIAGNOSIS — M25.512 LEFT SHOULDER PAIN: Primary | ICD-10-CM

## 2023-03-22 PROCEDURE — 73030 X-RAY EXAM OF SHOULDER: CPT | Mod: TC,LT

## 2023-04-27 ENCOUNTER — HOSPITAL ENCOUNTER (OUTPATIENT)
Dept: RADIOLOGY | Facility: HOSPITAL | Age: 63
Discharge: HOME | End: 2023-04-27
Attending: SPECIALIST
Payer: COMMERCIAL

## 2023-04-27 DIAGNOSIS — M25.512 LEFT SHOULDER PAIN: ICD-10-CM

## 2023-04-27 PROCEDURE — 73221 MRI JOINT UPR EXTREM W/O DYE: CPT | Mod: TC,LT

## 2023-06-30 DIAGNOSIS — S46.002D INJURY OF TENDON OF LEFT ROTATOR CUFF, SUBSEQUENT ENCOUNTER: Primary | ICD-10-CM

## 2023-07-03 ENCOUNTER — CLINICAL SUPPORT (OUTPATIENT)
Dept: RESPIRATORY THERAPY | Facility: HOSPITAL | Age: 63
End: 2023-07-03
Attending: SPECIALIST
Payer: COMMERCIAL

## 2023-07-03 ENCOUNTER — PATIENT MESSAGE (OUTPATIENT)
Dept: ADMINISTRATIVE | Facility: OTHER | Age: 63
End: 2023-07-03
Payer: COMMERCIAL

## 2023-07-03 ENCOUNTER — HOSPITAL ENCOUNTER (OUTPATIENT)
Dept: RADIOLOGY | Facility: HOSPITAL | Age: 63
Discharge: HOME | End: 2023-07-03
Attending: SPECIALIST
Payer: COMMERCIAL

## 2023-07-03 DIAGNOSIS — Z01.811 PRE-OP CHEST EXAM: ICD-10-CM

## 2023-07-03 DIAGNOSIS — S46.002D INJURY OF TENDON OF LEFT ROTATOR CUFF, SUBSEQUENT ENCOUNTER: ICD-10-CM

## 2023-07-03 PROCEDURE — 93010 EKG 12-LEAD: ICD-10-PCS | Mod: ,,, | Performed by: INTERNAL MEDICINE

## 2023-07-03 PROCEDURE — 71046 X-RAY EXAM CHEST 2 VIEWS: CPT | Mod: TC

## 2023-07-03 PROCEDURE — 93005 ELECTROCARDIOGRAM TRACING: CPT

## 2023-07-03 PROCEDURE — 93010 ELECTROCARDIOGRAM REPORT: CPT | Mod: ,,, | Performed by: INTERNAL MEDICINE

## 2023-07-03 RX ORDER — NIACINAMIDE 500 MG
1 TABLET ORAL 2 TIMES DAILY
COMMUNITY
Start: 2023-03-30

## 2023-07-03 RX ORDER — NIACIN 500 MG
250 CAPSULE, EXTENDED RELEASE ORAL 2 TIMES DAILY
COMMUNITY

## 2023-07-03 RX ORDER — CHOLECALCIFEROL (VITAMIN D3) 125 MCG
5000 CAPSULE ORAL EVERY MORNING
COMMUNITY

## 2023-07-03 RX ORDER — ALBUTEROL SULFATE 90 UG/1
2 AEROSOL, METERED RESPIRATORY (INHALATION) EVERY 4 HOURS PRN
COMMUNITY
Start: 2023-04-24

## 2023-07-03 RX ORDER — ACETYLGLUCOSAMINE
600 POWDER (GRAM) MISCELLANEOUS 2 TIMES DAILY
COMMUNITY

## 2023-07-03 NOTE — DISCHARGE INSTRUCTIONS
Nothing to eat or drink after midnight. Wound      WOUND CARE  INSTRUCTIONS     Tavo Jerome M.D.     Orthopedic Surgery/Sports Medicine     [address] [facility], [place] [address]     [place] [id number]     Office: [phone]     REMOVE BANDAGE FROM SURGERY SITE 2 DAYS AFTER SURGERY UNLESS INSTRUCTED OTHERWISE.    2. YOU MUST KEEP A STERILE BANDAGE OR GAUZE OVER SURGERY SITE UNTIL THERE IS NO DRAINAGE FROM WOUND.    3. WASH INCISION WITH ANTIBACTERIAL SOAP AND WATER DAILY.    4. DO NOT SOAK INCISION IN BATHTUB.    5. REFRAIN FROM USING HYDROGEN PEROXIDE OR ANTIBIOTIC OINTMENT UNLESS INSTRUCTED TO DO SO.    6. CALL DR JEROME OR NOTIFY STAFF IN CASE OF FEVER-GREATER THAN 101.5,   EXCESSIVE DRAINAGE FROM SURGERY SITE, REDNESS OR SEVERE PAIN.

## 2023-07-05 ENCOUNTER — ANESTHESIA EVENT (OUTPATIENT)
Dept: SURGERY | Facility: HOSPITAL | Age: 63
End: 2023-07-05
Payer: COMMERCIAL

## 2023-07-05 NOTE — ANESTHESIA PREPROCEDURE EVALUATION
07/05/2023  Dayana Mejia is a 62 y.o., female.      Pre-op Assessment    I have reviewed the Patient Summary Reports.     I have reviewed the Nursing Notes. I have reviewed the NPO Status.   I have reviewed the Medications.     Review of Systems  Anesthesia Hx:  Denies Family Hx of Anesthesia complications.   Denies Personal Hx of Anesthesia complications.   Social:  Smoker, Alcohol Use    Hematology/Oncology:  Hematology Normal   Oncology Normal     EENT/Dental:EENT/Dental Normal   Cardiovascular:   Hypertension, well controlled ECG has been reviewed.    Pulmonary:   Asthma moderate    Renal/:  Renal/ Normal     Hepatic/GI:  Hepatic/GI Normal    Musculoskeletal:   Arthritis     Neurological:   Neuromuscular Disease, Headaches    Endocrine:   Diabetes, well controlled, type 2        Physical Exam  General: Cooperative, Alert and Oriented    Airway:  Mallampati: II   Mouth Opening: Normal  TM Distance: Normal  Tongue: Normal  Neck ROM: Normal ROM    Dental:  Intact        Anesthesia Plan  Type of Anesthesia, risks & benefits discussed:    Anesthesia Type: Gen ETT, Regional  Intra-op Monitoring Plan: Standard ASA Monitors  Post Op Pain Control Plan: multimodal analgesia  Induction:  IV  Airway Plan: Direct  Informed Consent: Informed consent signed with the Patient and all parties understand the risks and agree with anesthesia plan.  All questions answered. Patient consented to blood products? Yes  ASA Score: 3    Ready For Surgery From Anesthesia Perspective.     .

## 2023-07-06 ENCOUNTER — ANESTHESIA (OUTPATIENT)
Dept: SURGERY | Facility: HOSPITAL | Age: 63
End: 2023-07-06
Payer: COMMERCIAL

## 2023-07-06 ENCOUNTER — HOSPITAL ENCOUNTER (OUTPATIENT)
Facility: HOSPITAL | Age: 63
Discharge: HOME | End: 2023-07-06
Attending: SPECIALIST | Admitting: SPECIALIST
Payer: COMMERCIAL

## 2023-07-06 DIAGNOSIS — M75.102 ROTATOR CUFF TEAR ARTHROPATHY OF LEFT SHOULDER: ICD-10-CM

## 2023-07-06 DIAGNOSIS — M12.812 ROTATOR CUFF TEAR ARTHROPATHY OF LEFT SHOULDER: ICD-10-CM

## 2023-07-06 DIAGNOSIS — M75.112 NONTRAUMATIC INCOMPLETE TEAR OF LEFT ROTATOR CUFF: Primary | ICD-10-CM

## 2023-07-06 PROBLEM — S43.439A: Status: ACTIVE | Noted: 2023-07-06

## 2023-07-06 PROBLEM — M75.52 CHRONIC SHOULDER BURSITIS, LEFT: Status: ACTIVE | Noted: 2023-07-06

## 2023-07-06 LAB
POCT GLUCOSE: 102 MG/DL (ref 70–110)
POCT GLUCOSE: 136 MG/DL (ref 70–110)

## 2023-07-06 PROCEDURE — 25000003 PHARM REV CODE 250: Performed by: NURSE ANESTHETIST, CERTIFIED REGISTERED

## 2023-07-06 PROCEDURE — 63600175 PHARM REV CODE 636 W HCPCS: Performed by: SPECIALIST

## 2023-07-06 PROCEDURE — 71000015 HC POSTOP RECOV 1ST HR: Performed by: SPECIALIST

## 2023-07-06 PROCEDURE — 36000710: Performed by: SPECIALIST

## 2023-07-06 PROCEDURE — 37000008 HC ANESTHESIA 1ST 15 MINUTES: Performed by: SPECIALIST

## 2023-07-06 PROCEDURE — 71000033 HC RECOVERY, INTIAL HOUR: Performed by: SPECIALIST

## 2023-07-06 PROCEDURE — 63600175 PHARM REV CODE 636 W HCPCS

## 2023-07-06 PROCEDURE — 36000711: Performed by: SPECIALIST

## 2023-07-06 PROCEDURE — 63600175 PHARM REV CODE 636 W HCPCS: Performed by: ANESTHESIOLOGY

## 2023-07-06 PROCEDURE — 71000016 HC POSTOP RECOV ADDL HR: Performed by: SPECIALIST

## 2023-07-06 PROCEDURE — D9220A PRA ANESTHESIA: ICD-10-PCS | Mod: ,,, | Performed by: NURSE ANESTHETIST, CERTIFIED REGISTERED

## 2023-07-06 PROCEDURE — 63600175 PHARM REV CODE 636 W HCPCS: Performed by: NURSE ANESTHETIST, CERTIFIED REGISTERED

## 2023-07-06 PROCEDURE — 37000009 HC ANESTHESIA EA ADD 15 MINS: Performed by: SPECIALIST

## 2023-07-06 PROCEDURE — D9220A PRA ANESTHESIA: Mod: ,,, | Performed by: NURSE ANESTHETIST, CERTIFIED REGISTERED

## 2023-07-06 RX ORDER — ONDANSETRON 4 MG/1
8 TABLET, ORALLY DISINTEGRATING ORAL EVERY 8 HOURS PRN
Status: DISCONTINUED | OUTPATIENT
Start: 2023-07-06 | End: 2023-07-06 | Stop reason: HOSPADM

## 2023-07-06 RX ORDER — SODIUM CHLORIDE, SODIUM LACTATE, POTASSIUM CHLORIDE, CALCIUM CHLORIDE 600; 310; 30; 20 MG/100ML; MG/100ML; MG/100ML; MG/100ML
INJECTION, SOLUTION INTRAVENOUS CONTINUOUS
Status: ACTIVE | OUTPATIENT
Start: 2023-07-06

## 2023-07-06 RX ORDER — FENTANYL CITRATE 50 UG/ML
25 INJECTION, SOLUTION INTRAMUSCULAR; INTRAVENOUS EVERY 5 MIN PRN
Status: ACTIVE | OUTPATIENT
Start: 2023-07-06

## 2023-07-06 RX ORDER — OXYCODONE HYDROCHLORIDE 5 MG/1
10 TABLET ORAL EVERY 6 HOURS PRN
Status: DISCONTINUED | OUTPATIENT
Start: 2023-07-06 | End: 2023-07-06 | Stop reason: HOSPADM

## 2023-07-06 RX ORDER — CEFAZOLIN SODIUM 1 G/3ML
INJECTION, POWDER, FOR SOLUTION INTRAMUSCULAR; INTRAVENOUS
Status: DISCONTINUED | OUTPATIENT
Start: 2023-07-06 | End: 2023-07-06

## 2023-07-06 RX ORDER — ASPIRIN 81 MG/1
81 TABLET ORAL DAILY
Qty: 42 TABLET | Refills: 0 | Status: SHIPPED | OUTPATIENT
Start: 2023-07-06 | End: 2023-08-30

## 2023-07-06 RX ORDER — TRIAMCINOLONE ACETONIDE 40 MG/ML
INJECTION, SUSPENSION INTRA-ARTICULAR; INTRAMUSCULAR
Status: DISCONTINUED | OUTPATIENT
Start: 2023-07-06 | End: 2023-07-06 | Stop reason: HOSPADM

## 2023-07-06 RX ORDER — BUPIVACAINE HYDROCHLORIDE 2.5 MG/ML
INJECTION, SOLUTION EPIDURAL; INFILTRATION; INTRACAUDAL
Status: DISCONTINUED | OUTPATIENT
Start: 2023-07-06 | End: 2023-07-06 | Stop reason: HOSPADM

## 2023-07-06 RX ORDER — ONDANSETRON 2 MG/ML
INJECTION INTRAMUSCULAR; INTRAVENOUS
Status: DISCONTINUED | OUTPATIENT
Start: 2023-07-06 | End: 2023-07-06

## 2023-07-06 RX ORDER — LIDOCAINE HYDROCHLORIDE 20 MG/ML
INJECTION INTRAVENOUS
Status: DISCONTINUED | OUTPATIENT
Start: 2023-07-06 | End: 2023-07-06

## 2023-07-06 RX ORDER — MORPHINE SULFATE 4 MG/ML
4 INJECTION, SOLUTION INTRAMUSCULAR; INTRAVENOUS EVERY 4 HOURS PRN
Status: DISCONTINUED | OUTPATIENT
Start: 2023-07-06 | End: 2023-07-06 | Stop reason: HOSPADM

## 2023-07-06 RX ORDER — SODIUM CHLORIDE 0.9 % (FLUSH) 0.9 %
10 SYRINGE (ML) INJECTION
Status: ACTIVE | OUTPATIENT
Start: 2023-07-06

## 2023-07-06 RX ORDER — EPINEPHRINE 1 MG/ML
INJECTION, SOLUTION, CONCENTRATE INTRAVENOUS
Status: DISCONTINUED | OUTPATIENT
Start: 2023-07-06 | End: 2023-07-06 | Stop reason: HOSPADM

## 2023-07-06 RX ORDER — DEXAMETHASONE SODIUM PHOSPHATE 4 MG/ML
INJECTION, SOLUTION INTRA-ARTICULAR; INTRALESIONAL; INTRAMUSCULAR; INTRAVENOUS; SOFT TISSUE
Status: DISCONTINUED | OUTPATIENT
Start: 2023-07-06 | End: 2023-07-06

## 2023-07-06 RX ORDER — ONDANSETRON 8 MG/1
8 TABLET, ORALLY DISINTEGRATING ORAL 3 TIMES DAILY PRN
Qty: 15 TABLET | Refills: 0 | Status: SHIPPED | OUTPATIENT
Start: 2023-07-06 | End: 2023-08-30

## 2023-07-06 RX ORDER — TRAMADOL HYDROCHLORIDE 50 MG/1
50 TABLET ORAL EVERY 6 HOURS PRN
Status: DISCONTINUED | OUTPATIENT
Start: 2023-07-06 | End: 2023-07-06 | Stop reason: HOSPADM

## 2023-07-06 RX ORDER — HYDROCODONE BITARTRATE AND ACETAMINOPHEN 10; 325 MG/1; MG/1
1 TABLET ORAL EVERY 6 HOURS PRN
Qty: 28 TABLET | Refills: 0 | Status: SHIPPED | OUTPATIENT
Start: 2023-07-06 | End: 2023-08-30

## 2023-07-06 RX ORDER — ESMOLOL HYDROCHLORIDE 10 MG/ML
INJECTION INTRAVENOUS
Status: DISCONTINUED | OUTPATIENT
Start: 2023-07-06 | End: 2023-07-06

## 2023-07-06 RX ORDER — HYDROMORPHONE HYDROCHLORIDE 1 MG/ML
INJECTION, SOLUTION INTRAMUSCULAR; INTRAVENOUS; SUBCUTANEOUS
Status: DISCONTINUED | OUTPATIENT
Start: 2023-07-06 | End: 2023-07-06

## 2023-07-06 RX ORDER — ONDANSETRON 2 MG/ML
4 INJECTION INTRAMUSCULAR; INTRAVENOUS EVERY 12 HOURS PRN
Status: DISCONTINUED | OUTPATIENT
Start: 2023-07-06 | End: 2023-07-06 | Stop reason: HOSPADM

## 2023-07-06 RX ORDER — PROPOFOL 10 MG/ML
VIAL (ML) INTRAVENOUS
Status: DISCONTINUED | OUTPATIENT
Start: 2023-07-06 | End: 2023-07-06

## 2023-07-06 RX ORDER — CEFADROXIL 500 MG/1
500 CAPSULE ORAL EVERY 12 HOURS
Qty: 20 CAPSULE | Refills: 0 | Status: SHIPPED | OUTPATIENT
Start: 2023-07-06 | End: 2023-07-16

## 2023-07-06 RX ORDER — FENTANYL CITRATE 50 UG/ML
INJECTION, SOLUTION INTRAMUSCULAR; INTRAVENOUS
Status: DISCONTINUED | OUTPATIENT
Start: 2023-07-06 | End: 2023-07-06

## 2023-07-06 RX ORDER — CEFAZOLIN SODIUM 2 G/50ML
2 SOLUTION INTRAVENOUS
Status: COMPLETED | OUTPATIENT
Start: 2023-07-06 | End: 2023-07-06

## 2023-07-06 RX ADMIN — SODIUM CHLORIDE, POTASSIUM CHLORIDE, SODIUM LACTATE AND CALCIUM CHLORIDE: 600; 310; 30; 20 INJECTION, SOLUTION INTRAVENOUS at 08:07

## 2023-07-06 RX ADMIN — DEXAMETHASONE SODIUM PHOSPHATE 4 MG: 4 INJECTION, SOLUTION INTRA-ARTICULAR; INTRALESIONAL; INTRAMUSCULAR; INTRAVENOUS; SOFT TISSUE at 08:07

## 2023-07-06 RX ADMIN — ESMOLOL HYDROCHLORIDE 20 MG: 100 INJECTION, SOLUTION INTRAVENOUS at 09:07

## 2023-07-06 RX ADMIN — FENTANYL CITRATE 25 MCG: 50 INJECTION, SOLUTION INTRAMUSCULAR; INTRAVENOUS at 09:07

## 2023-07-06 RX ADMIN — CEFAZOLIN SODIUM 2 G: 2 SOLUTION INTRAVENOUS at 08:07

## 2023-07-06 RX ADMIN — PROPOFOL 100 MG: 10 INJECTION, EMULSION INTRAVENOUS at 08:07

## 2023-07-06 RX ADMIN — LIDOCAINE HYDROCHLORIDE 60 MG: 20 INJECTION, SOLUTION INTRAVENOUS at 08:07

## 2023-07-06 RX ADMIN — HYDROMORPHONE HYDROCHLORIDE 0.5 MG: 1 INJECTION, SOLUTION INTRAMUSCULAR; INTRAVENOUS; SUBCUTANEOUS at 09:07

## 2023-07-06 RX ADMIN — FENTANYL CITRATE 25 MCG: 50 INJECTION, SOLUTION INTRAMUSCULAR; INTRAVENOUS at 08:07

## 2023-07-06 RX ADMIN — HYDROMORPHONE HYDROCHLORIDE 1 MG: 1 INJECTION, SOLUTION INTRAMUSCULAR; INTRAVENOUS; SUBCUTANEOUS at 09:07

## 2023-07-06 RX ADMIN — ONDANSETRON 4 MG: 2 INJECTION INTRAMUSCULAR; INTRAVENOUS at 08:07

## 2023-07-06 NOTE — DISCHARGE SUMMARY
[facility] - Periop Services  Discharge Note  Short Stay    Procedure(s) (LRB):  REPAIR, ROTATOR CUFF, ARTHROSCOPIC (Left)      OUTCOME: Patient tolerated treatment/procedure well without complication and is now ready for discharge.    DISPOSITION: Home or Self Care    FINAL DIAGNOSIS:  Incomplete tear of left rotator cuff    FOLLOWUP: In clinic    DISCHARGE INSTRUCTIONS:    Discharge Procedure Orders   Diet general     Ice to affected area   Order Comments: using barrier between ice and skin (specify duration&frequency)     No driving, operating heavy equipment or signing legal documents while taking pain medication     Remove dressing in 72 hours   Order Comments: Replace with band aids or other dry dressing     Call MD for:  temperature >100.4     Call MD for:  persistent nausea and vomiting     Call MD for:  severe uncontrolled pain     Call MD for:  difficulty breathing, headache or visual disturbances     Call MD for:  redness, tenderness, or signs of infection (pain, swelling, redness, odor or green/yellow discharge around incision site)     Call MD for:  hives     Call MD for:  persistent dizziness or light-headedness     Call MD for:  extreme fatigue     Other restrictions (specify):   Order Comments: Leave shoulder sling in place for 3 days, May change dressing in 3 days, replace with band aids/dry dressing.        TIME SPENT ON DISCHARGE: 25 minutes

## 2023-07-06 NOTE — OP NOTE
Operative note     Date: 7/6/2023     Preop diagnosis:  Left shoulder partial rotator cuff tear    Postop diagnosis:  Partial bursal sided rotator cuff tear, degenerative labral tear  Procedure:  Arthroscopic bursectomy, cuff debridement, labral debridement, biceps release    Surgeon: Tavo Villalta M.D.    Assistant: PACO Mcfadden    Anesthesia:  General    Complications: none    Blood loss: nil    Procedure in detail:  Informed consent was obtained.  Risks of the procedure was explained not excluding infection, bleeding, pain, scarring, neurovascular injury, need for future surgery.  This is a 62-year-old female with symptomatic shoulder pain for many months.  Her MRI indicated a partial bursal sided cuff tear.  She was brought to the OR given IV antibiotics and general anesthesia.  Inflow was established and the shoulder was examined arthroscopically.  The intra-articular side of the cuff was pristine with no tearing.  She did have a degenerative SLAP tear.  The biceps was released the labrum was minimally debrided.  She would no evidence of instability.  I went on the top side did a bursectomy and carefully probe the partial cuff I did a very light debridement there.  Scope was withdrawn portals were closed with nylon shoulder was injected sterile dressing applied no complications.    Tavo Villalta M.D.

## 2023-07-06 NOTE — ANESTHESIA PROCEDURE NOTES
Intubation    Date/Time: 7/6/2023 8:46 AM  Performed by: Jaquan Stark CRNA  Authorized by: José Luis Morrison DO     Intubation:     Induction:  Intravenous    Intubated:  Postinduction    Mask Ventilation:  Easy mask    Attempts:  1    Attempted By:  CRNA    Difficult Airway Encountered?: No      Complications:  None    Airway Device:  Supraglottic airway/LMA    Airway Device Size:  4.0    Placement Verified By:  Capnometry    Complicating Factors:  None    Findings Post-Intubation:  BS equal bilateral and atraumatic/condition of teeth unchanged

## 2023-07-06 NOTE — TRANSFER OF CARE
Anesthesia Transfer of Care Note    Patient: Dayana Mejia    Procedure(s) Performed: Procedure(s) (LRB):  REPAIR, ROTATOR CUFF, ARTHROSCOPIC (Left)    Patient location: PACU    Anesthesia Type: general    Transport from OR: Transported from OR on room air with adequate spontaneous ventilation    Post pain: adequate analgesia    Post assessment: no apparent anesthetic complications    Post vital signs: stable    Level of consciousness: sedated    Nausea/Vomiting: no nausea/vomiting    Complications: none    Transfer of care protocol was followed      Last vitals:   Visit Vitals  /80   Pulse 87   Temp 36.9 °C (98.4 °F) (Oral)   Wt 67.6 kg (149 lb)   LMP  (LMP Unknown)   SpO2 96%   Breastfeeding No   BMI 24.79 kg/m²

## 2023-07-06 NOTE — PATIENT INSTRUCTIONS
Leave sling in place for 3 days.  May remove for bathing although she needs to keep the incisions dry.  May remove the sling 2-3 times daily for gentle range of motion of the elbow wrist and hand and gentle Codman exercises

## 2023-07-06 NOTE — ANESTHESIA POSTPROCEDURE EVALUATION
Anesthesia Post Evaluation    Patient: Dayana Mejia    Procedure(s) Performed: Procedure(s) (LRB):  REPAIR, ROTATOR CUFF, ARTHROSCOPIC (Left)    Final Anesthesia Type: general      Patient location during evaluation: PACU  Patient participation: Yes- Able to Participate  Level of consciousness: awake and alert and oriented  Post-procedure vital signs: reviewed and stable  Pain management: adequate  Airway patency: patent    PONV status at discharge: No PONV  Anesthetic complications: no      Cardiovascular status: stable  Respiratory status: unassisted, spontaneous ventilation and room air  Hydration status: euvolemic  Follow-up not needed.          Vitals Value Taken Time   /68 07/06/23 0945   Temp  07/06/23 0946   Pulse 110 07/06/23 0946   Resp 23 07/06/23 0946   SpO2 93 % 07/06/23 0946   Vitals shown include unvalidated device data.      No case tracking events are documented in the log.      Pain/Alfie Score: No data recorded

## 2023-07-07 VITALS
WEIGHT: 149 LBS | DIASTOLIC BLOOD PRESSURE: 79 MMHG | OXYGEN SATURATION: 96 % | BODY MASS INDEX: 24.79 KG/M2 | HEART RATE: 92 BPM | SYSTOLIC BLOOD PRESSURE: 120 MMHG | TEMPERATURE: 97 F | RESPIRATION RATE: 12 BRPM

## 2023-07-20 DIAGNOSIS — S46.002D UNSPECIFIED INJURY OF MUSCLE(S) AND TENDON(S) OF THE ROTATOR CUFF OF LEFT SHOULDER, SUBSEQUENT ENCOUNTER: Primary | ICD-10-CM

## 2023-07-25 ENCOUNTER — CLINICAL SUPPORT (OUTPATIENT)
Dept: REHABILITATION | Facility: HOSPITAL | Age: 63
End: 2023-07-25
Payer: COMMERCIAL

## 2023-07-25 DIAGNOSIS — Z74.09 IMPAIRED FUNCTIONAL MOBILITY AND ACTIVITY TOLERANCE: ICD-10-CM

## 2023-07-25 DIAGNOSIS — M25.512 LEFT SHOULDER PAIN, UNSPECIFIED CHRONICITY: Primary | ICD-10-CM

## 2023-07-25 PROCEDURE — 97163 PT EVAL HIGH COMPLEX 45 MIN: CPT

## 2023-07-25 PROCEDURE — 97110 THERAPEUTIC EXERCISES: CPT

## 2023-07-25 NOTE — PROGRESS NOTES
[facility]   Physical Therapy Initial Evaluation     Date: 7/25/2023   Name: Dayana Mejia  Clinic Number: [id number]    Therapy Diagnosis:   Encounter Diagnoses   Name Primary?    Left shoulder pain, unspecified chronicity Yes    Impaired functional mobility and activity tolerance      Physician: Tavo Villalta MD    Physician Orders: PT Eval and Treat  Medical Diagnosis from Referral: Unspecific injury of muscle(s) and tendon(s) of the left shoulder rotator cuff  Evaluation Date: 7/25/2023  Authorization Period Expiration: Medically necessary  Plan of Care Expiration: 11/25/2023  Visit # / Visits authorized: 0/ 36    Time In: 1105  Time Out: 1130  Total Billable Time: 25 minutes    Surgery: Left shoulder arthroscopic bursectomy, rotator cuff debridement, labral debridement, biceps release 7/6/23  Orthopedic Precautions: Patient states MD has left progression to PT discretion  Pertinent History: See PMH in chart    SUBJECTIVE     History of current condition - Dayana reports: Following surgery was wearing sling for 1 week as directed by MD. She is mostly left hand dominant thus having a difficult time adjusting to ADL performance which she now does majority with her right arm. Pain in left shoulder/arm is significant when she uses the left arm, as high as 8/10 when she doesn't take her pain medication. She notes she gets knots in her left bicep belly. When she rests the left arm, pain is decreased. States her pain is at posterior portal sight and anterolateral shoulder down to distal upper arm. She takes her pain medication as prescribed. She states her only precautions from MD included sling use (as above) and no lifting until she got to PT; it is per PT to set precautions and progress based on findings/discretion.      Medical History:   Past Medical History:   Diagnosis Date    Anxiety     Asthma     Cervical spondylosis with myelopathy     Cervical spondylosis with radiculopathy      Chronic shoulder bursitis, left 7/6/2023    Decreased shoulder mobility, left     Degenerative superior labral anterior-to-posterior (SLAP) tear of shoulder 7/6/2023    DM (diabetes mellitus)     Dyslipidemia     Granuloma annulare     History of DVT (deep vein thrombosis) 2015    History of renal cell carcinoma     HTN (hypertension)     IBS (irritable bowel syndrome)     Incomplete tear of left rotator cuff 7/6/2023    Left arm pain     Left arm weakness     Left shoulder pain     Migraine     Neuropathy, arm, left     OA (osteoarthritis)     Type 2 diabetes mellitus without complications        Surgical History:   Dayana Mejia  has a past surgical history that includes Left C5-6, C6-7 foraminotomies (06/20/2019); Laparoscopic nephrectomy (Left, 05/05/2017); Breast mass excision (Left, 2012); Cholecystectomy (1987); Hernia repair (1987); Colonoscopy; Anterior cervical discectomy w/ fusion (N/A, 8/2/2022); Arthroscopic repair of rotator cuff of shoulder (Left, 7/6/2023); Arthroscopic debridement of rotator cuff (Left, 7/6/2023); and Excision of bursa (Left, 7/6/2023).    Medications:   Dayana has a current medication list which includes the following prescription(s): n-acetyl-alpha-d-glucosamine, albuterol, allopurinol, amitiza, ascorbic acid (vitamin c), aspirin, azelastine, biotin, calcium carb/magnesium oxid/d3, cetirizine, cholecalciferol (vitamin d3), famotidine/ca carb/mag hydrox, gabapentin, hydrocodone-acetaminophen, metformin, methocarbamol, niacin, niacinamide, NON FORMULARY MEDICATION, ondansetron, raloxifene, rosuvastatin, symbicort, tamsulosin, temazepam, triamterene-hydrochlorothiazide 37.5-25 mg, and vascepa, and the following Facility-Administered Medications: fentanyl, lactated ringers, and sodium chloride 0.9%.    Allergies:   Review of patient's allergies indicates:   Allergen Reactions    Codeine Nausea And Vomiting     Other reaction(s): Vomiting (disorder)            CMS  Impairment/Limitation/Restriction for FOTO Survey  Therapist reviewed FOTO scores for Dayana Mejia on 7/25/2023. FOTO documents entered into [employer] - see Media section.  Patient's Physical FS Primary Measure: 35  Risk Adjusted Statistical FOTO: 25  Limitation Score: 65%  Category: Carrying  DASH: 73.3% disability    OBJECTIVE        Posture    Mild/moderate rounded shoulders, forward head     Palpation    Left - tender to mild palpatory pressure anterolateral shoulder, biceps belly, posterior shoulder at port site       AROM    RUE shoulder, elbow, wrist full and painless  LUE  --flx 100 deg - pain at end range  --abd 80 deg (palm down), 90 deg (palm fwd) - pain at end range  --pronation/supination full and painless       PROM    Left  --flx 140 deg       MMT    Shoulder  Right - Flx 5/5, Abd 5/5, ER 5/5, IR 5/5  Left - Flx 2+/5, Abd 2+/5, ER 3/5, IR 3/5    Elbow  Right - Flx 5/5, Ext 5/5  Left - Flx 3/5, Ext 3+/5       Other         TREATMENT     Dayana received the treatments listed below:       Time Activities   Manual     TherAct     TherEx 15 min HEP, PT-resisted submax isometric left shoulder (ER, IR, horizontal row)   Gait     Neuro Re-ed     Modalities     E-Stim     Dry Needling     Canalith Repositioning         Home Exercises and Patient Education Provided    Education provided:   -Plan of care, HEP    Written Home Exercises Provided: yes.  Exercises were reviewed and Dayana was able to demonstrate them prior to the end of the session.  Dayana demonstrated good  understanding of the education provided.     See EMR under Media for exercises provided 7/25/2023.    ASSESSMENT     Dayana is a 62 y.o. female referred to outpatient Physical Therapy with a medical diagnosis of Unspecific injury of muscle(s) and tendon(s) of the left shoulder rotator cuff. Patient presents with impaired functional mobility and activity tolerance. She is at increased risk of RUE overuse and subsequent injury. Patient will benefit from skilled  outpatient Physical Therapy to address the deficits stated above and in the chart below, provide education, and to maximize patient's level of independence.    Patient prognosis is Good.     Plan of care discussed with patient: Yes  Patient's spiritual, cultural and educational needs considered and patient is agreeable to the plan of care and goals as stated below:    Anticipated Barriers for therapy: None    Goals:  Short Term Goals: 6 weeks   Patient will report at least 10% increase in functional capacity from initial QuickDash score to indicate clinically significant functional improvement  Patient will report at least 10 point increase on initial FOTO score to indicate clinically significant functional improvement  Patient will demonstrate full left shoulder active flexion and abduction in order to improve her functional capacity      Long Term Goals: 12 weeks   Patient will report at least 20% increase in functional capacity from initial QuickDash score to indicate clinically significant functional improvement  Patient will report at least 20 point increase on initial FOTO score to indicate clinically significant functional improvement  Patient will demonstrate 5/5 strength all left shoulder motions to improve her functional strength during ADL performance      PLAN   Plan of care Certification: 7/25/2023 to 11/25/2023.    Outpatient Physical Therapy 2-3 times weekly for 12 weeks to include the following interventions: Manual Therapy, Moist Heat/ Ice, Neuromuscular Re-ed, Patient Education, Self Care, Therapeutic Activities, and Therapeutic Exercise.     Angelica Thomas, PT

## 2023-07-27 ENCOUNTER — CLINICAL SUPPORT (OUTPATIENT)
Dept: REHABILITATION | Facility: HOSPITAL | Age: 63
End: 2023-07-27
Payer: COMMERCIAL

## 2023-07-27 DIAGNOSIS — Z74.09 IMPAIRED FUNCTIONAL MOBILITY AND ACTIVITY TOLERANCE: ICD-10-CM

## 2023-07-27 DIAGNOSIS — M25.512 LEFT SHOULDER PAIN, UNSPECIFIED CHRONICITY: Primary | ICD-10-CM

## 2023-07-27 PROCEDURE — 97110 THERAPEUTIC EXERCISES: CPT

## 2023-07-27 PROCEDURE — 97140 MANUAL THERAPY 1/> REGIONS: CPT

## 2023-07-27 NOTE — PLAN OF CARE
Physical Therapy Treatment Note     Name: Dayana Mejia  Clinic Number: [id number]    Therapy Diagnosis:   Encounter Diagnoses   Name Primary?    Left shoulder pain, unspecified chronicity Yes    Impaired functional mobility and activity tolerance      Physician: Tavo Villalta MD    Visit Date: 7/27/2023    Physician Orders: PT Eval and Treat  Medical Diagnosis from Referral: Unspecific injury of muscle(s) and tendon(s) of the left shoulder rotator cuff  Evaluation Date: 7/25/2023  Authorization Period Expiration: Medically necessary  Plan of Care Expiration: 11/25/2023  Visit # / Visits authorized: 1/ 36     Time In: 1508  Time Out: 1546  Total Billable Time: 38 minutes     Surgery: Left shoulder arthroscopic bursectomy, rotator cuff debridement, labral debridement, biceps release 7/6/23  Orthopedic Precautions: Patient states MD has left progression to PT discretion  Pertinent History: See PMH in chart    Subjective     Patient reports: Did HEP, having left shoulder pain.    CMS Impairment/Limitation/Restriction for FOTO Survey  Therapist reviewed FOTO scores for Dayana Mejia on 7/25/2023. FOTO documents entered into [employer] - see Media section.  Patient's Physical FS Primary Measure: 35  Risk Adjusted Statistical FOTO: 25  Limitation Score: 65%  Category: Carrying  DASH: 73.3% disability    Objective     Dayana received the following treatment:     Time Activities   Manual 8 min PROM left shoulder flexion/scaption, passive left pec stretch, STM to left scapular muscles    Biofreeze to left scapula and shoulder       TherAct     TherEx 30 min BFRT - red cuff @ 320mmHg on proximal left humerus  --sidelying ER (30, 15x3)  --supine IR YTB (4x10)  --prone ext (20, 12x3)  --prone rows (20, 12x3)      -Wall towel wipes     Gait     Neuro Re-ed     Modalities     E-Stim     Dry Needling     Canalith Repositioning           Home Exercises Provided and Patient Education Provided     Education provided:   -Plan of care,  HEP    Assessment     Pre-treatment scaption to ~110 deg, much improved compared previous session (eval) of ~80-85 deg. PROM left scaption/flexion to ~160 deg. Wall towel wipe to ~170 deg. Strengthening and stabilization to left shoulder/scapular complex.    Patient prognosis is Good.      Anticipated barriers to physical therapy: None    Goals: Dayana is working towards her goals.  Short Term Goals: 6 weeks   Patient will report at least 10% increase in functional capacity from initial QuickDash score to indicate clinically significant functional improvement  Patient will report at least 10 point increase on initial FOTO score to indicate clinically significant functional improvement  Patient will demonstrate full left shoulder active flexion and abduction in order to improve her functional capacity        Long Term Goals: 12 weeks   Patient will report at least 20% increase in functional capacity from initial QuickDash score to indicate clinically significant functional improvement  Patient will report at least 20 point increase on initial FOTO score to indicate clinically significant functional improvement  Patient will demonstrate 5/5 strength all left shoulder motions to improve her functional strength during ADL performance    Plan     2-3x/week x 12 weeks    Angelica Thomas, PT

## 2023-07-27 NOTE — PROGRESS NOTES
Physical Therapy Treatment Note     Name: Dayana Mejia  Clinic Number: [id number]    Therapy Diagnosis:   Encounter Diagnoses   Name Primary?    Left shoulder pain, unspecified chronicity Yes    Impaired functional mobility and activity tolerance      Physician: Tavo Villalta MD    Visit Date: 7/27/2023    Physician Orders: PT Eval and Treat  Medical Diagnosis from Referral: Unspecific injury of muscle(s) and tendon(s) of the left shoulder rotator cuff  Evaluation Date: 7/25/2023  Authorization Period Expiration: Medically necessary  Plan of Care Expiration: 11/25/2023  Visit # / Visits authorized: 1/ 36     Time In: 1508  Time Out: 15***  Total Billable Time: 25 minutes     Surgery: Left shoulder arthroscopic bursectomy, rotator cuff debridement, labral debridement, biceps release 7/6/23  Orthopedic Precautions: Patient states MD has left progression to PT discretion  Pertinent History: See PMH in chart    Subjective     Patient reports: ***.    CMS Impairment/Limitation/Restriction for FOTO Survey  Therapist reviewed FOTO scores for Dayana Mejia on 7/25/2023. FOTO documents entered into [employer] - see Media section.  Patient's Physical FS Primary Measure: 35  Risk Adjusted Statistical FOTO: 25  Limitation Score: 65%  Category: Carrying  DASH: 73.3% disability    Objective     Dayana received the following treatment:     Time Activities   Manual *** min    TherAct *** min    TherEx *** min    Gait     Neuro Re-ed     Modalities     E-Stim     Dry Needling     Canalith Repositioning           Home Exercises Provided and Patient Education Provided     Education provided:   -Plan of care, HEP, ***    Assessment     ***    Patient prognosis is Good.      Anticipated barriers to physical therapy: None    Goals: Dayana is working towards her goals.  Short Term Goals: 6 weeks   Patient will report at least 10% increase in functional capacity from initial QuickDash score to indicate clinically significant functional  improvement  Patient will report at least 10 point increase on initial FOTO score to indicate clinically significant functional improvement  Patient will demonstrate full left shoulder active flexion and abduction in order to improve her functional capacity        Long Term Goals: 12 weeks   Patient will report at least 20% increase in functional capacity from initial QuickDash score to indicate clinically significant functional improvement  Patient will report at least 20 point increase on initial FOTO score to indicate clinically significant functional improvement  Patient will demonstrate 5/5 strength all left shoulder motions to improve her functional strength during ADL performance    Plan     2-3x/week x 12 weeks    Angelica Thomas, PT

## 2023-07-31 ENCOUNTER — TELEPHONE (OUTPATIENT)
Dept: NEUROSURGERY | Facility: CLINIC | Age: 63
End: 2023-07-31
Payer: COMMERCIAL

## 2023-07-31 NOTE — TELEPHONE ENCOUNTER
I called the patient and she requested to r/s this appt until she has gained more use of her arm. She requested the week of 8/28 so she I currently scheduled on 8/30/23 wMia Kendall. Her XR is scheduled on 8/24 and she will be doing that and her Vit D lab at the [facility] [place].She verbalized understanding to all of these scheduling changes.

## 2023-08-01 ENCOUNTER — CLINICAL SUPPORT (OUTPATIENT)
Dept: REHABILITATION | Facility: HOSPITAL | Age: 63
End: 2023-08-01
Payer: COMMERCIAL

## 2023-08-01 DIAGNOSIS — M25.512 LEFT SHOULDER PAIN, UNSPECIFIED CHRONICITY: Primary | ICD-10-CM

## 2023-08-01 DIAGNOSIS — Z74.09 IMPAIRED FUNCTIONAL MOBILITY AND ACTIVITY TOLERANCE: ICD-10-CM

## 2023-08-01 PROCEDURE — 97110 THERAPEUTIC EXERCISES: CPT

## 2023-08-01 PROCEDURE — 97140 MANUAL THERAPY 1/> REGIONS: CPT

## 2023-08-01 NOTE — PLAN OF CARE
Physical Therapy Treatment Note     Name: Dayana Mejia  Clinic Number: [id number]    Therapy Diagnosis:   Encounter Diagnoses   Name Primary?    Left shoulder pain, unspecified chronicity Yes    Impaired functional mobility and activity tolerance      Physician: Tavo Villalta MD    Visit Date: 2023    Physician Orders: PT Eval and Treat  Medical Diagnosis from Referral: Unspecific injury of muscle(s) and tendon(s) of the left shoulder rotator cuff  Evaluation Date: 2023  Authorization Period Expiration: Medically necessary  Plan of Care Expiration: 2023  Visit # / Visits authorized:      Time In: 1255  Time Out: 1348  Total Billable Time: 53 minutes     Surgery: Left shoulder arthroscopic bursectomy, rotator cuff debridement, labral debridement, biceps release 23  Orthopedic Precautions: Patient states MD has left progression to PT discretion  Pertinent History: See PMH in chart    Subjective     Patient reports: Significant improvement since previous session, /10 pain left shoulder (lateral proximal 1/3). Has been doing HEP.    CMS Impairment/Limitation/Restriction for FOTO Survey  Therapist reviewed FOTO scores for Dayana Mejia on 2023. FOTO documents entered into [employer] - see Media section.  Patient's Physical FS Primary Measure: 35  Risk Adjusted Statistical FOTO: 25  Limitation Score: 65%  Category: Carrying  DASH: 73.3% disability    Objective     Dayana received the following treatment:    Informed consent obtained after thorough explanation of risks and benefits. Signed consent form will be scanned into medical record. Pre-procedure time out performed which included verification of name, , and site of treatment. 70% isopropyl alcohol wipe down performed to treatment site with single use sterile prep pads.     Time Activities   Manual 25 min Intra-muscular pulsed stimulation using WILD ES-130 unit and TDN (see below):    TDN done to left scapula using Seirin 0.30 needle (50mm).  1 needle each to infraspinatus and teres major. Stim at 3Hz low frequency and 4 intensity for 6 min. Done in upright sitting position.            PROM left shoulder flexion/scaption, passive left pec stretch, STM to left scapular muscles    Biofreeze to left scapula and shoulder       TherAct     TherEx 28 min NuStep      BFRT - red cuff @ 320mmHg on proximal left humerus  --sidelying ER (30, 15x3)  --supine IR YTB (4x10)  --prone ext (25, 14x3)  --prone rows (20, 12x3)      -Wall towel wipes     Gait     Neuro Re-ed     Modalities     E-Stim     Dry Needling     Canalith Repositioning           Home Exercises Provided and Patient Education Provided     Education provided:   -Plan of care, HEP    Assessment     Continues to see improvement, this one significant as compared to previous session. PROM left scaption/flexion to ~170 deg. Wall towel wipe to ~180 deg. Strengthening and stabilization to left shoulder/scapular complex.    Patient prognosis is Good.      Anticipated barriers to physical therapy: None    Goals: Dayana is working towards her goals.  Short Term Goals: 6 weeks   Patient will report at least 10% increase in functional capacity from initial QuickDash score to indicate clinically significant functional improvement  Patient will report at least 10 point increase on initial FOTO score to indicate clinically significant functional improvement  Patient will demonstrate full left shoulder active flexion and abduction in order to improve her functional capacity        Long Term Goals: 12 weeks   Patient will report at least 20% increase in functional capacity from initial QuickDash score to indicate clinically significant functional improvement  Patient will report at least 20 point increase on initial FOTO score to indicate clinically significant functional improvement  Patient will demonstrate 5/5 strength all left shoulder motions to improve her functional strength during ADL performance    Plan      2-3x/week x 12 weeks    Angelica Thomas, PT

## 2023-08-03 ENCOUNTER — CLINICAL SUPPORT (OUTPATIENT)
Dept: REHABILITATION | Facility: HOSPITAL | Age: 63
End: 2023-08-03
Payer: COMMERCIAL

## 2023-08-03 DIAGNOSIS — M25.512 LEFT SHOULDER PAIN, UNSPECIFIED CHRONICITY: Primary | ICD-10-CM

## 2023-08-03 DIAGNOSIS — Z74.09 IMPAIRED FUNCTIONAL MOBILITY AND ACTIVITY TOLERANCE: ICD-10-CM

## 2023-08-03 PROCEDURE — 97110 THERAPEUTIC EXERCISES: CPT

## 2023-08-03 NOTE — PLAN OF CARE
Physical Therapy Treatment Note     Name: Dayana Mejia  Clinic Number: [id number]    Therapy Diagnosis:   Encounter Diagnoses   Name Primary?    Left shoulder pain, unspecified chronicity Yes    Impaired functional mobility and activity tolerance      Physician: Tavo Villalta MD    Visit Date: 8/3/2023    Physician Orders: PT Eval and Treat  Medical Diagnosis from Referral: Unspecific injury of muscle(s) and tendon(s) of the left shoulder rotator cuff  Evaluation Date: 7/25/2023  Authorization Period Expiration: Medically necessary  Plan of Care Expiration: 11/25/2023  Visit # / Visits authorized: 3/ 36     Time In: 1253  Time Out: 1332  Total Billable Time: 39 minutes     Surgery: Left shoulder arthroscopic bursectomy, rotator cuff debridement, labral debridement, biceps release 7/6/23  Orthopedic Precautions: Patient states MD has left progression to PT discretion  Pertinent History: See PMH in chart    Subjective     Patient reports: Soreness yesterday after Tuesday's session but today it is better. No adverse reports.    CMS Impairment/Limitation/Restriction for FOTO Survey  Therapist reviewed FOTO scores for Dayana Mejia on 7/25/2023. FOTO documents entered into [employer] - see Media section.  Patient's Physical FS Primary Measure: 35  Risk Adjusted Statistical FOTO: 25  Limitation Score: 65%  Category: Carrying  DASH: 73.3% disability    Objective     Dayana received the following treatment:     Time Activities   Manual  PROM left shoulder flexion/scaption, passive left pec stretch, STM to left scapular muscles    Biofreeze to left scapula and shoulder       TherAct     TherEx 27 min NuStep    Shoulder band  --ER (bilat)  --IR (left)  --Rows (left)  --Ext (left)  --Horz abd (bilat)    CW and CCW left shoulder circles at 90 deg (flexion, scaption, abduction)      Bicep curl (left)     E-Stim 12 min Hi[employer] to left UE for edema control and muscle activation - channel 1 middle third lateral humerus (negative) and  anterior delt (positive), channel 2 infraspinatus (positive) and teres major (negative)             Home Exercises Provided and Patient Education Provided     Education provided:   -Plan of care, HEP    Assessment     No tenderness to left scapula, which is significant improvement in short number of visits.    Continues to see improvement, this one significant as compared to previous session. PROM left scaption/flexion to ~170 deg. Wall towel wipe to ~180 deg. Strengthening and stabilization to left shoulder/scapular complex.    Patient prognosis is Good.      Anticipated barriers to physical therapy: None    Goals: Dayana is working towards her goals.  Short Term Goals: 6 weeks   Patient will report at least 10% increase in functional capacity from initial QuickDash score to indicate clinically significant functional improvement  Patient will report at least 10 point increase on initial FOTO score to indicate clinically significant functional improvement  Patient will demonstrate full left shoulder active flexion and abduction in order to improve her functional capacity        Long Term Goals: 12 weeks   Patient will report at least 20% increase in functional capacity from initial QuickDash score to indicate clinically significant functional improvement  Patient will report at least 20 point increase on initial FOTO score to indicate clinically significant functional improvement  Patient will demonstrate 5/5 strength all left shoulder motions to improve her functional strength during ADL performance    Plan     2-3x/week x 12 weeks    Angelica Thomas, PT

## 2023-08-08 ENCOUNTER — CLINICAL SUPPORT (OUTPATIENT)
Dept: REHABILITATION | Facility: HOSPITAL | Age: 63
End: 2023-08-08
Payer: COMMERCIAL

## 2023-08-08 DIAGNOSIS — M25.512 LEFT SHOULDER PAIN, UNSPECIFIED CHRONICITY: Primary | ICD-10-CM

## 2023-08-08 DIAGNOSIS — Z74.09 IMPAIRED FUNCTIONAL MOBILITY AND ACTIVITY TOLERANCE: ICD-10-CM

## 2023-08-08 PROCEDURE — 97110 THERAPEUTIC EXERCISES: CPT

## 2023-08-08 PROCEDURE — 97112 NEUROMUSCULAR REEDUCATION: CPT

## 2023-08-08 NOTE — PLAN OF CARE
Physical Therapy Treatment Note     Name: Dayana Mejia  Clinic Number: [id number]    Therapy Diagnosis:   Encounter Diagnoses   Name Primary?    Left shoulder pain, unspecified chronicity Yes    Impaired functional mobility and activity tolerance      Physician: Tavo Villalta MD    Visit Date: 8/8/2023    Physician Orders: PT Eval and Treat  Medical Diagnosis from Referral: Unspecific injury of muscle(s) and tendon(s) of the left shoulder rotator cuff  Evaluation Date: 7/25/2023  Authorization Period Expiration: Medically necessary  Plan of Care Expiration: 11/25/2023  Visit # / Visits authorized: 4/ 36     Time In: 0839  Time Out: 0922  Total Billable Time: 43 minutes     Surgery: Left shoulder arthroscopic bursectomy, rotator cuff debridement, labral debridement, biceps release 7/6/23  Orthopedic Precautions: Patient states MD has left progression to PT discretion  Pertinent History: See PMH in chart    Subjective     Patient reports: Has good days and bad days. Her good days she does a lot with the arm and it results in soreness (bad days) the following day.    CMS Impairment/Limitation/Restriction for FOTO Survey  Therapist reviewed FOTO scores for Dayana Mejia on 7/25/2023. FOTO documents entered into EPIC - see Media section.  Patient's Physical FS Primary Measure: 35  Risk Adjusted Statistical FOTO: 25  Limitation Score: 65%  Category: Carrying  DASH: 73.3% disability    Objective     Dayana received the following treatment:     Time Activities   Manual  PROM left shoulder flexion/scaption, passive left pec stretch, STM to left scapular muscles    Biofreeze to left scapula and shoulder       TherAct     TherEx 29 min NuStep    Shoulder band  --ER (bilat)  --IR (left)  --Rows (left)  --Ext (left)  --Horz abd (bilat)    CW and CCW left shoulder circles at 90 deg (flexion, scaption, abduction)      Bicep curl (left)     E-Stim 14 min HiVolt to left UE for edema control and muscle activation - channel 1 middle  third lateral humerus (negative) and anterior delt (positive), channel 2 infraspinatus (positive) and teres major (negative)             Home Exercises Provided and Patient Education Provided     Education provided:   -Plan of care, HEP, acclimation to ADLs with expected soreness in left arm    Assessment     No tenderness to left scapula, which is significant improvement in short number of visits.    Continues to see improvement, this one significant as compared to previous session. PROM left scaption/flexion to ~170 deg. Wall towel wipe to ~180 deg. Strengthening and stabilization to left shoulder/scapular complex. We discussed that soreness in left arm is expected with increase in activity this early post-op. As her body accomodates the load demand, she should experience less soreness in left arm.    Patient prognosis is Good.      Anticipated barriers to physical therapy: None    Goals: Dayana is working towards her goals.  Short Term Goals: 6 weeks   Patient will report at least 10% increase in functional capacity from initial QuickDash score to indicate clinically significant functional improvement  Patient will report at least 10 point increase on initial FOTO score to indicate clinically significant functional improvement  Patient will demonstrate full left shoulder active flexion and abduction in order to improve her functional capacity        Long Term Goals: 12 weeks   Patient will report at least 20% increase in functional capacity from initial QuickDash score to indicate clinically significant functional improvement  Patient will report at least 20 point increase on initial FOTO score to indicate clinically significant functional improvement  Patient will demonstrate 5/5 strength all left shoulder motions to improve her functional strength during ADL performance    Plan     2-3x/week x 12 weeks    Angelica Thomas, PT

## 2023-08-10 DIAGNOSIS — Z98.1 STATUS POST CERVICAL SPINAL FUSION: ICD-10-CM

## 2023-08-10 DIAGNOSIS — M47.22 CERVICAL SPONDYLOSIS WITH RADICULOPATHY: ICD-10-CM

## 2023-08-10 RX ORDER — METHOCARBAMOL 500 MG/1
500-1000 TABLET, FILM COATED ORAL 3 TIMES DAILY PRN
Qty: 40 TABLET | Refills: 2 | Status: SHIPPED | OUTPATIENT
Start: 2023-08-10 | End: 2023-08-30

## 2023-08-10 NOTE — TELEPHONE ENCOUNTER
Received paper request for Robaxin refill from pharmacy.  Refill e-scribed to [facility] in [place].

## 2023-08-11 ENCOUNTER — CLINICAL SUPPORT (OUTPATIENT)
Dept: REHABILITATION | Facility: HOSPITAL | Age: 63
End: 2023-08-11
Payer: COMMERCIAL

## 2023-08-11 DIAGNOSIS — M25.512 LEFT SHOULDER PAIN, UNSPECIFIED CHRONICITY: Primary | ICD-10-CM

## 2023-08-11 DIAGNOSIS — Z74.09 IMPAIRED FUNCTIONAL MOBILITY AND ACTIVITY TOLERANCE: ICD-10-CM

## 2023-08-11 PROCEDURE — 97110 THERAPEUTIC EXERCISES: CPT

## 2023-08-11 PROCEDURE — 97014 ELECTRIC STIMULATION THERAPY: CPT

## 2023-08-11 NOTE — PLAN OF CARE
Physical Therapy Treatment Note     Name: Dayana Mejia  Clinic Number: [id number]    Therapy Diagnosis:   Encounter Diagnoses   Name Primary?    Left shoulder pain, unspecified chronicity Yes    Impaired functional mobility and activity tolerance      Physician: Tavo Villalta MD    Visit Date: 8/11/2023    Physician Orders: PT Eval and Treat  Medical Diagnosis from Referral: Unspecific injury of muscle(s) and tendon(s) of the left shoulder rotator cuff  Evaluation Date: 7/25/2023  Authorization Period Expiration: Medically necessary  Plan of Care Expiration: 11/25/2023  Visit # / Visits authorized: 5/ 36     Time In: 0923  Time Out: 1008  Total Billable Time: 45 minutes     Surgery: Left shoulder arthroscopic bursectomy, rotator cuff debridement, labral debridement, biceps release 7/6/23  Orthopedic Precautions: Patient states MD has left progression to PT discretion  Pertinent History: See PMH in chart    Subjective     Patient reports: No significant changes. Goes to see MD on Monday 8/14.    CMS Impairment/Limitation/Restriction for FOTO Survey  Therapist reviewed FOTO scores for Dayana Mejia on 7/25/2023. FOTO documents entered into EPIC - see Media section.  Patient's Physical FS Primary Measure: 35  Risk Adjusted Statistical FOTO: 25  Limitation Score: 65%  Category: Carrying  DASH: 73.3% disability    Objective     Dayana received the following treatment:     Time Activities   Manual  PROM left shoulder flexion/scaption, passive left pec stretch, STM to left scapular muscles    Biofreeze to left scapula and shoulder       TherAct     TherEx 34 min NuStep    Shoulder band  --ER (bilat)  --IR (left)  --Rows (left)  --Ext (left)  --Horz abd (bilat)    CW and CCW left shoulder circles at 90 deg (flexion, scaption, abduction)      Bicep curl (left)     E-Stim 11 min IFC to left UE for edema control and muscle relaxation - channel 1 middle third lateral humerus (negative) and anterior delt (positive), channel 2  infraspinatus (positive) and teres major (negative)             Home Exercises Provided and Patient Education Provided     Education provided:   -Plan of care, HEP, acclimation to ADLs with expected soreness in left arm    Assessment     No tenderness to left scapula, which is significant improvement in short number of visits.    Continues to see improvement, this one significant as compared to previous session. PROM left scaption/flexion to ~170 deg. Wall towel wipe to ~180 deg. Strengthening and stabilization to left shoulder/scapular complex. We discussed that soreness in left arm is expected with increase in activity this early post-op. As her body accomodates the load demand, she should experience less soreness in left arm.    Patient prognosis is Good.      Anticipated barriers to physical therapy: None    Goals: Dayana is working towards her goals.  Short Term Goals: 6 weeks   Patient will report at least 10% increase in functional capacity from initial QuickDash score to indicate clinically significant functional improvement  Patient will report at least 10 point increase on initial FOTO score to indicate clinically significant functional improvement  Patient will demonstrate full left shoulder active flexion and abduction in order to improve her functional capacity        Long Term Goals: 12 weeks   Patient will report at least 20% increase in functional capacity from initial QuickDash score to indicate clinically significant functional improvement  Patient will report at least 20 point increase on initial FOTO score to indicate clinically significant functional improvement  Patient will demonstrate 5/5 strength all left shoulder motions to improve her functional strength during ADL performance    Plan     2-3x/week x 12 weeks    Angelica Thomas, PT

## 2023-08-15 ENCOUNTER — CLINICAL SUPPORT (OUTPATIENT)
Dept: REHABILITATION | Facility: HOSPITAL | Age: 63
End: 2023-08-15
Payer: COMMERCIAL

## 2023-08-15 DIAGNOSIS — M25.512 LEFT SHOULDER PAIN, UNSPECIFIED CHRONICITY: Primary | ICD-10-CM

## 2023-08-15 DIAGNOSIS — Z74.09 IMPAIRED FUNCTIONAL MOBILITY AND ACTIVITY TOLERANCE: ICD-10-CM

## 2023-08-15 PROCEDURE — 97110 THERAPEUTIC EXERCISES: CPT

## 2023-08-15 NOTE — PLAN OF CARE
Physical Therapy Treatment Note     Name: Dayana Mejia  Clinic Number: [id number]    Therapy Diagnosis:   Encounter Diagnoses   Name Primary?    Left shoulder pain, unspecified chronicity Yes    Impaired functional mobility and activity tolerance      Physician: Tavo Villalta MD    Visit Date: 8/15/2023    Physician Orders: PT Eval and Treat  Medical Diagnosis from Referral: Unspecific injury of muscle(s) and tendon(s) of the left shoulder rotator cuff  Evaluation Date: 7/25/2023  Authorization Period Expiration: Medically necessary  Plan of Care Expiration: 11/25/2023  Visit # / Visits authorized: 6/ 36     Time In: 1012  Time Out: 1051  Total Billable Time: 39 minutes     Surgery: Left shoulder arthroscopic bursectomy, rotator cuff debridement, labral debridement, biceps release 7/6/23  Orthopedic Precautions: Patient states MD has left progression to PT discretion  Pertinent History: See PMH in chart    Subjective     Patient reports: No significant changes. Saw surgeon MD on Monday 8/14, states visit went very well and he was pleased with her current level.    CMS Impairment/Limitation/Restriction for FOTO Survey  Therapist reviewed FOTO scores for Dayana Mejia on 7/25/2023. FOTO documents entered into EPIC - see Media section.  Patient's Physical FS Primary Measure: 35  Risk Adjusted Statistical FOTO: 25  Limitation Score: 65%  Category: Carrying  DASH: 73.3% disability      Therapist reviewed FOTO scores for Dayana Mejia on 8/15/2023. FOTO documents entered into EPIC - see Media section.  Patient's Physical FS Primary Measure: 54  Risk Adjusted Statistical FOTO: 25  Limitation Score: 46%  Category: Carrying  DASH: 33.3% disability      Objective     Dayana received the following treatment:     Time Activities   Manual  PROM left shoulder flexion/scaption, passive left pec stretch, STM to left scapular muscles    Biofreeze to left scapula and shoulder       TherAct     TherEx 39 min NuStep    Shoulder band  --ER  (bilat)  --IR (left)  --Rows (left)  --Ext (left)  --Horz abd (bilat)    CW and CCW left shoulder circles at 90 deg (flexion, scaption, abduction)      Bicep curl (left)     E-Stim  IFC to left UE for edema control and muscle relaxation - channel 1 middle third lateral humerus (negative) and anterior delt (positive), channel 2 infraspinatus (positive) and teres major (negative)             Home Exercises Provided and Patient Education Provided     Education provided:   -Plan of care, HEP, acclimation to ADLs with expected soreness in left arm    Assessment     No tenderness to left scapula, which is significant improvement in short number of visits. Progressing AROM left shoulder, flexion in dependent position to 175 deg and no pain.    Strengthening and stabilization to left shoulder/scapular complex. We discussed that soreness in left arm is expected with increase in activity this early post-op. As her body accomodates the load demand, she should experience less soreness in left arm.    Patient prognosis is Good.      Anticipated barriers to physical therapy: None    Goals: Dayana is working towards her goals.  Short Term Goals: 6 weeks   Patient will report at least 10% increase in functional capacity from initial QuickDash score to indicate clinically significant functional improvement (goal met)  Patient will report at least 10 point increase on initial FOTO score to indicate clinically significant functional improvement (goal met)  Patient will demonstrate full left shoulder active flexion and abduction in order to improve her functional capacity        Long Term Goals: 12 weeks   Patient will report at least 20% increase in functional capacity from initial QuickDash score to indicate clinically significant functional improvement (goal met)  Patient will report at least 20 point increase on initial FOTO score to indicate clinically significant functional improvement (progressing)  Patient will demonstrate 5/5  strength all left shoulder motions to improve her functional strength during ADL performance    Plan     2-3x/week x 12 weeks    Angelica Thomas, PT

## 2023-08-17 ENCOUNTER — CLINICAL SUPPORT (OUTPATIENT)
Dept: REHABILITATION | Facility: HOSPITAL | Age: 63
End: 2023-08-17
Payer: COMMERCIAL

## 2023-08-17 DIAGNOSIS — Z74.09 IMPAIRED FUNCTIONAL MOBILITY AND ACTIVITY TOLERANCE: ICD-10-CM

## 2023-08-17 DIAGNOSIS — M25.512 LEFT SHOULDER PAIN, UNSPECIFIED CHRONICITY: Primary | ICD-10-CM

## 2023-08-17 PROCEDURE — 97110 THERAPEUTIC EXERCISES: CPT

## 2023-08-17 NOTE — PLAN OF CARE
Physical Therapy Treatment Note     Name: Dayana Mejia  Clinic Number: [id number]    Therapy Diagnosis:   Encounter Diagnoses   Name Primary?    Left shoulder pain, unspecified chronicity Yes    Impaired functional mobility and activity tolerance      Physician: Tavo Villalta MD    Visit Date: 8/17/2023    Physician Orders: PT Eval and Treat  Medical Diagnosis from Referral: Unspecific injury of muscle(s) and tendon(s) of the left shoulder rotator cuff  Evaluation Date: 7/25/2023  Authorization Period Expiration: Medically necessary  Plan of Care Expiration: 11/25/2023  Visit # / Visits authorized: 7/ 36     Time In: 0857  Time Out: 0929  Total Billable Time: 32 minutes     Surgery: Left shoulder arthroscopic bursectomy, rotator cuff debridement, labral debridement, biceps release 7/6/23  Orthopedic Precautions: Patient states MD has left progression to PT discretion  Pertinent History: See PMH in chart    Subjective     Patient reports: No significant changes. Soreness to left shoulder following previous session consistent with DOMS. Current 2-3/10 pain left shoulder.    CMS Impairment/Limitation/Restriction for FOTO Survey  Therapist reviewed FOTO scores for Dayana Mejia on 7/25/2023. FOTO documents entered into EPIC - see Media section.  Patient's Physical FS Primary Measure: 35  Risk Adjusted Statistical FOTO: 25  Limitation Score: 65%  Category: Carrying  DASH: 73.3% disability      Therapist reviewed FOTO scores for Dayana Mejia on 8/15/2023. FOTO documents entered into EPIC - see Media section.  Patient's Physical FS Primary Measure: 54  Risk Adjusted Statistical FOTO: 25  Limitation Score: 46%  Category: Carrying  DASH: 33.3% disability      Objective     Dayana received the following treatment:     Time Activities   Manual  PROM left shoulder flexion/scaption, passive left pec stretch, STM to left scapular muscles    Biofreeze to left scapula and shoulder       TherAct     TherEx 32 min NuStep    Shoulder  band  --ER (bilat)  --IR (left)  --Rows (left)  --Ext (left)  --Horz abd (bilat)    CW and CCW left shoulder circles at 90 deg (flexion, scaption, abduction)      Bicep curl (left)     E-Stim  IFC to left UE for edema control and muscle relaxation - channel 1 middle third lateral humerus (negative) and anterior delt (positive), channel 2 infraspinatus (positive) and teres major (negative)             Home Exercises Provided and Patient Education Provided     Education provided:   -Plan of care, HEP, acclimation to ADLs with expected soreness in left arm    Assessment     Progressing AROM left shoulder, flexion in dependent position to 175 deg and no pain.    Strengthening and stabilization to left shoulder/scapular complex. We discussed that soreness in left arm is expected with increase in activity this early post-op. As her body accomodates the load demand, she should experience less soreness in left arm.    Patient prognosis is Good.      Anticipated barriers to physical therapy: None    Goals: Dayana is working towards her goals.  Short Term Goals: 6 weeks   Patient will report at least 10% increase in functional capacity from initial QuickDash score to indicate clinically significant functional improvement (goal met)  Patient will report at least 10 point increase on initial FOTO score to indicate clinically significant functional improvement (goal met)  Patient will demonstrate full left shoulder active flexion and abduction in order to improve her functional capacity        Long Term Goals: 12 weeks   Patient will report at least 20% increase in functional capacity from initial QuickDash score to indicate clinically significant functional improvement (goal met)  Patient will report at least 20 point increase on initial FOTO score to indicate clinically significant functional improvement (progressing)  Patient will demonstrate 5/5 strength all left shoulder motions to improve her functional strength during ADL  performance    Plan     2-3x/week x 12 weeks    Angelica Thomas, PT

## 2023-08-22 ENCOUNTER — CLINICAL SUPPORT (OUTPATIENT)
Dept: REHABILITATION | Facility: HOSPITAL | Age: 63
End: 2023-08-22
Payer: COMMERCIAL

## 2023-08-22 DIAGNOSIS — Z74.09 IMPAIRED FUNCTIONAL MOBILITY AND ACTIVITY TOLERANCE: ICD-10-CM

## 2023-08-22 DIAGNOSIS — M25.512 LEFT SHOULDER PAIN, UNSPECIFIED CHRONICITY: Primary | ICD-10-CM

## 2023-08-22 PROCEDURE — 97110 THERAPEUTIC EXERCISES: CPT

## 2023-08-24 ENCOUNTER — HOSPITAL ENCOUNTER (OUTPATIENT)
Dept: RADIOLOGY | Facility: HOSPITAL | Age: 63
Discharge: HOME | End: 2023-08-24
Attending: NEUROLOGICAL SURGERY
Payer: COMMERCIAL

## 2023-08-24 ENCOUNTER — CLINICAL SUPPORT (OUTPATIENT)
Dept: REHABILITATION | Facility: HOSPITAL | Age: 63
End: 2023-08-24
Payer: COMMERCIAL

## 2023-08-24 DIAGNOSIS — M25.512 LEFT SHOULDER PAIN, UNSPECIFIED CHRONICITY: Primary | ICD-10-CM

## 2023-08-24 DIAGNOSIS — Z98.1 STATUS POST CERVICAL SPINAL FUSION: ICD-10-CM

## 2023-08-24 DIAGNOSIS — M47.22 CERVICAL SPONDYLOSIS WITH RADICULOPATHY: ICD-10-CM

## 2023-08-24 PROCEDURE — 97110 THERAPEUTIC EXERCISES: CPT

## 2023-08-24 PROCEDURE — 72052 X-RAY EXAM NECK SPINE 6/>VWS: CPT | Mod: TC

## 2023-08-24 NOTE — PLAN OF CARE
Physical Therapy Treatment Note      Name: Dayana Mejia  Clinic Number: [id number]     Therapy Diagnosis:        Encounter Diagnoses   Name Primary?    Left shoulder pain, unspecified chronicity Yes    Impaired functional mobility and activity tolerance        Physician: Tavo Villalta MD     Visit Date: 8/24/2023     Physician Orders: PT Eval and Treat  Medical Diagnosis from Referral: Unspecific injury of muscle(s) and tendon(s) of the left shoulder rotator cuff  Evaluation Date: 7/25/2023  Authorization Period Expiration: Medically necessary  Plan of Care Expiration: 11/25/2023  Visit # / Visits authorized: 9/ 36     Time In: 0900  Time Out: 0930  Total Billable Time: 30 minutes     Surgery: Left shoulder arthroscopic bursectomy, rotator cuff debridement, labral debridement, biceps release 7/6/23  Orthopedic Precautions: Patient states MD has left progression to PT discretion  Pertinent History: See PMH in chart     Subjective      Patient reports: No significant changes. Soreness to left shoulder following previous session consistent with DOMS. Current 3/10 pain left shoulder, but patient says she is tolerating increased activity at home.     CMS Impairment/Limitation/Restriction for FOTO Survey  Therapist reviewed FOTO scores for Dayana Mejia on 7/25/2023. FOTO documents entered into EPIC - see Media section.  Patient's Physical FS Primary Measure: 35  Risk Adjusted Statistical FOTO: 25  Limitation Score: 65%  Category: Carrying  DASH: 73.3% disability        Therapist reviewed FOTO scores for Dayana Mejia on 8/15/2023. FOTO documents entered into EPIC - see Media section.  Patient's Physical FS Primary Measure: 54  Risk Adjusted Statistical FOTO: 25  Limitation Score: 46%  Category: Carrying  DASH: 33.3% disability        Objective      Dayana received the following treatment:       Time Activities   Manual   PROM left shoulder flexion/scaption, passive left pec stretch, STM to left scapular muscles     Biofreeze  to left scapula and shoulder         TherAct       TherEx 30 min NuStep     Shoulder band  --ER  --IR   --Rows  --Ext  --Horz abd     CW and CCW left shoulder circles at 90 deg (flexion, scaption, abduction)        Bicep curl (left)        Shoulder dumbbell x 3 (flx, scaption, abd) to 90 deg      E-Stim   IFC to left UE for edema control and muscle relaxation - channel 1 middle third lateral humerus (negative) and anterior delt (positive), channel 2 infraspinatus (positive) and teres major (negative)                  Home Exercises Provided and Patient Education Provided      Education provided:   -Plan of care, HEP, acclimation to ADLs with expected soreness in left arm     Assessment      Strengthening and stabilization to left shoulder/scapular complex. We discussed that soreness in left arm is expected with increase in activity this early post-op. As her body accomodates the load demand, she should experience less soreness in left arm.     Patient prognosis is Good.      Anticipated barriers to physical therapy: None     Goals: Dayana is working towards her goals.  Short Term Goals: 6 weeks   Patient will report at least 10% increase in functional capacity from initial QuickDash score to indicate clinically significant functional improvement (goal met)  Patient will report at least 10 point increase on initial FOTO score to indicate clinically significant functional improvement (goal met)  Patient will demonstrate full left shoulder active flexion and abduction in order to improve her functional capacity        Long Term Goals: 12 weeks   Patient will report at least 20% increase in functional capacity from initial QuickDash score to indicate clinically significant functional improvement (goal met)  Patient will report at least 20 point increase on initial FOTO score to indicate clinically significant functional improvement (progressing)  Patient will demonstrate 5/5 strength all left shoulder motions to improve  her functional strength during ADL performance     Plan      2-3x/week x 12 weeks     Nicole Champion, PT

## 2023-08-29 ENCOUNTER — CLINICAL SUPPORT (OUTPATIENT)
Dept: REHABILITATION | Facility: HOSPITAL | Age: 63
End: 2023-08-29
Payer: COMMERCIAL

## 2023-08-29 DIAGNOSIS — M25.512 LEFT SHOULDER PAIN, UNSPECIFIED CHRONICITY: Primary | ICD-10-CM

## 2023-08-29 DIAGNOSIS — Z74.09 IMPAIRED FUNCTIONAL MOBILITY AND ACTIVITY TOLERANCE: ICD-10-CM

## 2023-08-29 PROCEDURE — 97110 THERAPEUTIC EXERCISES: CPT

## 2023-08-29 NOTE — PLAN OF CARE
Physical Therapy Treatment Note      Name: Dayana Mejia  Clinic Number: [id number]     Therapy Diagnosis:        Encounter Diagnoses   Name Primary?    Left shoulder pain, unspecified chronicity Yes    Impaired functional mobility and activity tolerance        Physician: Tavo Villalta MD     Visit Date: 8/24/2023     Physician Orders: PT Eval and Treat  Medical Diagnosis from Referral: Unspecific injury of muscle(s) and tendon(s) of the left shoulder rotator cuff  Evaluation Date: 7/25/2023  Authorization Period Expiration: Medically necessary  Plan of Care Expiration: 11/25/2023  Visit # / Visits authorized: 10/ 36     Time In: 1449  Time Out: 1512  Total Billable Time: 23 minutes     Surgery: Left shoulder arthroscopic bursectomy, rotator cuff debridement, labral debridement, biceps release 7/6/23  Orthopedic Precautions: Patient states MD has left progression to PT discretion  Pertinent History: See PMH in chart     Subjective      Patient reports: No significant changes. Soreness to left shoulder following previous session consistent with DOMS. Current 3/10 pain left shoulder, but she is tolerating increased activity at home.     CMS Impairment/Limitation/Restriction for FOTO Survey  Therapist reviewed FOTO scores for Dayana Mejia on 7/25/2023. FOTO documents entered into EPIC - see Media section.  Patient's Physical FS Primary Measure: 35  Risk Adjusted Statistical FOTO: 25  Limitation Score: 65%  Category: Carrying  DASH: 73.3% disability        Therapist reviewed FOTO scores for Dayana Mejia on 8/15/2023. FOTO documents entered into EPIC - see Media section.  Patient's Physical FS Primary Measure: 54  Risk Adjusted Statistical FOTO: 25  Limitation Score: 46%  Category: Carrying  DASH: 33.3% disability        Objective      Dayana received the following treatment:       Time Activities   Manual   PROM left shoulder flexion/scaption, passive left pec stretch, STM to left scapular muscles     Biofreeze to left  scapula and shoulder         TherAct       TherEx 23 min Wall towel wipes, bicep curl (left), Shoulder dumbbell x 3 (flx, scaption, abd) to 90 deg, bent row, bent shoulder ext, shoulder press (flexion, abduction), NuStep cool down            E-Stim   IFC to left UE for edema control and muscle relaxation - channel 1 middle third lateral humerus (negative) and anterior delt (positive), channel 2 infraspinatus (positive) and teres major (negative)                  Home Exercises Provided and Patient Education Provided      Education provided:   -Plan of care, HEP, acclimation to ADLs with expected soreness in left arm     Assessment      Strengthening and stabilization to left shoulder/scapular complex. Shifted activities to involve more lifting, as this is where she has the most difficulty. As her body accomodates the load demand, she should experience less soreness in left arm.     Patient prognosis is Good.      Anticipated barriers to physical therapy: None     Goals: Dayana is working towards her goals.  Short Term Goals: 6 weeks   Patient will report at least 10% increase in functional capacity from initial QuickDash score to indicate clinically significant functional improvement (goal met)  Patient will report at least 10 point increase on initial FOTO score to indicate clinically significant functional improvement (goal met)  Patient will demonstrate full left shoulder active flexion and abduction in order to improve her functional capacity        Long Term Goals: 12 weeks   Patient will report at least 20% increase in functional capacity from initial QuickDash score to indicate clinically significant functional improvement (goal met)  Patient will report at least 20 point increase on initial FOTO score to indicate clinically significant functional improvement (progressing)  Patient will demonstrate 5/5 strength all left shoulder motions to improve her functional strength during ADL performance     Plan       2-3x/week x 12 weeks     Angelica Thomas, PT

## 2023-08-30 ENCOUNTER — OFFICE VISIT (OUTPATIENT)
Dept: NEUROSURGERY | Facility: CLINIC | Age: 63
End: 2023-08-30
Payer: COMMERCIAL

## 2023-08-30 VITALS
SYSTOLIC BLOOD PRESSURE: 110 MMHG | HEART RATE: 86 BPM | HEIGHT: 65 IN | BODY MASS INDEX: 23.16 KG/M2 | WEIGHT: 139 LBS | RESPIRATION RATE: 16 BRPM | DIASTOLIC BLOOD PRESSURE: 73 MMHG

## 2023-08-30 DIAGNOSIS — M47.12 CERVICAL SPONDYLOSIS WITH MYELOPATHY: ICD-10-CM

## 2023-08-30 DIAGNOSIS — Z98.1 STATUS POST CERVICAL SPINAL FUSION: Primary | ICD-10-CM

## 2023-08-30 DIAGNOSIS — M81.0 AGE-RELATED OSTEOPOROSIS WITHOUT CURRENT PATHOLOGICAL FRACTURE: ICD-10-CM

## 2023-08-30 PROCEDURE — 1160F PR REVIEW ALL MEDS BY PRESCRIBER/CLIN PHARMACIST DOCUMENTED: ICD-10-PCS | Mod: CPTII,,, | Performed by: PHYSICIAN ASSISTANT

## 2023-08-30 PROCEDURE — 3044F PR MOST RECENT HEMOGLOBIN A1C LEVEL <7.0%: ICD-10-PCS | Mod: CPTII,,, | Performed by: PHYSICIAN ASSISTANT

## 2023-08-30 PROCEDURE — 3008F BODY MASS INDEX DOCD: CPT | Mod: CPTII,,, | Performed by: PHYSICIAN ASSISTANT

## 2023-08-30 PROCEDURE — 1159F PR MEDICATION LIST DOCUMENTED IN MEDICAL RECORD: ICD-10-PCS | Mod: CPTII,,, | Performed by: PHYSICIAN ASSISTANT

## 2023-08-30 PROCEDURE — 99213 OFFICE O/P EST LOW 20 MIN: CPT | Mod: ,,, | Performed by: PHYSICIAN ASSISTANT

## 2023-08-30 PROCEDURE — 3044F HG A1C LEVEL LT 7.0%: CPT | Mod: CPTII,,, | Performed by: PHYSICIAN ASSISTANT

## 2023-08-30 PROCEDURE — 3074F PR MOST RECENT SYSTOLIC BLOOD PRESSURE < 130 MM HG: ICD-10-PCS | Mod: CPTII,,, | Performed by: PHYSICIAN ASSISTANT

## 2023-08-30 PROCEDURE — 1159F MED LIST DOCD IN RCRD: CPT | Mod: CPTII,,, | Performed by: PHYSICIAN ASSISTANT

## 2023-08-30 PROCEDURE — 3008F PR BODY MASS INDEX (BMI) DOCUMENTED: ICD-10-PCS | Mod: CPTII,,, | Performed by: PHYSICIAN ASSISTANT

## 2023-08-30 PROCEDURE — 99213 PR OFFICE/OUTPT VISIT, EST, LEVL III, 20-29 MIN: ICD-10-PCS | Mod: ,,, | Performed by: PHYSICIAN ASSISTANT

## 2023-08-30 PROCEDURE — 1160F RVW MEDS BY RX/DR IN RCRD: CPT | Mod: CPTII,,, | Performed by: PHYSICIAN ASSISTANT

## 2023-08-30 PROCEDURE — 3078F DIAST BP <80 MM HG: CPT | Mod: CPTII,,, | Performed by: PHYSICIAN ASSISTANT

## 2023-08-30 PROCEDURE — 3074F SYST BP LT 130 MM HG: CPT | Mod: CPTII,,, | Performed by: PHYSICIAN ASSISTANT

## 2023-08-30 PROCEDURE — 3078F PR MOST RECENT DIASTOLIC BLOOD PRESSURE < 80 MM HG: ICD-10-PCS | Mod: CPTII,,, | Performed by: PHYSICIAN ASSISTANT

## 2023-08-30 RX ORDER — CEFADROXIL 500 MG/1
CAPSULE ORAL
COMMUNITY
Start: 2023-07-19 | End: 2023-08-30

## 2023-08-30 RX ORDER — HYDROCODONE BITARTRATE AND ACETAMINOPHEN 7.5; 325 MG/1; MG/1
TABLET ORAL
COMMUNITY
Start: 2023-07-20 | End: 2023-08-30

## 2023-08-30 RX ORDER — APIXABAN 2.5 MG/1
2.5 TABLET, FILM COATED ORAL 2 TIMES DAILY
COMMUNITY
Start: 2023-07-08 | End: 2023-08-30

## 2023-08-30 RX ORDER — MUPIROCIN 20 MG/G
OINTMENT TOPICAL
COMMUNITY
Start: 2023-07-24 | End: 2023-08-30

## 2023-08-30 RX ORDER — TRAMADOL HYDROCHLORIDE 50 MG/1
50 TABLET ORAL EVERY 6 HOURS PRN
COMMUNITY
Start: 2023-08-14

## 2023-08-30 RX ORDER — ONDANSETRON HYDROCHLORIDE 8 MG/1
TABLET, FILM COATED ORAL
COMMUNITY
Start: 2023-07-19 | End: 2023-08-30

## 2023-08-30 NOTE — PATIENT INSTRUCTIONS
Stop Vitamin D and Calcium supplement with vitamin D for 2 weeks.  Resume the Calcium, Vitamin D, and Magnesium thereafter.

## 2023-08-30 NOTE — PROGRESS NOTES
[facility]  History & Physical  Neurosurgery      Dayana Mejia   [id number]   1960       SUBJECTIVE:     CHIEF COMPLAINT:  Neck stiffness.  Left shoulder pain      HPI:  Dayana Mejia is a 63 y.o. female who presents for near postoperative follow up appointment.  On 08/02/2022, the patient underwent C5-6 and C6-7 ACDF with Dr. Tsai.  Overall, she is doing very well in regards to her neck.  There is no neck pain.  She is bothered with stiffness in her neck.  In addition, she underwent rotator cuff repair on 07/06/2023.  She is currently in physical therapy.  She has improved range of motion in the shoulder.  She continues with some pain.      Past Medical History:   Diagnosis Date    Anxiety     Asthma     Cervical spondylosis with myelopathy     Cervical spondylosis with radiculopathy     Chronic shoulder bursitis, left 7/6/2023    Decreased shoulder mobility, left     Degenerative superior labral anterior-to-posterior (SLAP) tear of shoulder 7/6/2023    DM (diabetes mellitus)     Dyslipidemia     Granuloma annulare     History of DVT (deep vein thrombosis) 2015    History of renal cell carcinoma     HTN (hypertension)     IBS (irritable bowel syndrome)     Incomplete tear of left rotator cuff 7/6/2023    Left arm pain     Left arm weakness     Left shoulder pain     Migraine     Neuropathy, arm, left     OA (osteoarthritis)     Type 2 diabetes mellitus without complications        Past Surgical History:   Procedure Laterality Date    ANTERIOR CERVICAL DISCECTOMY W/ FUSION N/A 08/02/2022    C5-6, 6-7 ACDF.  Dr. Tsai    ARTHROSCOPIC DEBRIDEMENT OF ROTATOR CUFF Left 07/06/2023    Procedure: DEBRIDEMENT, ROTATOR CUFF, ARTHROSCOPIC;  Surgeon: Tavo Villalta MD;  Location: [facility];  Service: Orthopedics;  Laterality: Left;  W ITH LABRUM DEBRIDEMENT     ARTHROSCOPIC REPAIR OF ROTATOR CUFF OF SHOULDER Left 07/06/2023    Procedure: REPAIR, ROTATOR CUFF, ARTHROSCOPIC;  Surgeon: Tavo Villalta MD;   Location: [facility] OR;  Service: Orthopedics;  Laterality: Left;    BREAST MASS EXCISION Left 2012    CHOLECYSTECTOMY  1987    open    COLONOSCOPY      EXCISION OF BURSA Left 07/06/2023    Procedure: PARTIAL BURSECTOMY;  Surgeon: Tavo Villalta MD;  Location: [facility] OR;  Service: Orthopedics;  Laterality: Left;  LEFT PARTIAL BURSECTOMY     HERNIA REPAIR  1987    LAPAROSCOPIC NEPHRECTOMY Left 05/05/2017    Dr. Chucho Au    Left C5-6, C6-7 foraminotomies  06/20/2019    Dr. Tsai       Family History   Problem Relation Age of Onset    Coronary artery disease Mother     Hypertension Mother     Stroke Mother     Hyperlipidemia Mother     Cancer Father     Coronary artery disease Father        Social History     Socioeconomic History    Marital status:    Tobacco Use    Smoking status: Every Day     Current packs/day: 0.50     Average packs/day: 0.5 packs/day for 45.0 years (22.5 ttl pk-yrs)     Types: Cigarettes    Smokeless tobacco: Never   Substance and Sexual Activity    Alcohol use: Yes     Alcohol/week: 6.0 standard drinks of alcohol     Types: 3 Glasses of wine, 3 Cans of beer per week       Review of patient's allergies indicates:   Allergen Reactions    Codeine Nausea And Vomiting     Other reaction(s): Vomiting (disorder)          Current Outpatient Medications   Medication Instructions    acetylglucosamine, bulk, (N-ACETYL-ALPHA-D-GLUCOSAMINE) Powd 600 mg, Oral, 2 times daily    albuterol (PROVENTIL/VENTOLIN HFA) 90 mcg/actuation inhaler 2 puffs, Inhalation, Every 4 hours PRN    allopurinoL (ZYLOPRIM) 100 mg, Oral, 2 times daily    AMITIZA 24 mcg, Oral, Every morning    ascorbic acid (vitamin C) (VITAMIN C) 1,000 mg, Oral, Every morning    azelastine (ASTELIN) 137 mcg (0.1 %) nasal spray 1 spray, 2 times daily    biotin 10,000 mcg Cap Oral, 2 times daily    calcium carb/magnesium oxid/D3 (CALCIUM MAGNESIUM + D ORAL) 1 tablet, Oral, 2 times daily    cetirizine (ZYRTEC) 10 mg, Oral, Every morning     "cholecalciferol (vitamin D3) 5,000 Units, Oral, Every morning    famotidine/Ca carb/mag hydrox (PEPCID COMPLETE ORAL) 10 mg, Oral, Every morning    gabapentin (NEURONTIN) 300 MG capsule 1 capsule by mouth in the morning and in the afternoon, 2 capsules by mouth at bedtime    metFORMIN (GLUCOPHAGE) 500 MG tablet 2 times daily    niacin 250 mg, Oral, 2 times daily    niacinamide 500 mg Tab 1 tablet, Oral, 2 times daily    NON FORMULARY MEDICATION 1 tablet, Oral, 2 times daily, Calcium 500+ Vit D and Magnesium    rosuvastatin (CRESTOR) 40 mg, Oral, Nightly    SYMBICORT 160-4.5 mcg/actuation HFAA 1 puff, Inhalation, Every 12 hours    tamsulosin (FLOMAX) 0.4 mg Cap 1 capsule, Oral, Every morning    temazepam (RESTORIL) 15 mg, Oral, Nightly PRN    traMADoL (ULTRAM) 50 mg, Oral, Every 6 hours PRN    triamterene-hydrochlorothiazide 37.5-25 mg (MAXZIDE-25) 37.5-25 mg per tablet 1 tablet, Oral, Every morning    VASCEPA 1 gram Cap 1 capsule, Oral, 2 times daily        Review of Systems:    Review of Systems   Constitutional:  Negative for chills and fever.   HENT:  Negative for nosebleeds and sore throat.    Eyes:  Negative for pain and visual disturbance.   Respiratory:  Negative for cough, chest tightness and shortness of breath.    Cardiovascular:  Negative for chest pain.   Gastrointestinal:  Negative for diarrhea, nausea and vomiting.   Genitourinary:  Negative for difficulty urinating, dysuria and hematuria.   Musculoskeletal:  Positive for neck stiffness. Negative for gait problem and myalgias.   Skin:  Negative for rash.   Neurological:  Positive for weakness. Negative for dizziness, facial asymmetry and headaches.   Psychiatric/Behavioral:  Negative for confusion and sleep disturbance. The patient is not nervous/anxious.        OBJECTIVE:       Visit Vitals  /73   Pulse 86   Resp 16   Ht 5' 5" (1.651 m)   Wt 63 kg (139 lb)   LMP  (LMP Unknown)   BMI 23.13 kg/m²        General:  Pleasant, Well-nourished, " Well-groomed.    Lungs:  Breathing is quiet with non-labored respirations.    Musculoskeletal:  Cervical ROM:  Moderately limited with extension, mildly limited with bilateral rotation.  Incision is well healed.    Neurological:    Alert and oriented to person, place, time, and situation.  Muscle strength against resistance:  Strength  Deltoids Triceps Biceps Wrist Extension Wrist Flexion Intrinsics   Upper: R 5/5 5/5 5/5 5/5  5/5    L 5/5 5/5 5/5 5/5  5/5   Gait is normal.   Coordination is normal.      Imaging:  Cervical x-rays were obtained on 08/24/2023.  There is solid fusion at C5-6 and C6-7.  There is slight retrolisthesis of C4 on C5 with extension.  There is anterolisthesis at C7-T1 which does not change with flexion or extension.      ASSESSMENT:     1. Status post cervical spinal fusion        2. Cervical spondylosis with myelopathy        3. Age-related osteoporosis without current pathological fracture           PLAN:       Overall, the patient is doing very well in regards to her cervical spine.  I discussed stretching and range-of-motion exercises for her neck.  She voiced understanding.  Her vitamin-D level is high.  I have advised her to discontinue the vitamin-D for 2 weeks.  She will resume only the calcium, vitamin-D, and magnesium supplement thereafter.  She will return for follow-up on an as-needed basis.      A total of 16 minutes was spent face-to-face with the patient during this encounter.  Over half of that time was spent on counseling and coordination of care.  Additional time was used to reviewed the patient's chart, cervical x-ray images comparing with old, and work on office note.      Faiza Corrales PA-C      Disclaimer:  This note is prepared using voice recognition software and as such is likely to have errors despite attempts at proofreading.

## 2023-08-31 ENCOUNTER — CLINICAL SUPPORT (OUTPATIENT)
Dept: REHABILITATION | Facility: HOSPITAL | Age: 63
End: 2023-08-31
Payer: COMMERCIAL

## 2023-08-31 DIAGNOSIS — Z74.09 IMPAIRED FUNCTIONAL MOBILITY AND ACTIVITY TOLERANCE: ICD-10-CM

## 2023-08-31 DIAGNOSIS — M25.512 LEFT SHOULDER PAIN, UNSPECIFIED CHRONICITY: Primary | ICD-10-CM

## 2023-08-31 PROCEDURE — 97110 THERAPEUTIC EXERCISES: CPT

## 2023-08-31 NOTE — PLAN OF CARE
Physical Therapy Treatment Note      Name: Dayana Mejia  Clinic Number: [id number]     Therapy Diagnosis:        Encounter Diagnoses   Name Primary?    Left shoulder pain, unspecified chronicity Yes    Impaired functional mobility and activity tolerance        Physician: Tavo Villalta MD     Visit Date: 8/24/2023     Physician Orders: PT Eval and Treat  Medical Diagnosis from Referral: Unspecific injury of muscle(s) and tendon(s) of the left shoulder rotator cuff  Evaluation Date: 7/25/2023  Authorization Period Expiration: Medically necessary  Plan of Care Expiration: 11/25/2023  Visit # / Visits authorized: 11/ 36     Time In: 0928  Time Out: 1010  Total Billable Time: 42 minutes     Surgery: Left shoulder arthroscopic bursectomy, rotator cuff debridement, labral debridement, biceps release 7/6/23  Orthopedic Precautions: Patient states MD has left progression to PT discretion  Pertinent History: See PMH in chart     Subjective      Patient reports: No significant changes, no soreness following previous session. Current 2-3/10 pain left shoulder, but she is tolerating increased activity at home.     CMS Impairment/Limitation/Restriction for FOTO Survey  Therapist reviewed FOTO scores for Dayana Mejia on 7/25/2023. FOTO documents entered into EPIC - see Media section.  Patient's Physical FS Primary Measure: 35  Risk Adjusted Statistical FOTO: 25  Limitation Score: 65%  Category: Carrying  DASH: 73.3% disability        Therapist reviewed FOTO scores for Dayana Mejia on 8/15/2023. FOTO documents entered into EPIC - see Media section.  Patient's Physical FS Primary Measure: 54  Risk Adjusted Statistical FOTO: 25  Limitation Score: 46%  Category: Carrying  DASH: 33.3% disability       Therapist reviewed FOTO scores for Dayana Mejia on 9/TBD/2023. FOTO documents entered into EPIC - see Media section.  Patient's Physical FS Primary Measure: TBD  Risk Adjusted Statistical FOTO: 25  Limitation Score: TBD%  Category:  Carrying  DASH: TBD% disability         Objective      Dayana received the following treatment:       Time Activities   Manual   PROM left shoulder flexion/scaption, passive left pec stretch, STM to left scapular muscles     Biofreeze to left scapula and shoulder         TherAct       TherEx 42 min NuStep, wall towel wipes, bicep curl (left), Shoulder dumbbell x 3 (flx, scaption, abd) to 90 deg, bent row, bent shoulder ext, shoulder press (flexion, abduction)      Biofreeze and cold pack to left shoulder          E-Stim   IFC to left UE for edema control and muscle relaxation - channel 1 middle third lateral humerus (negative) and anterior delt (positive), channel 2 infraspinatus (positive) and teres major (negative)                  Home Exercises Provided and Patient Education Provided      Education provided:   -Plan of care, HEP, acclimation to ADLs with expected soreness in left arm     Assessment      Strengthening and stabilization to left shoulder/scapular complex. Shifted activities to involve more lifting, as this is where she has the most difficulty. As her body accomodates the load demand, she should experience less soreness in left arm.     Patient prognosis is Good.      Anticipated barriers to physical therapy: None     Goals: Dayana is working towards her goals.  Short Term Goals: 6 weeks   Patient will report at least 10% increase in functional capacity from initial QuickDash score to indicate clinically significant functional improvement (goal met)  Patient will report at least 10 point increase on initial FOTO score to indicate clinically significant functional improvement (goal met)  Patient will demonstrate full left shoulder active flexion and abduction in order to improve her functional capacity        Long Term Goals: 12 weeks   Patient will report at least 20% increase in functional capacity from initial QuickDash score to indicate clinically significant functional improvement (goal  met)  Patient will report at least 20 point increase on initial FOTO score to indicate clinically significant functional improvement (progressing)  Patient will demonstrate 5/5 strength all left shoulder motions to improve her functional strength during ADL performance     Plan      2-3x/week x 12 weeks     Angelica Thomas, PT

## 2023-09-05 ENCOUNTER — CLINICAL SUPPORT (OUTPATIENT)
Dept: REHABILITATION | Facility: HOSPITAL | Age: 63
End: 2023-09-05
Payer: COMMERCIAL

## 2023-09-05 DIAGNOSIS — Z74.09 IMPAIRED FUNCTIONAL MOBILITY AND ACTIVITY TOLERANCE: ICD-10-CM

## 2023-09-05 DIAGNOSIS — M47.12 CERVICAL SPONDYLOSIS WITH MYELOPATHY: Primary | ICD-10-CM

## 2023-09-05 DIAGNOSIS — Z98.1 STATUS POST CERVICAL SPINAL FUSION: ICD-10-CM

## 2023-09-05 DIAGNOSIS — M25.512 LEFT SHOULDER PAIN, UNSPECIFIED CHRONICITY: Primary | ICD-10-CM

## 2023-09-05 PROCEDURE — 97110 THERAPEUTIC EXERCISES: CPT

## 2023-09-05 RX ORDER — METHOCARBAMOL 500 MG/1
500-1000 TABLET, FILM COATED ORAL 3 TIMES DAILY PRN
Qty: 40 TABLET | Refills: 2 | Status: SHIPPED | OUTPATIENT
Start: 2023-09-05 | End: 2023-09-25

## 2023-09-05 NOTE — TELEPHONE ENCOUNTER
Received a fax from the pharmacy requesting a refill of methocarbamol.  Rx e-scribed to [facility] [address] in [place].

## 2023-09-05 NOTE — PLAN OF CARE
Physical Therapy Treatment Note      Name: Dayana Mejia  Clinic Number: [id number]     Therapy Diagnosis:        Encounter Diagnoses   Name Primary?    Left shoulder pain, unspecified chronicity Yes    Impaired functional mobility and activity tolerance        Physician: Tavo Villalta MD     Visit Date: 8/24/2023     Physician Orders: PT Eval and Treat  Medical Diagnosis from Referral: Unspecific injury of muscle(s) and tendon(s) of the left shoulder rotator cuff  Evaluation Date: 7/25/2023  Authorization Period Expiration: Medically necessary  Plan of Care Expiration: 11/25/2023  Visit # / Visits authorized: 12/ 36     Time In: 1359  Time Out: 1438  Total Billable Time: 39 minutes     Surgery: Left shoulder arthroscopic bursectomy, rotator cuff debridement, labral debridement, biceps release 7/6/23  Orthopedic Precautions: Patient states MD has left progression to PT discretion  Pertinent History: See PMH in chart     Subjective      Patient reports: No significant changes, no soreness following previous session. Current 1/10 pain left shoulder, and she is tolerating increased activity at home. Goes to see MD for follow up on 9/18/23.     CMS Impairment/Limitation/Restriction for FOTO Survey  Therapist reviewed FOTO scores for Dayana Mejia on 7/25/2023. FOTO documents entered into EPIC - see Media section.  Patient's Physical FS Primary Measure: 35  Risk Adjusted Statistical FOTO: 25  Limitation Score: 65%  Category: Carrying  DASH: 73.3% disability        Therapist reviewed FOTO scores for Dayana Mejia on 8/15/2023. FOTO documents entered into EPIC - see Media section.  Patient's Physical FS Primary Measure: 54  Risk Adjusted Statistical FOTO: 25  Limitation Score: 46%  Category: Carrying  DASH: 33.3% disability       Therapist reviewed FOTO scores for Dayana Mejia on 9/5/2023. FOTO documents entered into EPIC - see Media section.  Patient's Physical FS Primary Measure: 67  Risk Adjusted Statistical FOTO:  25  Limitation Score: 33%  Category: Carrying  DASH: 14.2% disability         Objective      Dayana received the following treatment:       Time Activities   Manual   PROM left shoulder flexion/scaption, passive left pec stretch, STM to left scapular muscles     Biofreeze to left scapula and shoulder         TherAct       TherEx 39 min NuStep, AROM (flexion, scaption), bicep curl (left), Shoulder dumbbell x 3 (flx, scaption, abd) to 90 deg, bent row, bent shoulder ext, shoulder press (flexion, abduction)      Biofreeze and cold pack to left shoulder          E-Stim   IFC to left UE for edema control and muscle relaxation - channel 1 middle third lateral humerus (negative) and anterior delt (positive), channel 2 infraspinatus (positive) and teres major (negative)                  Home Exercises Provided and Patient Education Provided      Education provided:   -Plan of care, HEP, acclimation to ADLs with expected soreness in left arm     Assessment      Strengthening and stabilization to left shoulder/scapular complex. Shifted activities to involve more lifting, as this is where she has the most difficulty. As her body accomodates the load demand, she should experience less soreness in left arm.     Patient prognosis is Good.      Anticipated barriers to physical therapy: None     Goals: Dayana is working towards her goals.  Short Term Goals: 6 weeks   Patient will report at least 10% increase in functional capacity from initial QuickDash score to indicate clinically significant functional improvement (goal met)  Patient will report at least 10 point increase on initial FOTO score to indicate clinically significant functional improvement (goal met)  Patient will demonstrate full left shoulder active flexion and abduction in order to improve her functional capacity (goal met)        Long Term Goals: 12 weeks   Patient will report at least 20% increase in functional capacity from initial QuickDash score to indicate  clinically significant functional improvement (goal met)  Patient will report at least 20 point increase on initial FOTO score to indicate clinically significant functional improvement (goal met)  Patient will demonstrate 5/5 strength all left shoulder motions to improve her functional strength during ADL performance (progressing)     Plan      2-3x/week x 12 weeks     Angelica Thomas, PT

## 2023-09-07 ENCOUNTER — CLINICAL SUPPORT (OUTPATIENT)
Dept: REHABILITATION | Facility: HOSPITAL | Age: 63
End: 2023-09-07
Payer: COMMERCIAL

## 2023-09-07 DIAGNOSIS — Z74.09 IMPAIRED FUNCTIONAL MOBILITY AND ACTIVITY TOLERANCE: ICD-10-CM

## 2023-09-07 DIAGNOSIS — M25.512 LEFT SHOULDER PAIN, UNSPECIFIED CHRONICITY: Primary | ICD-10-CM

## 2023-09-07 PROCEDURE — 97110 THERAPEUTIC EXERCISES: CPT

## 2023-09-07 NOTE — PLAN OF CARE
Physical Therapy Treatment Note      Name: Dayana Mejia  Clinic Number: [id number]     Therapy Diagnosis:        Encounter Diagnoses   Name Primary?    Left shoulder pain, unspecified chronicity Yes    Impaired functional mobility and activity tolerance        Physician: Tavo Villalta MD     Visit Date: 8/24/2023     Physician Orders: PT Eval and Treat  Medical Diagnosis from Referral: Unspecific injury of muscle(s) and tendon(s) of the left shoulder rotator cuff  Evaluation Date: 7/25/2023  Authorization Period Expiration: Medically necessary  Plan of Care Expiration: 11/25/2023  Visit # / Visits authorized: 13/ 36     Time In: 1136  Time Out: 1200  Total Billable Time: 24 minutes     Surgery: Left shoulder arthroscopic bursectomy, rotator cuff debridement, labral debridement, biceps release 7/6/23  Orthopedic Precautions: Patient states MD has left progression to PT discretion  Pertinent History: See PMH in chart     Subjective      Patient reports: No significant changes, no soreness following previous session. Current 0-1/10 pain left shoulder, and she is tolerating increased activity at home. Goes to see MD for follow up on 9/18/23.     CMS Impairment/Limitation/Restriction for FOTO Survey  Therapist reviewed FOTO scores for Dayana Mejia on 7/25/2023. FOTO documents entered into EPIC - see Media section.  Patient's Physical FS Primary Measure: 35  Risk Adjusted Statistical FOTO: 25  Limitation Score: 65%  Category: Carrying  DASH: 73.3% disability        Therapist reviewed FOTO scores for Dayana Mejia on 8/15/2023. FOTO documents entered into EPIC - see Media section.  Patient's Physical FS Primary Measure: 54  Risk Adjusted Statistical FOTO: 25  Limitation Score: 46%  Category: Carrying  DASH: 33.3% disability       Therapist reviewed FOTO scores for Dayana Mejia on 9/5/2023. FOTO documents entered into EPIC - see Media section.  Patient's Physical FS Primary Measure: 67  Risk Adjusted Statistical FOTO:  25  Limitation Score: 33%  Category: Carrying  DASH: 14.2% disability         Objective      Dayana received the following treatment:       Time Activities   Manual   PROM left shoulder flexion/scaption, passive left pec stretch, STM to left scapular muscles     Biofreeze to left scapula and shoulder         TherAct       TherEx 24 min NuStep, AROM (flexion, scaption), bicep curl (left), Shoulder dumbbell x 3 (flx, scaption, abd) to 90 deg, bent row, bent shoulder ext, shoulder press (flexion, abduction)          E-Stim   IFC to left UE for edema control and muscle relaxation - channel 1 middle third lateral humerus (negative) and anterior delt (positive), channel 2 infraspinatus (positive) and teres major (negative)                  Home Exercises Provided and Patient Education Provided      Education provided:   -Plan of care, HEP, acclimation to ADLs with expected soreness in left arm     Assessment      Long term with reduced left shoulder pain and soreness despite increased load during PT sessions. As anticipated, she has accommodated to load demand. Strengthening and stabilization to left shoulder/scapular complex appears to lead to positive results. Continuing to involve more lifting, as this is where she has the most difficulty     Patient prognosis is Good.      Anticipated barriers to physical therapy: None     Goals: Dayana is working towards her goals.  Short Term Goals: 6 weeks   Patient will report at least 10% increase in functional capacity from initial QuickDash score to indicate clinically significant functional improvement (goal met)  Patient will report at least 10 point increase on initial FOTO score to indicate clinically significant functional improvement (goal met)  Patient will demonstrate full left shoulder active flexion and abduction in order to improve her functional capacity (goal met)        Long Term Goals: 12 weeks   Patient will report at least 20% increase in functional capacity from  initial QuickDash score to indicate clinically significant functional improvement (goal met)  Patient will report at least 20 point increase on initial FOTO score to indicate clinically significant functional improvement (goal met)  Patient will demonstrate 5/5 strength all left shoulder motions to improve her functional strength during ADL performance (progressing)     Plan      2-3x/week x 12 weeks     Angelica Thomas, PT

## 2023-09-12 ENCOUNTER — CLINICAL SUPPORT (OUTPATIENT)
Dept: REHABILITATION | Facility: HOSPITAL | Age: 63
End: 2023-09-12
Payer: COMMERCIAL

## 2023-09-12 DIAGNOSIS — M25.512 LEFT SHOULDER PAIN, UNSPECIFIED CHRONICITY: Primary | ICD-10-CM

## 2023-09-12 DIAGNOSIS — Z74.09 IMPAIRED FUNCTIONAL MOBILITY AND ACTIVITY TOLERANCE: ICD-10-CM

## 2023-09-12 PROCEDURE — 97110 THERAPEUTIC EXERCISES: CPT

## 2023-09-12 NOTE — PLAN OF CARE
Physical Therapy Treatment Note      Name: Dayana Mejia  Clinic Number: [id number]     Therapy Diagnosis:        Encounter Diagnoses   Name Primary?    Left shoulder pain, unspecified chronicity Yes    Impaired functional mobility and activity tolerance        Physician: Tavo Villalta MD     Visit Date: 8/24/2023     Physician Orders: PT Eval and Treat  Medical Diagnosis from Referral: Unspecific injury of muscle(s) and tendon(s) of the left shoulder rotator cuff  Evaluation Date: 7/25/2023  Authorization Period Expiration: Medically necessary  Plan of Care Expiration: 11/25/2023  Visit # / Visits authorized: 14/ 36     Time In: 1415  Time Out: 1444  Total Billable Time: 29 minutes     Surgery: Left shoulder arthroscopic bursectomy, rotator cuff debridement, labral debridement, biceps release 7/6/23  Orthopedic Precautions: Patient states MD has left progression to PT discretion  Pertinent History: See PMH in chart     Subjective      Patient reports: No significant changes, no soreness following previous session. Current 0/10 pain left shoulder, but just sore. Goes to see MD for follow up on 9/18/23.     CMS Impairment/Limitation/Restriction for FOTO Survey  Therapist reviewed FOTO scores for Dayana Mejia on 7/25/2023. FOTO documents entered into EPIC - see Media section.  Patient's Physical FS Primary Measure: 35  Risk Adjusted Statistical FOTO: 25  Limitation Score: 65%  Category: Carrying  DASH: 73.3% disability        Therapist reviewed FOTO scores for Dayana Mejia on 8/15/2023. FOTO documents entered into EPIC - see Media section.  Patient's Physical FS Primary Measure: 54  Risk Adjusted Statistical FOTO: 25  Limitation Score: 46%  Category: Carrying  DASH: 33.3% disability       Therapist reviewed FOTO scores for Dayana Mejia on 9/5/2023. FOTO documents entered into EPIC - see Media section.  Patient's Physical FS Primary Measure: 67  Risk Adjusted Statistical FOTO: 25  Limitation Score: 33%  Category:  Carrying  DASH: 14.2% disability         Objective      Dayana received the following treatment:       Time Activities   Manual   PROM left shoulder flexion/scaption, passive left pec stretch, STM to left scapular muscles     Biofreeze to left scapula and shoulder         TherAct       TherEx 29 min NuStep, bicep curl (left), Shoulder dumbbell x 3 (flx, scaption, abd) to 140 deg, bent shoulder ext, shoulder press to full ROM (flexion double hand, abduction single hand)          E-Stim   IFC to left UE for edema control and muscle relaxation - channel 1 middle third lateral humerus (negative) and anterior delt (positive), channel 2 infraspinatus (positive) and teres major (negative)                  Home Exercises Provided and Patient Education Provided      Education provided:   -Plan of care, HEP, acclimation to ADLs with expected soreness in left arm     Assessment      Long term with reduced left shoulder pain and soreness despite increased load during PT sessions. As anticipated, she has accommodated to load demand. Strengthening and stabilization to left shoulder/scapular complex appears to lead to positive results. Continuing to involve more lifting, as this is where she has the most difficulty     Patient prognosis is Good.      Anticipated barriers to physical therapy: None     Goals: Dayana is working towards her goals.  Short Term Goals: 6 weeks   Patient will report at least 10% increase in functional capacity from initial QuickDash score to indicate clinically significant functional improvement (goal met)  Patient will report at least 10 point increase on initial FOTO score to indicate clinically significant functional improvement (goal met)  Patient will demonstrate full left shoulder active flexion and abduction in order to improve her functional capacity (goal met)        Long Term Goals: 12 weeks   Patient will report at least 20% increase in functional capacity from initial QuickDash score to indicate  clinically significant functional improvement (goal met)  Patient will report at least 20 point increase on initial FOTO score to indicate clinically significant functional improvement (goal met)  Patient will demonstrate 5/5 strength all left shoulder motions to improve her functional strength during ADL performance (progressing)     Plan      2-3x/week x 12 weeks     Angelica Thomas, PT

## 2023-09-14 ENCOUNTER — CLINICAL SUPPORT (OUTPATIENT)
Dept: REHABILITATION | Facility: HOSPITAL | Age: 63
End: 2023-09-14
Payer: COMMERCIAL

## 2023-09-14 DIAGNOSIS — Z74.09 IMPAIRED FUNCTIONAL MOBILITY AND ACTIVITY TOLERANCE: ICD-10-CM

## 2023-09-14 DIAGNOSIS — M25.512 LEFT SHOULDER PAIN, UNSPECIFIED CHRONICITY: Primary | ICD-10-CM

## 2023-09-14 PROCEDURE — 97110 THERAPEUTIC EXERCISES: CPT

## 2023-09-14 NOTE — PLAN OF CARE
Physical Therapy Treatment Note      Name: Dayana Mejia  Clinic Number: [id number]     Therapy Diagnosis:        Encounter Diagnoses   Name Primary?    Left shoulder pain, unspecified chronicity Yes    Impaired functional mobility and activity tolerance        Physician: Tavo Villalta MD     Visit Date: 8/24/2023     Physician Orders: PT Eval and Treat  Medical Diagnosis from Referral: Unspecific injury of muscle(s) and tendon(s) of the left shoulder rotator cuff  Evaluation Date: 7/25/2023  Authorization Period Expiration: Medically necessary  Plan of Care Expiration: 11/25/2023  Visit # / Visits authorized: 15/ 36     Time In: 0923  Time Out: 0952  Total Billable Time: 29 minutes     Surgery: Left shoulder arthroscopic bursectomy, rotator cuff debridement, labral debridement, biceps release 7/6/23  Orthopedic Precautions: Patient states MD has left progression to PT discretion  Pertinent History: See PMH in chart     Subjective      Patient reports: No significant changes, no soreness following previous session. Current 0/10 pain left shoulder, but just sore. Goes to see MD for follow up on 9/18/23.     CMS Impairment/Limitation/Restriction for FOTO Survey  Therapist reviewed FOTO scores for Dayana Mejia on 7/25/2023. FOTO documents entered into EPIC - see Media section.  Patient's Physical FS Primary Measure: 35  Risk Adjusted Statistical FOTO: 25  Limitation Score: 65%  Category: Carrying  DASH: 73.3% disability        Therapist reviewed FOTO scores for Dayana Mejia on 8/15/2023. FOTO documents entered into EPIC - see Media section.  Patient's Physical FS Primary Measure: 54  Risk Adjusted Statistical FOTO: 25  Limitation Score: 46%  Category: Carrying  DASH: 33.3% disability       Therapist reviewed FOTO scores for Dayana Mejia on 9/5/2023. FOTO documents entered into EPIC - see Media section.  Patient's Physical FS Primary Measure: 67  Risk Adjusted Statistical FOTO: 25  Limitation Score: 33%  Category:  Carrying  DASH: 14.2% disability         Objective      Dayana received the following treatment:       Time Activities   Manual   PROM left shoulder flexion/scaption, passive left pec stretch, STM to left scapular muscles     Biofreeze to left scapula and shoulder         TherAct       TherEx 29 min NuStep, bicep curl (left), Shoulder dumbbell x 3 (flx, scaption, abd) to 140 deg, bent shoulder ext, shoulder press to full ROM (flexion double hand, abduction single hand)          E-Stim   IFC to left UE for edema control and muscle relaxation - channel 1 middle third lateral humerus (negative) and anterior delt (positive), channel 2 infraspinatus (positive) and teres major (negative)                  Home Exercises Provided and Patient Education Provided      Education provided:   -Plan of care, HEP, acclimation to ADLs with expected soreness in left arm     Assessment      Long term with reduced left shoulder pain and soreness despite increased load during PT sessions. As anticipated, she has accommodated to load demand. Strengthening and stabilization to left shoulder/scapular complex appears to lead to positive results. Continuing to involve more lifting, as this is where she has the most difficulty     Patient prognosis is Good.      Anticipated barriers to physical therapy: None     Goals: Dayana is working towards her goals.  Short Term Goals: 6 weeks   Patient will report at least 10% increase in functional capacity from initial QuickDash score to indicate clinically significant functional improvement (goal met)  Patient will report at least 10 point increase on initial FOTO score to indicate clinically significant functional improvement (goal met)  Patient will demonstrate full left shoulder active flexion and abduction in order to improve her functional capacity (goal met)        Long Term Goals: 12 weeks   Patient will report at least 20% increase in functional capacity from initial QuickDash score to indicate  clinically significant functional improvement (goal met)  Patient will report at least 20 point increase on initial FOTO score to indicate clinically significant functional improvement (goal met)  Patient will demonstrate 5/5 strength all left shoulder motions to improve her functional strength during ADL performance (progressing)     Plan      2-3x/week x 12 weeks     Angelica Thomas, PT

## 2023-09-15 ENCOUNTER — LAB VISIT (OUTPATIENT)
Dept: LAB | Facility: HOSPITAL | Age: 63
End: 2023-09-15
Attending: INTERNAL MEDICINE
Payer: COMMERCIAL

## 2023-09-15 DIAGNOSIS — E03.9 MYXEDEMA HEART DISEASE: ICD-10-CM

## 2023-09-15 DIAGNOSIS — I51.9 MYXEDEMA HEART DISEASE: ICD-10-CM

## 2023-09-15 DIAGNOSIS — N18.30 CHRONIC KIDNEY DISEASE, STAGE III (MODERATE): ICD-10-CM

## 2023-09-15 DIAGNOSIS — I43 DILATED CARDIOMYOPATHY SECONDARY TO ELECTROLYTE DEFICIENCY: ICD-10-CM

## 2023-09-15 DIAGNOSIS — D63.1 ANEMIA OF CHRONIC RENAL FAILURE, UNSPECIFIED CKD STAGE: Primary | ICD-10-CM

## 2023-09-15 DIAGNOSIS — E55.9 AVITAMINOSIS D: ICD-10-CM

## 2023-09-15 DIAGNOSIS — E87.8 DILATED CARDIOMYOPATHY SECONDARY TO ELECTROLYTE DEFICIENCY: ICD-10-CM

## 2023-09-15 DIAGNOSIS — N18.9 ANEMIA OF CHRONIC RENAL FAILURE, UNSPECIFIED CKD STAGE: Primary | ICD-10-CM

## 2023-09-15 DIAGNOSIS — E21.3 HYPERPARATHYROIDISM, UNSPECIFIED: ICD-10-CM

## 2023-09-15 LAB
ALBUMIN SERPL-MCNC: 4 G/DL (ref 3.4–4.8)
ALBUMIN/GLOB SERPL: 1.3 RATIO (ref 1.1–2)
ALP SERPL-CCNC: 72 UNIT/L (ref 40–150)
ALT SERPL-CCNC: 15 UNIT/L (ref 0–55)
APPEARANCE UR: CLEAR
AST SERPL-CCNC: 14 UNIT/L (ref 5–34)
BACTERIA #/AREA URNS AUTO: ABNORMAL /HPF
BASOPHILS # BLD AUTO: 0.06 X10(3)/MCL
BASOPHILS NFR BLD AUTO: 0.6 %
BILIRUB SERPL-MCNC: 0.4 MG/DL
BILIRUB UR QL STRIP.AUTO: NEGATIVE
BUN SERPL-MCNC: 11 MG/DL (ref 9.8–20.1)
CALCIUM SERPL-MCNC: 10.1 MG/DL (ref 8.4–10.2)
CHLORIDE SERPL-SCNC: 103 MMOL/L (ref 98–107)
CO2 SERPL-SCNC: 30 MMOL/L (ref 23–31)
COLOR UR: YELLOW
CREAT SERPL-MCNC: 0.79 MG/DL (ref 0.55–1.02)
CREAT UR-MCNC: 52.4 MG/DL (ref 45–106)
DEPRECATED CALCIDIOL+CALCIFEROL SERPL-MC: 117.8 NG/ML (ref 30–80)
EOSINOPHIL # BLD AUTO: 0.42 X10(3)/MCL (ref 0–0.9)
EOSINOPHIL NFR BLD AUTO: 4.5 %
ERYTHROCYTE [DISTWIDTH] IN BLOOD BY AUTOMATED COUNT: 12.7 % (ref 11.5–17)
GFR SERPLBLD CREATININE-BSD FMLA CKD-EPI: >60 MLS/MIN/1.73/M2
GLOBULIN SER-MCNC: 3.1 GM/DL (ref 2.4–3.5)
GLUCOSE SERPL-MCNC: 84 MG/DL (ref 82–115)
GLUCOSE UR QL STRIP.AUTO: NEGATIVE
HCT VFR BLD AUTO: 46.7 % (ref 37–47)
HGB BLD-MCNC: 16.2 G/DL (ref 12–16)
IMM GRANULOCYTES # BLD AUTO: 0.02 X10(3)/MCL (ref 0–0.04)
IMM GRANULOCYTES NFR BLD AUTO: 0.2 %
KETONES UR QL STRIP.AUTO: NEGATIVE
LEUKOCYTE ESTERASE UR QL STRIP.AUTO: NEGATIVE
LYMPHOCYTES # BLD AUTO: 2.32 X10(3)/MCL (ref 0.6–4.6)
LYMPHOCYTES NFR BLD AUTO: 24.7 %
MAGNESIUM SERPL-MCNC: 1.9 MG/DL (ref 1.6–2.6)
MCH RBC QN AUTO: 32.1 PG (ref 27–31)
MCHC RBC AUTO-ENTMCNC: 34.7 G/DL (ref 33–36)
MCV RBC AUTO: 92.5 FL (ref 80–94)
MONOCYTES # BLD AUTO: 0.62 X10(3)/MCL (ref 0.1–1.3)
MONOCYTES NFR BLD AUTO: 6.6 %
NEUTROPHILS # BLD AUTO: 5.94 X10(3)/MCL (ref 2.1–9.2)
NEUTROPHILS NFR BLD AUTO: 63.4 %
NITRITE UR QL STRIP.AUTO: NEGATIVE
NRBC BLD AUTO-RTO: 0 %
PH UR STRIP.AUTO: 7 [PH]
PHOSPHATE SERPL-MCNC: 3.8 MG/DL (ref 2.3–4.7)
PLATELET # BLD AUTO: 231 X10(3)/MCL (ref 130–400)
PMV BLD AUTO: 10.1 FL (ref 7.4–10.4)
POTASSIUM SERPL-SCNC: 3.3 MMOL/L (ref 3.5–5.1)
PROT SERPL-MCNC: 7.1 GM/DL (ref 5.8–7.6)
PROT UR QL STRIP.AUTO: ABNORMAL
PROT UR STRIP-MCNC: 25.3 MG/DL
PTH-INTACT SERPL-MCNC: 39.9 PG/ML (ref 8.7–77)
RBC # BLD AUTO: 5.05 X10(6)/MCL (ref 4.2–5.4)
RBC #/AREA URNS AUTO: ABNORMAL /HPF
RBC UR QL AUTO: NEGATIVE
SODIUM SERPL-SCNC: 142 MMOL/L (ref 136–145)
SP GR UR STRIP.AUTO: 1.01 (ref 1–1.03)
SQUAMOUS #/AREA URNS AUTO: ABNORMAL /HPF
TSH SERPL-ACNC: 0.81 UIU/ML (ref 0.35–4.94)
URINE PROTEIN/CREATININE RATIO (OHS): 0.5
UROBILINOGEN UR STRIP-ACNC: 0.2
WBC # SPEC AUTO: 9.38 X10(3)/MCL (ref 4.5–11.5)
WBC #/AREA URNS AUTO: ABNORMAL /HPF

## 2023-09-15 PROCEDURE — 85025 COMPLETE CBC W/AUTO DIFF WBC: CPT

## 2023-09-15 PROCEDURE — 81001 URINALYSIS AUTO W/SCOPE: CPT

## 2023-09-15 PROCEDURE — 83735 ASSAY OF MAGNESIUM: CPT

## 2023-09-15 PROCEDURE — 84443 ASSAY THYROID STIM HORMONE: CPT

## 2023-09-15 PROCEDURE — 80053 COMPREHEN METABOLIC PANEL: CPT

## 2023-09-15 PROCEDURE — 84100 ASSAY OF PHOSPHORUS: CPT

## 2023-09-15 PROCEDURE — 82306 VITAMIN D 25 HYDROXY: CPT

## 2023-09-15 PROCEDURE — 36415 COLL VENOUS BLD VENIPUNCTURE: CPT

## 2023-09-15 PROCEDURE — 82570 ASSAY OF URINE CREATININE: CPT

## 2023-09-15 PROCEDURE — 83970 ASSAY OF PARATHORMONE: CPT

## 2023-09-19 ENCOUNTER — CLINICAL SUPPORT (OUTPATIENT)
Dept: REHABILITATION | Facility: HOSPITAL | Age: 63
End: 2023-09-19
Payer: COMMERCIAL

## 2023-09-19 DIAGNOSIS — Z74.09 IMPAIRED FUNCTIONAL MOBILITY AND ACTIVITY TOLERANCE: ICD-10-CM

## 2023-09-19 DIAGNOSIS — M25.512 LEFT SHOULDER PAIN, UNSPECIFIED CHRONICITY: Primary | ICD-10-CM

## 2023-09-19 PROCEDURE — 97110 THERAPEUTIC EXERCISES: CPT

## 2023-10-09 ENCOUNTER — LAB VISIT (OUTPATIENT)
Dept: LAB | Facility: HOSPITAL | Age: 63
End: 2023-10-09
Attending: FAMILY MEDICINE
Payer: COMMERCIAL

## 2023-10-09 DIAGNOSIS — N18.1 CHRONIC KIDNEY DISEASE, STAGE I: Primary | ICD-10-CM

## 2023-10-09 DIAGNOSIS — R80.9 PROTEINURIA, UNSPECIFIED TYPE: ICD-10-CM

## 2023-10-09 DIAGNOSIS — I43 DILATED CARDIOMYOPATHY SECONDARY TO ELECTROLYTE DEFICIENCY: ICD-10-CM

## 2023-10-09 DIAGNOSIS — E87.8 DILATED CARDIOMYOPATHY SECONDARY TO ELECTROLYTE DEFICIENCY: ICD-10-CM

## 2023-10-09 LAB
ALBUMIN SERPL-MCNC: 3.5 G/DL (ref 3.4–4.8)
BUN SERPL-MCNC: 10.2 MG/DL (ref 9.8–20.1)
CALCIUM SERPL-MCNC: 9.2 MG/DL (ref 8.4–10.2)
CHLORIDE SERPL-SCNC: 110 MMOL/L (ref 98–107)
CO2 SERPL-SCNC: 27 MMOL/L (ref 23–31)
CREAT SERPL-MCNC: 0.81 MG/DL (ref 0.55–1.02)
CREAT UR-MCNC: 69.1 MG/DL (ref 45–106)
GFR SERPLBLD CREATININE-BSD FMLA CKD-EPI: >60 MLS/MIN/1.73/M2
GLUCOSE SERPL-MCNC: 118 MG/DL (ref 82–115)
MAGNESIUM SERPL-MCNC: 1.8 MG/DL (ref 1.6–2.6)
PHOSPHATE SERPL-MCNC: 3.2 MG/DL (ref 2.3–4.7)
POTASSIUM SERPL-SCNC: 4 MMOL/L (ref 3.5–5.1)
PROT UR STRIP-MCNC: 54.9 MG/DL
SODIUM SERPL-SCNC: 145 MMOL/L (ref 136–145)
URINE PROTEIN/CREATININE RATIO (OHS): 0.8

## 2023-10-09 PROCEDURE — 36415 COLL VENOUS BLD VENIPUNCTURE: CPT

## 2023-10-09 PROCEDURE — 80069 RENAL FUNCTION PANEL: CPT

## 2023-10-09 PROCEDURE — 83735 ASSAY OF MAGNESIUM: CPT

## 2023-10-09 PROCEDURE — 82570 ASSAY OF URINE CREATININE: CPT

## 2023-10-12 ENCOUNTER — LAB VISIT (OUTPATIENT)
Dept: LAB | Facility: HOSPITAL | Age: 63
End: 2023-10-12
Attending: INTERNAL MEDICINE
Payer: COMMERCIAL

## 2023-10-12 DIAGNOSIS — I51.7 CARDIOMEGALY: Primary | ICD-10-CM

## 2023-10-12 DIAGNOSIS — I34.0 MITRAL VALVE INSUFFICIENCY, UNSPECIFIED ETIOLOGY: ICD-10-CM

## 2023-10-12 LAB
ALBUMIN SERPL-MCNC: 3.5 G/DL (ref 3.4–4.8)
ALBUMIN/GLOB SERPL: 1.2 RATIO (ref 1.1–2)
ALP SERPL-CCNC: 69 UNIT/L (ref 40–150)
ALT SERPL-CCNC: 24 UNIT/L (ref 0–55)
AST SERPL-CCNC: 19 UNIT/L (ref 5–34)
BILIRUB SERPL-MCNC: 0.2 MG/DL
BUN SERPL-MCNC: 12 MG/DL (ref 9.8–20.1)
CALCIUM SERPL-MCNC: 9.6 MG/DL (ref 8.4–10.2)
CHLORIDE SERPL-SCNC: 108 MMOL/L (ref 98–107)
CHOLEST SERPL-MCNC: 182 MG/DL
CHOLEST/HDLC SERPL: 2 {RATIO} (ref 0–5)
CO2 SERPL-SCNC: 31 MMOL/L (ref 23–31)
CREAT SERPL-MCNC: 0.78 MG/DL (ref 0.55–1.02)
GFR SERPLBLD CREATININE-BSD FMLA CKD-EPI: >60 MLS/MIN/1.73/M2
GLOBULIN SER-MCNC: 2.9 GM/DL (ref 2.4–3.5)
GLUCOSE SERPL-MCNC: 117 MG/DL (ref 82–115)
HDLC SERPL-MCNC: 76 MG/DL (ref 35–60)
LDLC SERPL CALC-MCNC: 84 MG/DL (ref 50–140)
POTASSIUM SERPL-SCNC: 3.8 MMOL/L (ref 3.5–5.1)
PROT SERPL-MCNC: 6.4 GM/DL (ref 5.8–7.6)
SODIUM SERPL-SCNC: 146 MMOL/L (ref 136–145)
TRIGL SERPL-MCNC: 110 MG/DL (ref 37–140)
VLDLC SERPL CALC-MCNC: 22 MG/DL

## 2023-10-12 PROCEDURE — 80053 COMPREHEN METABOLIC PANEL: CPT

## 2023-10-12 PROCEDURE — 80061 LIPID PANEL: CPT

## 2023-10-12 PROCEDURE — 36415 COLL VENOUS BLD VENIPUNCTURE: CPT

## 2023-11-13 ENCOUNTER — LAB VISIT (OUTPATIENT)
Dept: LAB | Facility: HOSPITAL | Age: 63
End: 2023-11-13
Attending: INTERNAL MEDICINE
Payer: COMMERCIAL

## 2023-11-13 DIAGNOSIS — N18.1 CHRONIC KIDNEY DISEASE, STAGE I: Primary | ICD-10-CM

## 2023-11-13 DIAGNOSIS — I43 DILATED CARDIOMYOPATHY SECONDARY TO ELECTROLYTE DEFICIENCY: ICD-10-CM

## 2023-11-13 DIAGNOSIS — E87.8 DILATED CARDIOMYOPATHY SECONDARY TO ELECTROLYTE DEFICIENCY: ICD-10-CM

## 2023-11-13 DIAGNOSIS — R80.0 ISOLATED NON-NEPHROTIC PROTEINURIA: ICD-10-CM

## 2023-11-13 LAB
ALBUMIN SERPL-MCNC: 3.9 G/DL (ref 3.4–4.8)
BUN SERPL-MCNC: 21.8 MG/DL (ref 9.8–20.1)
CALCIUM SERPL-MCNC: 9.7 MG/DL (ref 8.4–10.2)
CHLORIDE SERPL-SCNC: 104 MMOL/L (ref 98–107)
CO2 SERPL-SCNC: 28 MMOL/L (ref 23–31)
CREAT SERPL-MCNC: 0.83 MG/DL (ref 0.55–1.02)
CREAT UR-MCNC: 52.9 MG/DL (ref 45–106)
GFR SERPLBLD CREATININE-BSD FMLA CKD-EPI: >60 MLS/MIN/1.73/M2
GLUCOSE SERPL-MCNC: 101 MG/DL (ref 82–115)
MAGNESIUM SERPL-MCNC: 1.9 MG/DL (ref 1.6–2.6)
PHOSPHATE SERPL-MCNC: 3.8 MG/DL (ref 2.3–4.7)
POTASSIUM SERPL-SCNC: 4.4 MMOL/L (ref 3.5–5.1)
PROT UR STRIP-MCNC: 26.1 MG/DL
SODIUM SERPL-SCNC: 138 MMOL/L (ref 136–145)
URINE PROTEIN/CREATININE RATIO (OHS): 0.5

## 2023-11-13 PROCEDURE — 83735 ASSAY OF MAGNESIUM: CPT

## 2023-11-13 PROCEDURE — 80069 RENAL FUNCTION PANEL: CPT

## 2023-11-13 PROCEDURE — 36415 COLL VENOUS BLD VENIPUNCTURE: CPT

## 2023-11-13 PROCEDURE — 82570 ASSAY OF URINE CREATININE: CPT

## 2023-11-16 ENCOUNTER — ANESTHESIA EVENT (OUTPATIENT)
Dept: SURGERY | Facility: HOSPITAL | Age: 63
End: 2023-11-16
Payer: COMMERCIAL

## 2023-11-16 RX ORDER — OMEPRAZOLE 40 MG/1
40 CAPSULE, DELAYED RELEASE ORAL EVERY MORNING
COMMUNITY
Start: 2023-10-28

## 2023-11-16 RX ORDER — SODIUM CHLORIDE, SODIUM LACTATE, POTASSIUM CHLORIDE, CALCIUM CHLORIDE 600; 310; 30; 20 MG/100ML; MG/100ML; MG/100ML; MG/100ML
INJECTION, SOLUTION INTRAVENOUS CONTINUOUS
Status: CANCELLED | OUTPATIENT
Start: 2023-11-16

## 2023-11-16 RX ORDER — RALOXIFENE HYDROCHLORIDE 60 MG/1
60 TABLET, FILM COATED ORAL DAILY
COMMUNITY
Start: 2023-09-25

## 2023-11-16 RX ORDER — SPIRONOLACTONE 25 MG/1
25 TABLET ORAL 2 TIMES DAILY
COMMUNITY
Start: 2023-11-09

## 2023-11-16 RX ORDER — VALSARTAN 40 MG/1
40 TABLET ORAL DAILY
COMMUNITY

## 2023-11-17 ENCOUNTER — ANESTHESIA (OUTPATIENT)
Dept: SURGERY | Facility: HOSPITAL | Age: 63
End: 2023-11-17
Payer: COMMERCIAL

## 2023-11-17 ENCOUNTER — HOSPITAL ENCOUNTER (OUTPATIENT)
Facility: HOSPITAL | Age: 63
Discharge: HOME | End: 2023-11-17
Attending: SURGERY | Admitting: SURGERY
Payer: COMMERCIAL

## 2023-11-17 VITALS
DIASTOLIC BLOOD PRESSURE: 50 MMHG | HEART RATE: 98 BPM | SYSTOLIC BLOOD PRESSURE: 94 MMHG | OXYGEN SATURATION: 98 % | RESPIRATION RATE: 18 BRPM | BODY MASS INDEX: 23.22 KG/M2 | WEIGHT: 139.38 LBS | HEIGHT: 65 IN | TEMPERATURE: 98 F

## 2023-11-17 DIAGNOSIS — R10.9 ABDOMINAL PAIN: ICD-10-CM

## 2023-11-17 DIAGNOSIS — Z12.11 SCREENING FOR COLON CANCER: Primary | ICD-10-CM

## 2023-11-17 LAB — POCT GLUCOSE: 90 MG/DL (ref 70–110)

## 2023-11-17 PROCEDURE — 37000009 HC ANESTHESIA EA ADD 15 MINS: Performed by: SURGERY

## 2023-11-17 PROCEDURE — D9220AH HC ANESTHESIA PROFESSIONAL FEE: Mod: QZ,P3,QS | Performed by: NURSE ANESTHETIST, CERTIFIED REGISTERED

## 2023-11-17 PROCEDURE — 63600175 PHARM REV CODE 636 W HCPCS: Performed by: NURSE ANESTHETIST, CERTIFIED REGISTERED

## 2023-11-17 PROCEDURE — 37000008 HC ANESTHESIA 1ST 15 MINUTES: Performed by: SURGERY

## 2023-11-17 PROCEDURE — 82962 GLUCOSE BLOOD TEST: CPT | Performed by: SURGERY

## 2023-11-17 PROCEDURE — G0105 COLORECTAL SCRN; HI RISK IND: HCPCS | Performed by: SURGERY

## 2023-11-17 PROCEDURE — 00812 ANES LWR INTST SCR COLSC: CPT | Mod: QZ,P3,QS | Performed by: NURSE ANESTHETIST, CERTIFIED REGISTERED

## 2023-11-17 PROCEDURE — 25000003 PHARM REV CODE 250: Performed by: NURSE ANESTHETIST, CERTIFIED REGISTERED

## 2023-11-17 RX ORDER — LIDOCAINE HYDROCHLORIDE 10 MG/ML
INJECTION, SOLUTION EPIDURAL; INFILTRATION; INTRACAUDAL; PERINEURAL
Status: DISCONTINUED | OUTPATIENT
Start: 2023-11-17 | End: 2023-11-17

## 2023-11-17 RX ORDER — SODIUM CHLORIDE 9 MG/ML
INJECTION, SOLUTION INTRAVENOUS CONTINUOUS
Status: DISCONTINUED | OUTPATIENT
Start: 2023-11-17 | End: 2023-11-17 | Stop reason: HOSPADM

## 2023-11-17 RX ORDER — SODIUM CHLORIDE 9 MG/ML
INJECTION, SOLUTION INTRAVENOUS CONTINUOUS
Status: ACTIVE | OUTPATIENT
Start: 2023-11-17

## 2023-11-17 RX ORDER — PROPOFOL 10 MG/ML
VIAL (ML) INTRAVENOUS
Status: DISCONTINUED | OUTPATIENT
Start: 2023-11-17 | End: 2023-11-17

## 2023-11-17 RX ADMIN — PROPOFOL 50 MG: 10 INJECTION, EMULSION INTRAVENOUS at 07:11

## 2023-11-17 RX ADMIN — SODIUM CHLORIDE: 9 INJECTION, SOLUTION INTRAVENOUS at 07:11

## 2023-11-17 RX ADMIN — LIDOCAINE HYDROCHLORIDE 20 MG: 10 INJECTION, SOLUTION EPIDURAL; INFILTRATION; INTRACAUDAL; PERINEURAL at 07:11

## 2023-11-17 NOTE — ANESTHESIA POSTPROCEDURE EVALUATION
Anesthesia Post Evaluation    Patient: Dayana Mejia    Procedure(s) Performed: Procedure(s) (LRB):  COLONOSCOPY (N/A)    Final Anesthesia Type: MAC      Patient participation: Yes- Able to Participate  Level of consciousness: awake and alert  Post-procedure vital signs: reviewed and stable  Pain management: adequate  Airway patency: patent    PONV status at discharge: No PONV  Anesthetic complications: no      Cardiovascular status: blood pressure returned to baseline  Respiratory status: unassisted  Hydration status: euvolemic  Follow-up not needed.          Vitals Value Taken Time   BP 98/65 11/17/23 0759   Temp 36 11/17/23 0759   Pulse 88 11/17/23 0759   Resp 18 11/17/23 0759   SpO2 97 11/17/23 0759         No case tracking events are documented in the log.      Pain/Alfie Score: No data recorded

## 2023-11-17 NOTE — ANESTHESIA PREPROCEDURE EVALUATION
11/17/2023  Dayana Mejia is a 63 y.o., female.      Pre-op Assessment    I have reviewed the Patient Summary Reports.     I have reviewed the Nursing Notes. I have reviewed the NPO Status.   I have reviewed the Medications.     Review of Systems  Anesthesia Hx:  No problems with previous Anesthesia             Denies Family Hx of Anesthesia complications.    Denies Personal Hx of Anesthesia complications.                    Social:  Smoker       Cardiovascular:     Hypertension                                        Pulmonary:    Asthma                    Renal/:  Renal/ Normal                 Hepatic/GI:  Hepatic/GI Normal                 Musculoskeletal:  Arthritis               Neurological:    Neuromuscular Disease,  Headaches           Peripheral Neuropathy                          Endocrine:  Diabetes           Psych:  Psychiatric Normal                    Physical Exam  General: Well nourished, Cooperative, Alert and Oriented    Airway:  Mallampati: II   Mouth Opening: Normal  TM Distance: Normal  Tongue: Normal  Neck ROM: Normal ROM    Dental:  Intact    Chest/Lungs:  Normal Respiratory Rate    Heart:  Rate: Normal    Musculoskeletal:  Normal mobility      Anesthesia Plan  Type of Anesthesia, risks & benefits discussed:    Anesthesia Type: MAC  Intra-op Monitoring Plan: Standard ASA Monitors  Post Op Pain Control Plan: multimodal analgesia  Induction:  IV  Informed Consent: Informed consent signed with the Patient and all parties understand the risks and agree with anesthesia plan.  All questions answered.   ASA Score: 3  Day of Surgery Review of History & Physical: H&P Update referred to the surgeon/provider.  Anesthesia Plan Notes: Anesthesia plan was discussed with patient and/or representative. Risks and alternatives were discussed including the possibility of alteration of plan.     Ready For  Surgery From Anesthesia Perspective.     .

## 2023-11-17 NOTE — OP NOTE
Procedure date:  11/17/2023    Indications: 63 y.o. female In need of interval screening colonoscopy due to a previous history of colon polyps    Preoperative diagnosis:  Interval screening colonoscopy due to personal history of colon polyps    Postoperative diagnosis:  Normal colonoscopy    Procedure performed:  Screening colonoscopy    Procedure in detail:  Patient was brought to the endoscopy suite laid in a left lateral decubitus position right side up.  Intravenous anesthesia was provided.  Digital rectal exam performed exhibiting good anorectal tone and no masses.  The endoscope was then passed through the anus intubating the rectum and with gentle insufflation reaching the cecum.  Upon reaching the cecum pictures are taken the scope was then slowly withdrawn.  Overall the cecum ascending transverse descending and rectosigmoid colon all appeared to be grossly normal without mass polyp or ulcerations seen.  Scope was then retroflexed in the distal rectum revealing normal-appearing perianal mucosa no significant hemorrhoids.  It was then returned to neutral position and the colon was evacuated of air.  The patient was then relieved of anesthesia stable condition and transferred to postanesthesia care unit.    Specimens:  None    Findings:  Normal colonoscopy    Complications:  None    Recommendations:  Repeat colonoscopy at a _5_ year interval due to personal history of colon polyps    Disposition:  Upon recovery from anesthesia patient will be discharged to home with a follow up in primary care physician's office    Lisa Loving MD

## 2023-11-17 NOTE — DISCHARGE INSTRUCTIONS
Do not drive, operate heavy machinery, or sign important documents for the next 24 hours.    Repeat colonoscopy in 5 years or sooner if change in bowel habits, bleeding.

## 2023-11-22 ENCOUNTER — HOSPITAL ENCOUNTER (OUTPATIENT)
Dept: RADIOLOGY | Facility: HOSPITAL | Age: 63
Discharge: HOME | End: 2023-11-22
Attending: SPECIALIST
Payer: COMMERCIAL

## 2023-11-22 DIAGNOSIS — R52 PAIN: ICD-10-CM

## 2023-11-22 PROCEDURE — 73010 X-RAY EXAM OF SHOULDER BLADE: CPT | Mod: TC,LT

## 2023-12-13 DIAGNOSIS — M25.512 PAIN IN LEFT SHOULDER: Primary | ICD-10-CM

## 2023-12-13 NOTE — PROGRESS NOTES
[facility] [place] [facility]   Physical Therapy Initial Evaluation     Date: 12/14/2023   Name: Dayana Mejia  [facility] Number: [id number]    Therapy Diagnosis:   Encounter Diagnoses   Name Primary?    Left shoulder pain, unspecified chronicity Yes    Impaired functional mobility and activity tolerance      Physician: Tavo Villalta MD    Physician Orders: PT Eval and Treat  Medical Diagnosis from Referral: Pain in left shoulder  Evaluation Date: 12/14/2023  Authorization Period Expiration: ***  Plan of Care Expiration: 3/14/2024  Visit # / Visits authorized: 0/ ***    Time In: ***  Time Out: ***  Total Billable Time: *** minutes    Surgery: Left shoulder arthroscopic bursectomy, rotator cuff debridement, labral debridement, biceps release 7/6/23  Orthopedic Precautions: ***  Pertinent History: History of C5-6, C6-7 ACDF 8/2/22     SUBJECTIVE     History of current condition - Dayana reports: ***    Imaging***    Reason for visit: ***  Onset time and any changes***  Pain level and location: ***/10  Radiate proximal or distal: ***  Describe pain: {Desc; back pain:14185}  Numbness/tingling: ***  Agg factors: {exacerbated:53272}{Causes; Pain:67223}  Ease factors: {Pain lower relieved by:93006}  Sleeping position: ***  Worse when: {Pain worse when:04074}  Change in activity, time frame: ***  Recent illness or life change: ***  Hx of adjacent joint pain: ***  Red flags: {RED FLAGS:14437}  Functional Limitations: ***  Goals: ***  Prior therapy/intervention: ***       Medical History:   Past Medical History:   Diagnosis Date    Anxiety     Asthma     Cervical spondylosis with myelopathy     Cervical spondylosis with radiculopathy     Chronic shoulder bursitis, left 7/6/2023    Decreased shoulder mobility, left     Degenerative superior labral anterior-to-posterior (SLAP) tear of shoulder 7/6/2023    DM (diabetes mellitus)     Dyslipidemia     Granuloma annulare     History of DVT (deep vein thrombosis) 2015     History of renal cell carcinoma     HTN (hypertension)     IBS (irritable bowel syndrome)     Incomplete tear of left rotator cuff 7/6/2023    Left arm pain     Left arm weakness     Left shoulder pain     Migraine     Neuropathy, arm, left     OA (osteoarthritis)     Type 2 diabetes mellitus without complications        Surgical History:   Dayana Mejia  has a past surgical history that includes Left C5-6, C6-7 foraminotomies (06/20/2019); Laparoscopic nephrectomy (Left, 05/05/2017); Breast mass excision (Left, 2012); Cholecystectomy (1987); Hernia repair (1987); Colonoscopy; Anterior cervical discectomy w/ fusion (N/A, 08/02/2022); Arthroscopic repair of rotator cuff of shoulder (Left, 07/06/2023); Arthroscopic debridement of rotator cuff (Left, 07/06/2023); Excision of bursa (Left, 07/06/2023); and Colonoscopy (N/A, 11/17/2023).    Medications:   Dayana has a current medication list which includes the following prescription(s): n-acetyl-alpha-d-glucosamine, albuterol, allopurinol, amitiza, ascorbic acid (vitamin c), azelastine, biotin, calcium carb/magnesium oxid/d3, cetirizine, cholecalciferol (vitamin d3), famotidine/ca carb/mag hydrox, gabapentin, metformin, niacin, niacinamide, NON FORMULARY MEDICATION, omeprazole, raloxifene, rosuvastatin, spironolactone, symbicort, tamsulosin, temazepam, tramadol, triamterene-hydrochlorothiazide 37.5-25 mg, valsartan, and vascepa, and the following Facility-Administered Medications: sodium chloride 0.9%, fentanyl, lactated ringers, and sodium chloride 0.9%.    Allergies:   Review of patient's allergies indicates:   Allergen Reactions    Codeine Nausea And Vomiting     Other reaction(s): Vomiting (disorder)            CMS Impairment/Limitation/Restriction for FOTO Survey  Therapist reviewed FOTO scores for Dayana Mejia on 12/14/2023. FOTO documents entered into EPIC - see Media section.  FOTO filled out by: ***  Patient's Physical FS Primary Measure: ***  Risk Adjusted Statistical  FOTO: ***  Limitation Score: ***%  Category: Carrying  DASH: ***% disability    OBJECTIVE        Posture    {MISC; OT POSTURE:8469214434}     Palpation    Right - tender to *** palpatory pressure *** {Shoulder joint tender:72303}    Left - tender to *** palpatory pressure ***       Dermatomes    Right - ***    Left - ***     Reflexes    Clonus: ***  Tai: ***    Right - biceps, brachioradialis, and triceps  Left - biceps, brachioradialis, and triceps     Cervical Clearance    Cervical ROM  {CERVICAL ROM:37408}    Cervical Tests  {CERVICALSPECIALTESTS:02619}     AROM    Flexion ***   Extension ***  Scaption ***  Abduction ***  Adduction ***  Elbow flx ***  Elbow ext ***  Pronation ***  Supination ***  Wrist flx ***  Wrist ext ***      Pain with ***     PROM      Flexion ***   Extension ***  Scaption ***  Abduction ***  Adduction ***  Elbow flx ***  Elbow ext ***  Pronation ***  Supination ***  Wrist flx ***  Wrist ext ***    Pain with ***     MMT    Right  Shoulder: flx, ext, abd, add, IR, ER  Elbow: flx, ext    Left  Shoulder: flx, ext, abd, add, IR, ER  Elbow: flx, ext         Special Tests    Int. Rot. & Add. Impingement (Candler) ***  Drop Arm Test ***  Apprehension ***  Bhakti/Relocation ***  Bicep/yergason's Test ***  Adson's Test ***  Schaefer's Test ***  Gallo Deonte Test ***  Neer Impingement ***  Empty can test ***  Speed's Test ***  Infraspinatus Test ***  Shrug Sign ***  Lateral Radha Test ***  ERLS ***  Subacrominal Grind ***  Lift off sign ***  Belly-off ***  Bear hug ***  Horn blower's #2 ***  Passive distraction ***  Active compression ***  Modified dynamic labral shear ***     Other         TREATMENT     Dayana received the treatments listed below:       Time Activities   Manual     TherAct     TherEx     Gait     Neuro Re-ed     Modalities     E-Stim     Dry Needling     Canalith Repositioning         Home Exercises and Patient Education Provided    Education provided:   -Plan of care, HEP,  "***    Written Home Exercises Provided: {Blank single:00197::"yes","Patient instructed to cont prior HEP"}.  Exercises were reviewed and Dayana was able to demonstrate them prior to the end of the session.  Dayana demonstrated {Desc; good/fair/poor:76016} understanding of the education provided.     Copy of exercises provided placed in paper chart.    ASSESSMENT     Dayana is a 63 y.o. female referred to outpatient Physical Therapy with a medical diagnosis of ***. Patient presents with ***. Patient will benefit from skilled outpatient Physical Therapy to address the deficits stated above and in the chart below, provide education, and to maximize patient's level of independence.    Patient prognosis is {REHAB PROGNOSIS OHS:72180}.     Plan of care discussed with patient: Yes  Patient's spiritual, cultural and educational needs considered and patient is agreeable to the plan of care and goals as stated below:    Anticipated Barriers for therapy: ***    Goals:  Short Term Goals: 6 weeks   Patient will report at least 10% increase in functional capacity from initial QuickDash score to indicate clinically significant functional improvement  Patient will report at least 10 point increase on initial FOTO score to indicate clinically significant functional improvement  Patient will ***      Long Term Goals: 12 weeks   Patient will report at least 20% increase in functional capacity from initial QuickDash score to indicate clinically significant functional improvement  Patient will report at least 20 point increase on initial FOTO score to indicate clinically significant functional improvement  Patient will ***      PLAN   Plan of care Certification: 12/14/2023 to 3/14/2024.    Outpatient Physical Therapy 2-3 times weekly for 12 weeks to include the following interventions: Manual Therapy, Moist Heat/ Ice, Neuromuscular Re-ed, Patient Education, Self Care, Therapeutic Activities, and Therapeutic Exercise.     Angelica Thomas, " PT

## 2023-12-14 ENCOUNTER — CLINICAL SUPPORT (OUTPATIENT)
Dept: REHABILITATION | Facility: HOSPITAL | Age: 63
End: 2023-12-14
Payer: COMMERCIAL

## 2023-12-14 DIAGNOSIS — Z74.09 IMPAIRED FUNCTIONAL MOBILITY AND ACTIVITY TOLERANCE: ICD-10-CM

## 2023-12-14 DIAGNOSIS — M25.512 LEFT SHOULDER PAIN, UNSPECIFIED CHRONICITY: Primary | ICD-10-CM

## 2023-12-14 PROCEDURE — 97140 MANUAL THERAPY 1/> REGIONS: CPT

## 2023-12-14 PROCEDURE — 97163 PT EVAL HIGH COMPLEX 45 MIN: CPT

## 2023-12-14 NOTE — PLAN OF CARE
[facility]   Physical Therapy Initial Evaluation     Date: 12/14/2023   Name: Dayana Mejia  [facility] Number: [id number]    Therapy Diagnosis:   Encounter Diagnoses   Name Primary?    Left shoulder pain, unspecified chronicity Yes    Impaired functional mobility and activity tolerance      Physician: Tavo Villalta MD    Physician Orders: PT Eval and Treat  Medical Diagnosis from Referral: Pain in left shoulder  Evaluation Date: 12/14/2023  Authorization Period Expiration: None  Plan of Care Expiration: 3/14/2024  Visit # / Visits authorized: 0/ 24    Time In: 1347  Time Out: 1435  Total Billable Time: 48 minutes    Surgery: Left shoulder arthroscopic bursectomy, rotator cuff debridement, labral debridement, biceps release 7/6/23  Orthopedic Precautions: None  Pertinent History: History of C5-6, C6-7 ACDF 8/2/22     SUBJECTIVE     History of current condition - Dayana reports: She was discharged from PT following left shoulder surgery above, continued independent HEP. She was doing very well, gradually progressing weight with all activities until one specific incident when she went to lift 8# for an exercise and felt a twinge/pain in her left shoulder. Pain lingered for 2 weeks with gradually worsening intensity. She sought medical care at surgeon who did her surgery and they requested an MRI. MRI denied, X-ray done instead but nothing acute showed. She was then sent to PT. Pain is severe in left anterior shoulder and radiates down to middle of arm. No superior shoulder pain.       Medical History:   Past Medical History:   Diagnosis Date    Anxiety     Asthma     Cervical spondylosis with myelopathy     Cervical spondylosis with radiculopathy     Chronic shoulder bursitis, left 7/6/2023    Decreased shoulder mobility, left     Degenerative superior labral anterior-to-posterior (SLAP) tear of shoulder 7/6/2023    DM (diabetes mellitus)     Dyslipidemia     Granuloma annulare     History of  DVT (deep vein thrombosis) 2015    History of renal cell carcinoma     HTN (hypertension)     IBS (irritable bowel syndrome)     Incomplete tear of left rotator cuff 7/6/2023    Left arm pain     Left arm weakness     Left shoulder pain     Migraine     Neuropathy, arm, left     OA (osteoarthritis)     Type 2 diabetes mellitus without complications        Surgical History:   Dayana Mejia  has a past surgical history that includes Left C5-6, C6-7 foraminotomies (06/20/2019); Laparoscopic nephrectomy (Left, 05/05/2017); Breast mass excision (Left, 2012); Cholecystectomy (1987); Hernia repair (1987); Colonoscopy; Anterior cervical discectomy w/ fusion (N/A, 08/02/2022); Arthroscopic repair of rotator cuff of shoulder (Left, 07/06/2023); Arthroscopic debridement of rotator cuff (Left, 07/06/2023); Excision of bursa (Left, 07/06/2023); and Colonoscopy (N/A, 11/17/2023).    Medications:   Dayana has a current medication list which includes the following prescription(s): n-acetyl-alpha-d-glucosamine, albuterol, allopurinol, amitiza, ascorbic acid (vitamin c), azelastine, biotin, calcium carb/magnesium oxid/d3, cetirizine, cholecalciferol (vitamin d3), famotidine/ca carb/mag hydrox, gabapentin, metformin, niacin, niacinamide, NON FORMULARY MEDICATION, omeprazole, raloxifene, rosuvastatin, spironolactone, symbicort, tamsulosin, temazepam, tramadol, triamterene-hydrochlorothiazide 37.5-25 mg, valsartan, and vascepa, and the following Facility-Administered Medications: sodium chloride 0.9%, fentanyl, lactated ringers, and sodium chloride 0.9%.    Allergies:   Review of patient's allergies indicates:   Allergen Reactions    Codeine Nausea And Vomiting     Other reaction(s): Vomiting (disorder)            CMS Impairment/Limitation/Restriction for FOTO Survey  Therapist reviewed FOTO scores for Dayana Mejia on 12/14/2023. FOTO documents entered into EPIC - see Media section.  FOTO filled out by: Patient  Patient's Physical FS Primary  Measure: 44  Risk Adjusted Statistical FOTO: 45  Limitation Score: 56%  Category: Carrying  DASH: 57.5% disability    OBJECTIVE        Posture    Rounded shoulders bilaterally (mild/moderate)     Palpation      Left - significantly tender to mild palpatory pressure anterior, lateral, and posterior shoulder (coracoid process, pec major and minor, greater and lesser tuberosity, bicep groove, rhomboids, teres major)       AROM    LUE all WFL except shoulder flexion and abduction (both ~85 deg)   PROM      LUE all WFL except shoulder flexion and abduction (both ~100 deg)     MMT    Left shoulder and elbow gross 3+/5 all throughout, with pain on abduction, flexion, scaption, IR, elbow flx,       Special Tests      Left  Int. Rot. & Add. Impingement (Price) positive  Drop Arm Test negative  Apprehension positive  Bicep/yergason's Test positive  Galol Deonte Test negative  Neer Impingement positive  Empty can test positive  Speed's Test positive  Infraspinatus Test negative  Active compression positive     Other         TREATMENT     Dayana received the treatments listed below:      Informed consent obtained after thorough explanation of risks and benefits. Signed consent form will be scanned into medical record. Pre-procedure time out performed which included verification of name, , and site of treatment. 70% isopropyl alcohol wipe down performed to treatment site with single use sterile prep pads.     Time Activities   Manual 25 min Intra-muscular pulsed stimulation using WILD ES-130 unit and TDN (see below):    TDN done to left using Seirin 0.30 needle. 50mm needle to pec major and subscap. Stim at 3Hz low frequency and 3-4 intensity for 10 min. Done in supine position.        KT tape to left anterior shoulder (targeting bicep and pec major) and subscap       TherAct     TherEx     Gait     Neuro Re-ed     Modalities     E-Stim     Dry Needling     Canalith Repositioning         Home Exercises and Patient Education  Provided    Education provided:   -Plan of care, HEP    ASSESSMENT     Dayana is a 63 y.o. female referred to outpatient Physical Therapy with a medical diagnosis of left shoulder pain. Patient presents with impaired functional mobility. Patient will benefit from skilled outpatient Physical Therapy to address the deficits stated above and in the chart below, provide education, and to maximize patient's level of independence.    Patient prognosis is Guarded.     Plan of care discussed with patient: Yes  Patient's spiritual, cultural and educational needs considered and patient is agreeable to the plan of care and goals as stated below:    Anticipated Barriers for therapy: None    Goals:  Short Term Goals: 6 weeks   Patient will report at least 10% increase in functional capacity from initial QuickDash score to indicate clinically significant functional improvement  Patient will report at least 10 point increase on initial FOTO score to indicate clinically significant functional improvement      Long Term Goals: 12 weeks   Patient will report at least 20% increase in functional capacity from initial QuickDash score to indicate clinically significant functional improvement  Patient will report at least 20 point increase on initial FOTO score to indicate clinically significant functional improvement      PLAN   Plan of care Certification: 12/14/2023 to 3/14/2024.    Outpatient Physical Therapy 2-3 times weekly for 12 weeks to include the following interventions: Manual Therapy, Moist Heat/ Ice, Neuromuscular Re-ed, Patient Education, Self Care, Therapeutic Activities, and Therapeutic Exercise.      Angelica Thomas, PT

## 2023-12-18 ENCOUNTER — LAB VISIT (OUTPATIENT)
Dept: LAB | Facility: HOSPITAL | Age: 63
End: 2023-12-18
Attending: INTERNAL MEDICINE
Payer: COMMERCIAL

## 2023-12-18 DIAGNOSIS — E87.8 DILATED CARDIOMYOPATHY SECONDARY TO ELECTROLYTE DEFICIENCY: Primary | ICD-10-CM

## 2023-12-18 DIAGNOSIS — N18.1 CHRONIC KIDNEY DISEASE, STAGE I: ICD-10-CM

## 2023-12-18 DIAGNOSIS — I43 DILATED CARDIOMYOPATHY SECONDARY TO ELECTROLYTE DEFICIENCY: Primary | ICD-10-CM

## 2023-12-18 LAB
ALBUMIN SERPL-MCNC: 3.9 G/DL (ref 3.4–4.8)
BUN SERPL-MCNC: 18.5 MG/DL (ref 9.8–20.1)
CALCIUM SERPL-MCNC: 9.9 MG/DL (ref 8.4–10.2)
CHLORIDE SERPL-SCNC: 104 MMOL/L (ref 98–107)
CO2 SERPL-SCNC: 30 MMOL/L (ref 23–31)
CREAT SERPL-MCNC: 0.89 MG/DL (ref 0.55–1.02)
CREAT UR-MCNC: 58 MG/DL (ref 45–106)
GFR SERPLBLD CREATININE-BSD FMLA CKD-EPI: >60 MLS/MIN/1.73/M2
GLUCOSE SERPL-MCNC: 106 MG/DL (ref 82–115)
MAGNESIUM SERPL-MCNC: 1.8 MG/DL (ref 1.6–2.6)
PHOSPHATE SERPL-MCNC: 3.4 MG/DL (ref 2.3–4.7)
POTASSIUM SERPL-SCNC: 4.6 MMOL/L (ref 3.5–5.1)
PROT UR STRIP-MCNC: 24.1 MG/DL
SODIUM SERPL-SCNC: 140 MMOL/L (ref 136–145)
URINE PROTEIN/CREATININE RATIO (OHS): 0.4

## 2023-12-18 PROCEDURE — 80069 RENAL FUNCTION PANEL: CPT

## 2023-12-18 PROCEDURE — 83735 ASSAY OF MAGNESIUM: CPT

## 2023-12-18 PROCEDURE — 36415 COLL VENOUS BLD VENIPUNCTURE: CPT

## 2023-12-18 PROCEDURE — 82570 ASSAY OF URINE CREATININE: CPT

## 2023-12-18 NOTE — PROGRESS NOTES
Physical Therapy Treatment Note     Name: Dayana Mejia  Clinic Number: [id number]    Therapy Diagnosis:   Encounter Diagnoses   Name Primary?    Left shoulder pain, unspecified chronicity Yes    Impaired functional mobility and activity tolerance      Physician: Tavo Villalta MD    Visit Date: 12/19/2023    Physician Orders: PT Eval and Treat  Medical Diagnosis from Referral: Pain in left shoulder  Evaluation Date: 12/14/2023  Authorization Period Expiration: None  Plan of Care Expiration: 3/14/2024  Visit # / Visits authorized: 1/ 24     Time In: 1010  Time Out: 1114  Total Billable Time: 64 minutes     Surgery: Left shoulder arthroscopic bursectomy, rotator cuff debridement, labral debridement, biceps release 7/6/23  Orthopedic Precautions: None  Pertinent History: History of C5-6, C6-7 ACDF 8/2/22    Subjective     Patient reports: Increased pain to left shoulder following dry needling, but has since reduced back to baseline pain level.    CMS Impairment/Limitation/Restriction for FOTO Survey  Therapist reviewed FOTO scores for Dayana Mejia on 12/14/2023. FOTO documents entered into EPIC - see Media section.  FOTO filled out by: Patient  Patient's Physical FS Primary Measure: 44  Risk Adjusted Statistical FOTO: 45  Limitation Score: 56%  Category: Carrying  DASH: 57.5% disability    Objective     Dayana received the following treatment:     Time Activities   Manual 27 min Left - muscle release and STM (subscap, pec minor, pec major), scapular mobilization (rotation, tilting, retraction), posterior capsule stretch, PT-resisted contract relax MWM (adduction), passive and active-assisted full shoulder overhead elevation, Biofreeze to left scapula           TherAct     TherEx 10 min NuStep   Gait     Neuro Re-ed 27 min Russian stim to left scapula (infraspinatus, supraspinatus, rhomboids, teres major) with 5:5 cycle and 2 sec ramp up, performed with LUE:  -side lying ER  -side lying scap retraction/depression  combo  -supine low row  -supine scissor  -supine inferior glide (abd @ 90 deg)  -supine isometric ext @ 130             Home Exercises Provided and Patient Education Provided     Education provided:   -Plan of care, HEP    Assessment     Significant tenderness and trigger points to subscap, pec major and minor, which I feel to be contributing to her altered scapulohumeral mechanics and subsequent mechanical pain. Acuity leads our initial emphasis on heavy manual therapy for soft tissue correction, followed by strengthening and muscle balancing through specific exercise.    Patient prognosis is Guarded.      Anticipated barriers to physical therapy: None    Goals: Dayana is working towards her goals.  Short Term Goals: 6 weeks   Patient will report at least 10% increase in functional capacity from initial QuickDash score to indicate clinically significant functional improvement  Patient will report at least 10 point increase on initial FOTO score to indicate clinically significant functional improvement        Long Term Goals: 12 weeks   Patient will report at least 20% increase in functional capacity from initial QuickDash score to indicate clinically significant functional improvement  Patient will report at least 20 point increase on initial FOTO score to indicate clinically significant functional improvement    Plan     2-3x/week x 12 weeks    Angelica Thomas, PT

## 2023-12-19 ENCOUNTER — CLINICAL SUPPORT (OUTPATIENT)
Dept: REHABILITATION | Facility: HOSPITAL | Age: 63
End: 2023-12-19
Payer: COMMERCIAL

## 2023-12-19 DIAGNOSIS — M25.512 LEFT SHOULDER PAIN, UNSPECIFIED CHRONICITY: Primary | ICD-10-CM

## 2023-12-19 DIAGNOSIS — Z74.09 IMPAIRED FUNCTIONAL MOBILITY AND ACTIVITY TOLERANCE: ICD-10-CM

## 2023-12-19 PROCEDURE — 97112 NEUROMUSCULAR REEDUCATION: CPT

## 2023-12-19 PROCEDURE — 97110 THERAPEUTIC EXERCISES: CPT

## 2023-12-19 PROCEDURE — 97140 MANUAL THERAPY 1/> REGIONS: CPT

## 2023-12-21 NOTE — PROGRESS NOTES
Physical Therapy Treatment Note     Name: Dayana Mejia  Clinic Number: [id number]    Therapy Diagnosis:   Encounter Diagnoses   Name Primary?    Left shoulder pain, unspecified chronicity Yes    Impaired functional mobility and activity tolerance      Physician: Tavo Villalta MD    Visit Date: 12/22/2023    Physician Orders: PT Eval and Treat  Medical Diagnosis from Referral: Pain in left shoulder  Evaluation Date: 12/14/2023  Authorization Period Expiration: None  Plan of Care Expiration: 3/14/2024  Visit # / Visits authorized: 2/ 24     Time In: 0919  Time Out: 1015  Total Billable Time: 56 minutes     Surgery: Left shoulder arthroscopic bursectomy, rotator cuff debridement, labral debridement, biceps release 7/6/23  Orthopedic Precautions: None  Pertinent History: History of C5-6, C6-7 ACDF 8/2/22    Subjective     Patient reports: 4-5/10 pain left shoulder, states previous session was very beneficial and helped to reduce pain.    CMS Impairment/Limitation/Restriction for FOTO Survey  Therapist reviewed FOTO scores for Dayana Mejia on 12/14/2023. FOTO documents entered into EPIC - see Media section.  FOTO filled out by: Patient  Patient's Physical FS Primary Measure: 44  Risk Adjusted Statistical FOTO: 45  Limitation Score: 56%  Category: Carrying  DASH: 57.5% disability    Objective     Dayana received the following treatment:     Time Activities   Manual 12 min Left - muscle release and STM (subscap, infraspinatus, pec minor, pec major), posterior capsule stretch, pec stretch      Biofreeze to left scapula           TherAct     TherEx 17 min NuStep, cold pack to left shoulder and scapula   Gait     Neuro Re-ed 27 min Russian stim to left scapula (infraspinatus, supraspinatus, rhomboids, teres major) with 5:5 cycle and 2 sec ramp up, performed with LUE:  -side lying ER  -side lying scap retraction/depression combo  -supine low row  -supine scissor  -supine inferior glide (abd @ 90 deg)  -supine serratus punch  (@90 deg, @120 deg)  -supine chest fly             Home Exercises Provided and Patient Education Provided     Education provided:   -Plan of care, HEP    Assessment     Overall much improved pain following previous session.    Reduced tenderness and trigger points to subscap although still present, and less soreness to pec major/minor and infraspinatus. I do feel these muscles to be contributing to her altered scapulohumeral mechanics and subsequent mechanical pain. Acuity leads our initial emphasis on heavy manual therapy for soft tissue correction, followed by strengthening and muscle balancing through specific exercise.    Patient prognosis is Guarded.      Anticipated barriers to physical therapy: None    Goals: Dayana is working towards her goals.  Short Term Goals: 6 weeks   Patient will report at least 10% increase in functional capacity from initial QuickDash score to indicate clinically significant functional improvement  Patient will report at least 10 point increase on initial FOTO score to indicate clinically significant functional improvement        Long Term Goals: 12 weeks   Patient will report at least 20% increase in functional capacity from initial QuickDash score to indicate clinically significant functional improvement  Patient will report at least 20 point increase on initial FOTO score to indicate clinically significant functional improvement    Plan     2-3x/week x 12 weeks    Angelica Thomas, PT

## 2023-12-22 ENCOUNTER — CLINICAL SUPPORT (OUTPATIENT)
Dept: REHABILITATION | Facility: HOSPITAL | Age: 63
End: 2023-12-22
Payer: COMMERCIAL

## 2023-12-22 DIAGNOSIS — M25.512 LEFT SHOULDER PAIN, UNSPECIFIED CHRONICITY: Primary | ICD-10-CM

## 2023-12-22 DIAGNOSIS — Z74.09 IMPAIRED FUNCTIONAL MOBILITY AND ACTIVITY TOLERANCE: ICD-10-CM

## 2023-12-22 PROCEDURE — 97110 THERAPEUTIC EXERCISES: CPT

## 2023-12-22 PROCEDURE — 97140 MANUAL THERAPY 1/> REGIONS: CPT

## 2023-12-22 PROCEDURE — 97112 NEUROMUSCULAR REEDUCATION: CPT

## 2023-12-26 ENCOUNTER — CLINICAL SUPPORT (OUTPATIENT)
Dept: REHABILITATION | Facility: HOSPITAL | Age: 63
End: 2023-12-26
Payer: COMMERCIAL

## 2023-12-26 DIAGNOSIS — Z74.09 IMPAIRED FUNCTIONAL MOBILITY AND ACTIVITY TOLERANCE: ICD-10-CM

## 2023-12-26 DIAGNOSIS — M25.512 LEFT SHOULDER PAIN, UNSPECIFIED CHRONICITY: Primary | ICD-10-CM

## 2023-12-26 PROCEDURE — 97140 MANUAL THERAPY 1/> REGIONS: CPT

## 2023-12-26 PROCEDURE — 97110 THERAPEUTIC EXERCISES: CPT

## 2023-12-26 PROCEDURE — 97112 NEUROMUSCULAR REEDUCATION: CPT

## 2023-12-26 NOTE — PROGRESS NOTES
Physical Therapy Treatment Note     Name: Dayana Mejia  Clinic Number: [id number]    Therapy Diagnosis:   Encounter Diagnoses   Name Primary?    Left shoulder pain, unspecified chronicity Yes    Impaired functional mobility and activity tolerance      Physician: Tavo Villalta MD    Visit Date: 12/26/2023    Physician Orders: PT Eval and Treat  Medical Diagnosis from Referral: Pain in left shoulder  Evaluation Date: 12/14/2023  Authorization Period Expiration: None  Plan of Care Expiration: 3/14/2024  Visit # / Visits authorized: 3/ 24     Time In: 1007  Time Out: 1104  Total Billable Time: 57 minutes     Surgery: Left shoulder arthroscopic bursectomy, rotator cuff debridement, labral debridement, biceps release 7/6/23  Orthopedic Precautions: None  Pertinent History: History of C5-6, C6-7 ACDF 8/2/22    Subjective     Patient reports: 5/10 pain left shoulder this morning, very sore after [place] time with all the arm use. Was not too sore after previous PT session.    CMS Impairment/Limitation/Restriction for FOTO Survey  Therapist reviewed FOTO scores for Dayana Mejia on 12/14/2023. FOTO documents entered into [employer] - see Media section.  FOTO filled out by: Patient  Patient's Physical FS Primary Measure: 44  Risk Adjusted Statistical FOTO: 45  Limitation Score: 56%  Category: Carrying  DASH: 57.5% disability    Objective     Dayana received the following treatment:     Time Activities   Manual 17 min Left - muscle release and STM (subscap, pec minor, pec major), posterior capsule stretch, pec stretch      Biofreeze to left scapula and anterior shoulder           TherAct     TherEx 10 min NuStep   Gait     Neuro Re-ed 30 min Russian stim (no exercise) and VMS (performed with below) to left scapula (infraspinatus, rhomboids, teres major, anterior deltoid) with varying cycles (5:5 vs continuous), performed with LUE supine:  -low row  -scissor  -inferior glide (abd @ 90 deg)  -serratus punch (@90 deg, @120  deg)  -band bilat horz abd  -band bilat ER             Home Exercises Provided and Patient Education Provided     Education provided:   -Plan of care, HEP    Assessment     Overall much improved pain following previous session.    Reduced tenderness and trigger points to subscap although still present, and less soreness to pec major/minor and infraspinatus. I do feel these muscles to be contributing to her altered scapulohumeral mechanics and subsequent mechanical pain. Acuity leads our initial emphasis on heavy manual therapy for soft tissue correction, followed by strengthening and muscle balancing through specific exercise.    Patient prognosis is Guarded.      Anticipated barriers to physical therapy: None    Goals: Dayana is working towards her goals.  Short Term Goals: 6 weeks   Patient will report at least 10% increase in functional capacity from initial QuickDash score to indicate clinically significant functional improvement  Patient will report at least 10 point increase on initial FOTO score to indicate clinically significant functional improvement        Long Term Goals: 12 weeks   Patient will report at least 20% increase in functional capacity from initial QuickDash score to indicate clinically significant functional improvement  Patient will report at least 20 point increase on initial FOTO score to indicate clinically significant functional improvement    Plan     2-3x/week x 12 weeks    Angelica Thomas, PT

## 2023-12-27 NOTE — PROGRESS NOTES
Physical Therapy Treatment Note     Name: Dayana Mejia  Clinic Number: [id number]    Therapy Diagnosis:   Encounter Diagnoses   Name Primary?    Left shoulder pain, unspecified chronicity Yes    Impaired functional mobility and activity tolerance      Physician: Tavo Villalta MD    Visit Date: 12/29/2023    Physician Orders: PT Eval and Treat  Medical Diagnosis from Referral: Pain in left shoulder  Evaluation Date: 12/14/2023  Authorization Period Expiration: None  Plan of Care Expiration: 3/14/2024  Visit # / Visits authorized: 4/ 24     Time In: 1047  Time Out: 1137  Total Billable Time: 50 minutes     Surgery: Left shoulder arthroscopic bursectomy, rotator cuff debridement, labral debridement, biceps release 7/6/23  Orthopedic Precautions: None  Pertinent History: History of C5-6, C6-7 ACDF 8/2/22    Subjective     Patient reports: 5/10 pain left shoulder this morning, increased pain/soreness following previous session.    CMS Impairment/Limitation/Restriction for FOTO Survey  Therapist reviewed FOTO scores for Dayana Mejia on 12/14/2023. FOTO documents entered into EPIC - see Media section.  FOTO filled out by: Patient  Patient's Physical FS Primary Measure: 44  Risk Adjusted Statistical FOTO: 45  Limitation Score: 56%  Category: Carrying  DASH: 57.5% disability    Objective     Dayana received the following treatment:     Time Activities   Manual 10 min Left - STM and DTM (subscap, infraspinatus), Biofreeze to left scapula and shoulder           TherAct     TherEx     Gait     Neuro Re-ed/Stim 35 min Russian stim to left shoulder/scapula (infraspinatus, teres major, anterior deltoid, middle deltoid) with 5:5 cycle and 2 sec ramp up      IFC to left shoulder/scapula (infraspinatus, teres major, anterior deltoid, middle deltoid)           Home Exercises Provided and Patient Education Provided     Education provided:   -Plan of care, HEP    Assessment     Reduced pain following treatment. Overall much improved  pain compared to that during evaluation.    Reduced tenderness and trigger points to subscap although still present, and less soreness to pec major/minor and infraspinatus. I do feel these muscles to be contributing to her altered scapulohumeral mechanics and subsequent mechanical pain. Acuity leads our initial emphasis on heavy manual therapy for soft tissue correction, followed by strengthening and muscle balancing through specific exercise.    Patient prognosis is Guarded.      Anticipated barriers to physical therapy: None    Goals: Dayana is working towards her goals.  Short Term Goals: 6 weeks   Patient will report at least 10% increase in functional capacity from initial QuickDash score to indicate clinically significant functional improvement  Patient will report at least 10 point increase on initial FOTO score to indicate clinically significant functional improvement        Long Term Goals: 12 weeks   Patient will report at least 20% increase in functional capacity from initial QuickDash score to indicate clinically significant functional improvement  Patient will report at least 20 point increase on initial FOTO score to indicate clinically significant functional improvement    Plan     2-3x/week x 12 weeks    Angelica Thomas, PT

## 2023-12-29 ENCOUNTER — CLINICAL SUPPORT (OUTPATIENT)
Dept: REHABILITATION | Facility: HOSPITAL | Age: 63
End: 2023-12-29
Payer: COMMERCIAL

## 2023-12-29 DIAGNOSIS — M25.512 LEFT SHOULDER PAIN, UNSPECIFIED CHRONICITY: Primary | ICD-10-CM

## 2023-12-29 DIAGNOSIS — Z74.09 IMPAIRED FUNCTIONAL MOBILITY AND ACTIVITY TOLERANCE: ICD-10-CM

## 2023-12-29 PROCEDURE — 97112 NEUROMUSCULAR REEDUCATION: CPT

## 2023-12-29 PROCEDURE — 97140 MANUAL THERAPY 1/> REGIONS: CPT

## 2024-01-02 ENCOUNTER — CLINICAL SUPPORT (OUTPATIENT)
Dept: REHABILITATION | Facility: HOSPITAL | Age: 64
End: 2024-01-02
Payer: COMMERCIAL

## 2024-01-02 DIAGNOSIS — M25.512 LEFT SHOULDER PAIN, UNSPECIFIED CHRONICITY: Primary | ICD-10-CM

## 2024-01-02 DIAGNOSIS — Z74.09 IMPAIRED FUNCTIONAL MOBILITY AND ACTIVITY TOLERANCE: ICD-10-CM

## 2024-01-02 PROCEDURE — 97110 THERAPEUTIC EXERCISES: CPT

## 2024-01-02 PROCEDURE — 97112 NEUROMUSCULAR REEDUCATION: CPT

## 2024-01-02 NOTE — PROGRESS NOTES
Physical Therapy Treatment Note     Name: Dayana Mejia  Clinic Number: [id number]    Therapy Diagnosis:   Encounter Diagnoses   Name Primary?    Left shoulder pain, unspecified chronicity Yes    Impaired functional mobility and activity tolerance      Physician: Tavo Villalta MD    Visit Date: 1/2/2024    Physician Orders: PT Eval and Treat  Medical Diagnosis from Referral: Pain in left shoulder  Evaluation Date: 12/14/2023  Authorization Period Expiration: None  Plan of Care Expiration: 3/14/2024  Visit # / Visits authorized: 5/ 24     Time In: 1003  Time Out: 1106  Total Billable Time: 63 minutes     Surgery: Left shoulder arthroscopic bursectomy, rotator cuff debridement, labral debridement, biceps release 7/6/23  Orthopedic Precautions: None  Pertinent History: History of C5-6, C6-7 ACDF 8/2/22    Subjective     Patient reports: 4/10 pain left shoulder this morning, yesterday was a 25/10.    CMS Impairment/Limitation/Restriction for FOTO Survey  Therapist reviewed FOTO scores for Dayana Mejia on 12/14/2023. FOTO documents entered into [employer] - see Media section.  FOTO filled out by: Patient  Patient's Physical FS Primary Measure: 44  Risk Adjusted Statistical FOTO: 45  Limitation Score: 56%  Category: Carrying  DASH: 57.5% disability    Objective     Dayana received the following treatment:     Time Activities   Manual 10 min Left - STM and DTM (subscap, infraspinatus), Biofreeze to left scapula and shoulder           TherAct     TherEx 16 min All done in supine with Russian stim  -scissors, serratus punch plus (90 deg, 120 deg), low row, inferior glide, band bilateral (ER, horz abd), band IR    Biofreeze to left scapula      NuStep       Gait     Neuro Re-ed/Stim 47 min Russian stim to left shoulder/scapula (infraspinatus, teres major, anterior deltoid, middle deltoid) with 5:5 cycle and 2 sec ramp up  -performed with above therex    IFC to left shoulder/scapula (infraspinatus, teres major, anterior deltoid,  middle deltoid)           Home Exercises Provided and Patient Education Provided     Education provided:   -Plan of care, HEP    Assessment     Reduced pain following treatment. Overall much improved pain compared to that during evaluation.    Reduced tenderness and trigger points to subscap although still present, and less soreness to pec major/minor and infraspinatus. I do feel these muscles to be contributing to her altered scapulohumeral mechanics and subsequent mechanical pain. Acuity leads our initial emphasis on heavy manual therapy for soft tissue correction, followed by strengthening and muscle balancing through specific exercise.    Patient prognosis is Guarded.      Anticipated barriers to physical therapy: None    Goals: Dayana is working towards her goals.  Short Term Goals: 6 weeks   Patient will report at least 10% increase in functional capacity from initial QuickDash score to indicate clinically significant functional improvement  Patient will report at least 10 point increase on initial FOTO score to indicate clinically significant functional improvement        Long Term Goals: 12 weeks   Patient will report at least 20% increase in functional capacity from initial QuickDash score to indicate clinically significant functional improvement  Patient will report at least 20 point increase on initial FOTO score to indicate clinically significant functional improvement    Plan     2-3x/week x 12 weeks    Angelica Thomas, PT

## 2024-01-05 ENCOUNTER — CLINICAL SUPPORT (OUTPATIENT)
Dept: REHABILITATION | Facility: HOSPITAL | Age: 64
End: 2024-01-05
Payer: COMMERCIAL

## 2024-01-05 DIAGNOSIS — Z74.09 IMPAIRED FUNCTIONAL MOBILITY AND ACTIVITY TOLERANCE: ICD-10-CM

## 2024-01-05 DIAGNOSIS — M25.512 LEFT SHOULDER PAIN, UNSPECIFIED CHRONICITY: Primary | ICD-10-CM

## 2024-01-05 PROCEDURE — 97140 MANUAL THERAPY 1/> REGIONS: CPT

## 2024-01-05 PROCEDURE — 97530 THERAPEUTIC ACTIVITIES: CPT

## 2024-01-05 NOTE — PROGRESS NOTES
Physical Therapy Treatment Note     Name: Dayana Mejia  Clinic Number: [id number]    Therapy Diagnosis:   Encounter Diagnoses   Name Primary?    Left shoulder pain, unspecified chronicity Yes    Impaired functional mobility and activity tolerance      Physician: Tavo Villalta MD    Visit Date: 1/5/2024    Physician Orders: PT Eval and Treat  Medical Diagnosis from Referral: Pain in left shoulder  Evaluation Date: 12/14/2023  Authorization Period Expiration: None  Plan of Care Expiration: 3/14/2024  Visit # / Visits authorized: 6/ 24     Time In: 0920  Time Out: 1013  Total Billable Time: 53 minutes     Surgery: Left shoulder arthroscopic bursectomy, rotator cuff debridement, labral debridement, biceps release 7/6/23  Orthopedic Precautions: None  Pertinent History: History of C5-6, C6-7 ACDF 8/2/22    Subjective     Patient reports: Severe pain that started yesterday morning, thinks she may have slept on her left side the night before.    CMS Impairment/Limitation/Restriction for FOTO Survey  Therapist reviewed FOTO scores for Dayana Mejia on 12/14/2023. FOTO documents entered into [employer] - see Media section.  FOTO filled out by: Patient  Patient's Physical FS Primary Measure: 44  Risk Adjusted Statistical FOTO: 45  Limitation Score: 56%  Category: Carrying  DASH: 57.5% disability      Therapist reviewed FOTO scores for Dayana Mejia on 1/5/2023. FOTO documents entered into [employer] - see Media section.  FOTO filled out by: Patient  Patient's Physical FS Primary Measure: 56  Risk Adjusted Statistical FOTO: 45  Limitation Score: 44%  Category: Carrying  DASH: 30% disability    Objective     Dayana received the following treatment:     Time Activities   Manual 40 min Left - STM posterior left shoulder/scapula, PT-assisted prone scap retraction, shoulder distraction mobilizations, physiologic scapular retraction, posterior capsule stretch, anterior capsule stretch           TherAct     TherEx 13 min Cold pack to left  shoulder       Gait     Neuro Re-ed/Stim  Russian stim to left shoulder/scapula (infraspinatus, teres major, anterior deltoid, middle deltoid) with 5:5 cycle and 2 sec ramp up  -performed with above therex    IFC to left shoulder/scapula (infraspinatus, teres major, anterior deltoid, middle deltoid)           Home Exercises Provided and Patient Education Provided     Education provided:   -Plan of care, HEP    Assessment     She is significantly irritable in left shoulder despite minimal activity performance. Suspect she may have an intra-articular tear in left shoulder that warrants further medical assessment. We initially saw mild improvement, followed by return of severe pain.    Patient prognosis is Guarded.      Anticipated barriers to physical therapy: None    Goals: Dayana is working towards her goals.  Short Term Goals: 6 weeks   Patient will report at least 10% increase in functional capacity from initial QuickDash score to indicate clinically significant functional improvement (goal met)  Patient will report at least 10 point increase on initial FOTO score to indicate clinically significant functional improvement (goal met)        Long Term Goals: 12 weeks   Patient will report at least 20% increase in functional capacity from initial QuickDash score to indicate clinically significant functional improvement (goal met)  Patient will report at least 20 point increase on initial FOTO score to indicate clinically significant functional improvement (progressing)    Plan     2-3x/week x 12 weeks    Angelica Thomas, PT

## 2024-01-09 ENCOUNTER — CLINICAL SUPPORT (OUTPATIENT)
Dept: REHABILITATION | Facility: HOSPITAL | Age: 64
End: 2024-01-09
Payer: COMMERCIAL

## 2024-01-09 DIAGNOSIS — Z74.09 IMPAIRED FUNCTIONAL MOBILITY AND ACTIVITY TOLERANCE: ICD-10-CM

## 2024-01-09 DIAGNOSIS — M25.512 LEFT SHOULDER PAIN, UNSPECIFIED CHRONICITY: Primary | ICD-10-CM

## 2024-01-09 PROCEDURE — 97110 THERAPEUTIC EXERCISES: CPT

## 2024-01-09 NOTE — PROGRESS NOTES
Physical Therapy Treatment Note     Name: Dayana Mejia  Clinic Number: [id number]    Therapy Diagnosis:   Encounter Diagnoses   Name Primary?    Left shoulder pain, unspecified chronicity Yes    Impaired functional mobility and activity tolerance      Physician: Tavo Villalta MD    Visit Date: 1/9/2024    Physician Orders: PT Eval and Treat  Medical Diagnosis from Referral: Pain in left shoulder  Evaluation Date: 12/14/2023  Authorization Period Expiration: None  Plan of Care Expiration: 3/14/2024  Visit # / Visits authorized: 7/ 24     Time In: 1019  Time Out: 1052  Total Billable Time: 33 minutes     Surgery: Left shoulder arthroscopic bursectomy, rotator cuff debridement, labral debridement, biceps release 7/6/23  Orthopedic Precautions: None  Pertinent History: History of C5-6, C6-7 ACDF 8/2/22    Subjective     Patient reports: Moderate pain left shoulder. Day of previous session she took a pain pill (Tramadol), which she doesn't like to take at all. Medication helped a bit. Since then she has gradually increased left arm use and it is not nearly as severe in pain as it was.    CMS Impairment/Limitation/Restriction for FOTO Survey  Therapist reviewed FOTO scores for Dayana Mejia on 12/14/2023. FOTO documents entered into [employer] - see Media section.  FOTO filled out by: Patient  Patient's Physical FS Primary Measure: 44  Risk Adjusted Statistical FOTO: 45  Limitation Score: 56%  Category: Carrying  DASH: 57.5% disability      Therapist reviewed FOTO scores for Dayana Mejia on 1/5/2023. FOTO documents entered into [employer] - see Media section.  FOTO filled out by: Patient  Patient's Physical FS Primary Measure: 56  Risk Adjusted Statistical FOTO: 45  Limitation Score: 44%  Category: Carrying  DASH: 30% disability    Objective     Dayana received the following treatment:     Time Activities   Manual  Left - STM posterior left shoulder/scapula, PT-assisted prone scap retraction, shoulder distraction mobilizations,  physiologic scapular retraction, posterior capsule stretch, anterior capsule stretch           TherAct     TherEx 33 min NuStep, pulleys, wall wipes flexion, standing scaption, standing abduction, band shoulder walk out (IR, ER)    Biofreeze to left shoulder/scapula       Gait     Neuro Re-ed/Stim  Russian stim to left shoulder/scapula (infraspinatus, teres major, anterior deltoid, middle deltoid) with 5:5 cycle and 2 sec ramp up  -performed with above therex    IFC to left shoulder/scapula (infraspinatus, teres major, anterior deltoid, middle deltoid)           Home Exercises Provided and Patient Education Provided     Education provided:   -Plan of care, HEP    Assessment     Today was a great day for her, moderate pain and no exacerbation during any of the exercises performed. We will tweak and progress based on her tolerance, but at this point the goal is little to no pain with activities done in clinic in order to minimize post session soreness and tolerance.    Patient prognosis is Guarded.      Anticipated barriers to physical therapy: None    Goals: Dayana is working towards her goals.  Short Term Goals: 6 weeks   Patient will report at least 10% increase in functional capacity from initial QuickDash score to indicate clinically significant functional improvement (goal met)  Patient will report at least 10 point increase on initial FOTO score to indicate clinically significant functional improvement (goal met)        Long Term Goals: 12 weeks   Patient will report at least 20% increase in functional capacity from initial QuickDash score to indicate clinically significant functional improvement (goal met)  Patient will report at least 20 point increase on initial FOTO score to indicate clinically significant functional improvement (progressing)    Plan     2-3x/week x 12 weeks    Angelica Thomas, PT

## 2024-01-11 NOTE — PROGRESS NOTES
Physical Therapy Treatment Note     Name: Dayana Mejia  Clinic Number: [id number]    Therapy Diagnosis:   Encounter Diagnoses   Name Primary?    Left shoulder pain, unspecified chronicity Yes    Impaired functional mobility and activity tolerance      Physician: Tavo Villalta MD    Visit Date: 1/12/2024    Physician Orders: PT Eval and Treat  Medical Diagnosis from Referral: Pain in left shoulder  Evaluation Date: 12/14/2023  Authorization Period Expiration: None  Plan of Care Expiration: 3/14/2024  Visit # / Visits authorized: 8/ 24     Time In: 0914  Time Out: 1013  Total Billable Time: 59 minutes     Surgery: Left shoulder arthroscopic bursectomy, rotator cuff debridement, labral debridement, biceps release 7/6/23  Orthopedic Precautions: None  Pertinent History: History of C5-6, C6-7 ACDF 8/2/22    Subjective     Patient reports: Low 4/10 left shoulder pain. Has been minimal since previous session. Most of her pain is anterior shoulder.    CMS Impairment/Limitation/Restriction for FOTO Survey  Therapist reviewed FOTO scores for Dayana Mejia on 12/14/2023. FOTO documents entered into [employer] - see Media section.  FOTO filled out by: Patient  Patient's Physical FS Primary Measure: 44  Risk Adjusted Statistical FOTO: 45  Limitation Score: 56%  Category: Carrying  DASH: 57.5% disability      Therapist reviewed FOTO scores for Dayana Mejia on 1/5/2023. FOTO documents entered into [employer] - see Media section.  FOTO filled out by: Patient  Patient's Physical FS Primary Measure: 56  Risk Adjusted Statistical FOTO: 45  Limitation Score: 44%  Category: Carrying  DASH: 30% disability    Objective     Dayana received the following treatment:     Time Activities   Manual  Left - STM posterior left shoulder/scapula, PT-assisted prone scap retraction, shoulder distraction mobilizations, physiologic scapular retraction, posterior capsule stretch, anterior capsule stretch           TherAct     TherEx 59 min NuStep, wall wipes flexion,  wall wipes circles      Russian stim to left shoulder/scapula (infraspinatus, teres major, anterior deltoid, middle deltoid) with 5:5 cycle and 2 sec ramp up         Gait     Neuro Re-ed/Stim  Russian stim to left shoulder/scapula (infraspinatus, teres major, anterior deltoid, middle deltoid) with 5:5 cycle and 2 sec ramp up  -performed with above therex    IFC to left shoulder/scapula (infraspinatus, teres major, anterior deltoid, middle deltoid)           Home Exercises Provided and Patient Education Provided     Education provided:   -Plan of care, HEP    Assessment     Today was a great day for her, minimal pain and no exacerbation during any of the exercises performed. We have found a regimen that works for her. We will tweak and progress based on her tolerance, but at this point the goal is little to no pain with activities done in clinic in order to minimize post session soreness and tolerance.    Patient prognosis is Guarded.      Anticipated barriers to physical therapy: None    Goals: Dayana is working towards her goals.  Short Term Goals: 6 weeks   Patient will report at least 10% increase in functional capacity from initial QuickDash score to indicate clinically significant functional improvement (goal met)  Patient will report at least 10 point increase on initial FOTO score to indicate clinically significant functional improvement (goal met)        Long Term Goals: 12 weeks   Patient will report at least 20% increase in functional capacity from initial QuickDash score to indicate clinically significant functional improvement (goal met)  Patient will report at least 20 point increase on initial FOTO score to indicate clinically significant functional improvement (progressing)    Plan     2-3x/week x 12 weeks    Angelica Thomas, PT

## 2024-01-12 ENCOUNTER — CLINICAL SUPPORT (OUTPATIENT)
Dept: REHABILITATION | Facility: HOSPITAL | Age: 64
End: 2024-01-12
Payer: COMMERCIAL

## 2024-01-12 DIAGNOSIS — M25.512 LEFT SHOULDER PAIN, UNSPECIFIED CHRONICITY: Primary | ICD-10-CM

## 2024-01-12 DIAGNOSIS — Z74.09 IMPAIRED FUNCTIONAL MOBILITY AND ACTIVITY TOLERANCE: ICD-10-CM

## 2024-01-12 PROCEDURE — 97110 THERAPEUTIC EXERCISES: CPT

## 2024-01-22 NOTE — PROGRESS NOTES
Physical Therapy Treatment Note     Name: Dayana Mejia  Clinic Number: [id number]    Therapy Diagnosis:   Encounter Diagnoses   Name Primary?    Left shoulder pain, unspecified chronicity Yes    Impaired functional mobility and activity tolerance      Physician: Tavo Villalta MD    Visit Date: 1/23/2024    Physician Orders: PT Eval and Treat  Medical Diagnosis from Referral: Pain in left shoulder  Evaluation Date: 12/14/2023  Authorization Period Expiration: None  Plan of Care Expiration: 3/14/2024  Visit # / Visits authorized: 9/ 24     Time In: 1203  Time Out: 1256  Total Billable Time: 53 minutes     Surgery: Left shoulder arthroscopic bursectomy, rotator cuff debridement, labral debridement, biceps release 7/6/23  Orthopedic Precautions: None  Pertinent History: History of C5-6, C6-7 ACDF 8/2/22    Subjective     Patient reports: Low 4/10 left shoulder pain, has not gone up even with missing 1 week of PT.    CMS Impairment/Limitation/Restriction for FOTO Survey  Therapist reviewed FOTO scores for Dayana Mejia on 12/14/2023. FOTO documents entered into [employer] - see Media section.  FOTO filled out by: Patient  Patient's Physical FS Primary Measure: 44  Risk Adjusted Statistical FOTO: 45  Limitation Score: 56%  Category: Carrying  DASH: 57.5% disability      Therapist reviewed FOTO scores for Dayana Mejia on 1/5/2023. FOTO documents entered into [employer] - see Media section.  FOTO filled out by: Patient  Patient's Physical FS Primary Measure: 56  Risk Adjusted Statistical FOTO: 45  Limitation Score: 44%  Category: Carrying  DASH: 30% disability    Objective     Dayana received the following treatment:     Time Activities   Manual  Left - STM posterior left shoulder/scapula, PT-assisted prone scap retraction, shoulder distraction mobilizations, physiologic scapular retraction, posterior capsule stretch, anterior capsule stretch           TherAct     TherEx 53 min NuStep, pulley flexion (seated), standing AROM (scaption,  abduction), band isometric walkout (ER, IR), band low row, band inferior glide, wall wipes (scaption)      Russian stim to left shoulder/scapula (infraspinatus, teres major, anterior deltoid, supraspinatus) with 5:5 cycle and 2 sec ramp up      Biofreeze to left shoulder and scapula     Gait     Neuro Re-ed/Stim  Russian stim to left shoulder/scapula (infraspinatus, teres major, anterior deltoid, middle deltoid) with 5:5 cycle and 2 sec ramp up  -performed with above therex    IFC to left shoulder/scapula (infraspinatus, teres major, anterior deltoid, middle deltoid)           Home Exercises Provided and Patient Education Provided     Education provided:   -Plan of care, HEP    Assessment     She remains with minimal pain during this route of treatment. We are still reaching terminal overhead motion, although sagittal plane more painful than scapular plane. We will continue along this route with gradual increase of scapula stabilizers to reduce/minimize impingement.    Patient prognosis is Guarded.      Anticipated barriers to physical therapy: None    Goals: Dayana is working towards her goals.  Short Term Goals: 6 weeks   Patient will report at least 10% increase in functional capacity from initial QuickDash score to indicate clinically significant functional improvement (goal met)  Patient will report at least 10 point increase on initial FOTO score to indicate clinically significant functional improvement (goal met)        Long Term Goals: 12 weeks   Patient will report at least 20% increase in functional capacity from initial QuickDash score to indicate clinically significant functional improvement (goal met)  Patient will report at least 20 point increase on initial FOTO score to indicate clinically significant functional improvement (progressing)    Plan     2-3x/week x 12 weeks    Angelica Thomas, PT

## 2024-01-23 ENCOUNTER — CLINICAL SUPPORT (OUTPATIENT)
Dept: REHABILITATION | Facility: HOSPITAL | Age: 64
End: 2024-01-23
Payer: COMMERCIAL

## 2024-01-23 DIAGNOSIS — M25.512 LEFT SHOULDER PAIN, UNSPECIFIED CHRONICITY: Primary | ICD-10-CM

## 2024-01-23 DIAGNOSIS — Z74.09 IMPAIRED FUNCTIONAL MOBILITY AND ACTIVITY TOLERANCE: ICD-10-CM

## 2024-01-23 PROCEDURE — 97110 THERAPEUTIC EXERCISES: CPT

## 2024-01-24 NOTE — PROGRESS NOTES
Physical Therapy Treatment Note     Name: Dayana Mejia  Clinic Number: [id number]    Therapy Diagnosis:   Encounter Diagnoses   Name Primary?    Left shoulder pain, unspecified chronicity Yes    Impaired functional mobility and activity tolerance      Physician: Tavo Villalta MD    Visit Date: 1/26/2024    Physician Orders: PT Eval and Treat  Medical Diagnosis from Referral: Pain in left shoulder  Evaluation Date: 12/14/2023  Authorization Period Expiration: None  Plan of Care Expiration: 3/14/2024  Visit # / Visits authorized: 10/ 24     Time In: 0920  Time Out: 1030  Total Billable Time: 70 minutes     Surgery: Left shoulder arthroscopic bursectomy, rotator cuff debridement, labral debridement, biceps release 7/6/23  Orthopedic Precautions: None  Pertinent History: History of C5-6, C6-7 ACDF 8/2/22    Subjective     Patient reports: 3/10 left shoulder pain, she states it went up after work yesterday because her desk was set up to where her shoulders were elevated (poor ergonomics). She is going to ask her spouse to fix her workstation.    CMS Impairment/Limitation/Restriction for FOTO Survey  Therapist reviewed FOTO scores for Dayana Mejia on 12/14/2023. FOTO documents entered into [employer] - see Media section.  FOTO filled out by: Patient  Patient's Physical FS Primary Measure: 44  Risk Adjusted Statistical FOTO: 45  Limitation Score: 56%  Category: Carrying  DASH: 57.5% disability      Therapist reviewed FOTO scores for Dayana Mejia on 1/5/2023. FOTO documents entered into [employer] - see Media section.  FOTO filled out by: Patient  Patient's Physical FS Primary Measure: 56  Risk Adjusted Statistical FOTO: 45  Limitation Score: 44%  Category: Carrying  DASH: 30% disability    Objective     Dayana received the following treatment:     Time Activities   Manual  Left - STM posterior left shoulder/scapula, PT-assisted prone scap retraction, shoulder distraction mobilizations, physiologic scapular retraction, posterior capsule  stretch, anterior capsule stretch           TherAct     TherEx 70 min NuStep, band low row (shoulder extension from 30 deg to neutral), band inferior glide (adduction from 90 deg to 45 deg), serratus wall flexion (with foam), behind the back IR (PT stabilized scapula to prevent anterior tilt), supine lying on 1/2 foam roll for anterior shoulder/chest stretch (normal, then with scissors)      IFC stim to left shoulder/scapula (infraspinatus, teres major, anterior deltoid, supraspinatus) with intensity to tolerance - paired with MHP to shoulder           Gait     Neuro Re-ed/Stim  Russian stim to left shoulder/scapula (infraspinatus, teres major, anterior deltoid, middle deltoid) with 5:5 cycle and 2 sec ramp up  -performed with above therex    IFC to left shoulder/scapula (infraspinatus, teres major, anterior deltoid, middle deltoid)           Home Exercises Provided and Patient Education Provided     Education provided:   -Plan of care, HEP    Assessment     Less than 3/10 pain post session. We are increasing functional ROM, although painful into behind the back IR. Testing shoulder, pain reproduced with resisted IR. We are still reaching terminal overhead motion, although sagittal plane more painful than scapular plane. We will continue along this route with gradual increase of scapula stabilizers to reduce/minimize impingement.    Patient prognosis is Guarded.      Anticipated barriers to physical therapy: None    Goals: Dayana is working towards her goals.  Short Term Goals: 6 weeks   Patient will report at least 10% increase in functional capacity from initial QuickDash score to indicate clinically significant functional improvement (goal met)  Patient will report at least 10 point increase on initial FOTO score to indicate clinically significant functional improvement (goal met)        Long Term Goals: 12 weeks   Patient will report at least 20% increase in functional capacity from initial QuickDash score to  indicate clinically significant functional improvement (goal met)  Patient will report at least 20 point increase on initial FOTO score to indicate clinically significant functional improvement (progressing)    Plan     2-3x/week x 12 weeks    Angelica Thomas, PT

## 2024-01-26 ENCOUNTER — CLINICAL SUPPORT (OUTPATIENT)
Dept: REHABILITATION | Facility: HOSPITAL | Age: 64
End: 2024-01-26
Payer: COMMERCIAL

## 2024-01-26 DIAGNOSIS — Z74.09 IMPAIRED FUNCTIONAL MOBILITY AND ACTIVITY TOLERANCE: ICD-10-CM

## 2024-01-26 DIAGNOSIS — M25.512 LEFT SHOULDER PAIN, UNSPECIFIED CHRONICITY: Primary | ICD-10-CM

## 2024-01-26 PROCEDURE — 97110 THERAPEUTIC EXERCISES: CPT

## 2024-02-06 ENCOUNTER — CLINICAL SUPPORT (OUTPATIENT)
Dept: REHABILITATION | Facility: HOSPITAL | Age: 64
End: 2024-02-06
Payer: COMMERCIAL

## 2024-02-06 DIAGNOSIS — Z74.09 IMPAIRED FUNCTIONAL MOBILITY AND ACTIVITY TOLERANCE: ICD-10-CM

## 2024-02-06 DIAGNOSIS — M25.512 LEFT SHOULDER PAIN, UNSPECIFIED CHRONICITY: Primary | ICD-10-CM

## 2024-02-06 PROCEDURE — 97110 THERAPEUTIC EXERCISES: CPT

## 2024-02-06 NOTE — PROGRESS NOTES
Physical Therapy Treatment Note     Name: Dayana Mejia  Clinic Number: [id number]    Therapy Diagnosis:   Encounter Diagnoses   Name Primary?    Left shoulder pain, unspecified chronicity Yes    Impaired functional mobility and activity tolerance      Physician: Tavo Villalta MD    Visit Date: 2/6/2024    Physician Orders: PT Eval and Treat  Medical Diagnosis from Referral: Pain in left shoulder  Evaluation Date: 12/14/2023  Authorization Period Expiration: None  Plan of Care Expiration: 3/14/2024  Visit # / Visits authorized: 12/ 24     Time In: 0920  Time Out: 1013  Total Billable Time: 53 minutes     Surgery: Left shoulder arthroscopic bursectomy, rotator cuff debridement, labral debridement, biceps release 7/6/23  Orthopedic Precautions: None  Pertinent History: History of C5-6, C6-7 ACDF 8/2/22    Subjective     Patient reports: Left shoulder has gotten better, but she feels this to be because she hardly used her arm for the week and a half she was very sick with the flu. Lying on left side continues to be very problematic.    CMS Impairment/Limitation/Restriction for FOTO Survey  Therapist reviewed FOTO scores for Dayana Mejia on 12/14/2023. FOTO documents entered into EPIC - see Media section.  FOTO filled out by: Patient  Patient's Physical FS Primary Measure: 44  Risk Adjusted Statistical FOTO: 45  Limitation Score: 56%  Category: Carrying  DASH: 57.5% disability      Therapist reviewed FOTO scores for Dayana Mejia on 1/5/2023. FOTO documents entered into EPIC - see Media section.  FOTO filled out by: Patient  Patient's Physical FS Primary Measure: 56  Risk Adjusted Statistical FOTO: 45  Limitation Score: 44%  Category: Carrying  DASH: 30% disability      Therapist reviewed FOTO scores for Dayana Mejia on 2/6/2024. FOTO documents entered into EPIC - see Media section.  FOTO filled out by: Patient  Patient's Physical FS Primary Measure: 65  Risk Adjusted Statistical FOTO: 45  Limitation Score: 35%  Category:  Carrying  DASH: 15.8% disability    Objective     Dayana received the following treatment:     Time Activities   Manual  Left - STM posterior left shoulder/scapula, PT-assisted prone scap retraction, shoulder distraction mobilizations, physiologic scapular retraction, posterior capsule stretch, anterior capsule stretch           TherAct     TherEx 53 min NuStep, band low row (shoulder extension from 30 deg to neutral), band inferior glide (adduction from 90 deg to 45 deg), serratus plus punch with band, band (ER, IR), wall wipes in scapular plane      IFC stim to left shoulder/scapula (infraspinatus, teres major, anterior deltoid, supraspinatus)  -paired with CP to shoulder  -intensity to tolerance           Gait     Neuro Re-ed/Stim  Russian stim to left shoulder/scapula (infraspinatus, teres major, anterior deltoid, middle deltoid) with 5:5 cycle and 2 sec ramp up  -performed with above therex    IFC to left shoulder/scapula (infraspinatus, teres major, anterior deltoid, middle deltoid)           Home Exercises Provided and Patient Education Provided     Education provided:   -Plan of care, HEP    Assessment     Disuse of left arm likely contributed to low pain state, as there were no opportunities for mechanical pain onset. We resumed scapulohumeral stabilization exercises, and she tolerated well although easily fatigued. We will gradually progress, hopefully minimizing resultant soreness. If her pain remains this low for a long period of time, we will discuss her return to the gym for trial at independent program. We will stress the importance of proper form during exercises and normal ADLs.    Patient prognosis is Guarded.      Anticipated barriers to physical therapy: None    Goals: Dayana is working towards her goals.  Short Term Goals: 6 weeks   Patient will report at least 10% increase in functional capacity from initial QuickDash score to indicate clinically significant functional improvement (goal  met)  Patient will report at least 10 point increase on initial FOTO score to indicate clinically significant functional improvement (goal met)        Long Term Goals: 12 weeks   Patient will report at least 20% increase in functional capacity from initial QuickDash score to indicate clinically significant functional improvement (goal met)  Patient will report at least 20 point increase on initial FOTO score to indicate clinically significant functional improvement (goal met)    Plan     2-3x/week x 12 weeks    Angelica Thomas, PT

## 2024-02-08 NOTE — PROGRESS NOTES
Physical Therapy Treatment Note     Name: Dayana Mejia  Clinic Number: [id number]    Therapy Diagnosis:   Encounter Diagnoses   Name Primary?    Left shoulder pain, unspecified chronicity Yes    Impaired functional mobility and activity tolerance      Physician: Tavo Villalta MD    Visit Date: 2/9/2024    Physician Orders: PT Eval and Treat  Medical Diagnosis from Referral: Pain in left shoulder  Evaluation Date: 12/14/2023  Authorization Period Expiration: None  Plan of Care Expiration: 3/14/2024  Visit # / Visits authorized: 13/ 24     Time In: 0917  Time Out: 1001  Total Billable Time: 44 minutes     Surgery: Left shoulder arthroscopic bursectomy, rotator cuff debridement, labral debridement, biceps release 7/6/23  Orthopedic Precautions: None  Pertinent History: History of C5-6, C6-7 ACDF 8/2/22    Subjective     Patient reports: Left shoulder stays well, minimal pain 3/10. Lying on left side continues to be very problematic.    CMS Impairment/Limitation/Restriction for FOTO Survey  Therapist reviewed FOTO scores for Dayana Mejia on 12/14/2023. FOTO documents entered into EPIC - see Media section.  FOTO filled out by: Patient  Patient's Physical FS Primary Measure: 44  Risk Adjusted Statistical FOTO: 45  Limitation Score: 56%  Category: Carrying  DASH: 57.5% disability      Therapist reviewed FOTO scores for Dayana Mejia on 1/5/2023. FOTO documents entered into EPIC - see Media section.  FOTO filled out by: Patient  Patient's Physical FS Primary Measure: 56  Risk Adjusted Statistical FOTO: 45  Limitation Score: 44%  Category: Carrying  DASH: 30% disability      Therapist reviewed FOTO scores for Dayana Mejia on 2/6/2024. FOTO documents entered into EPIC - see Media section.  FOTO filled out by: Patient  Patient's Physical FS Primary Measure: 65  Risk Adjusted Statistical FOTO: 45  Limitation Score: 35%  Category: Carrying  DASH: 15.8% disability    Objective     Dayana received the following treatment:     Time  Activities   Manual  Left - STM posterior left shoulder/scapula, PT-assisted prone scap retraction, shoulder distraction mobilizations, physiologic scapular retraction, posterior capsule stretch, anterior capsule stretch           TherAct     TherEx 44 min NuStep, band low row (shoulder extension from 30 deg to neutral), band inferior glide (adduction from 90 deg to 45 deg), serratus plus punch with band, band (ER, IR), serratus wall flx with band      MHP to left shoulder  -paired with CP to shoulder  -intensity to tolerance           Gait     Neuro Re-ed/Stim  Russian stim to left shoulder/scapula (infraspinatus, teres major, anterior deltoid, middle deltoid) with 5:5 cycle and 2 sec ramp up  -performed with above therex    IFC to left shoulder/scapula (infraspinatus, teres major, anterior deltoid, middle deltoid)           Home Exercises Provided and Patient Education Provided     Education provided:   -Plan of care, HEP    Assessment     We continue with scapulohumeral stabilization exercises, and she is tolerating progression very well. If her pain remains this low for a long period of time, we will discuss her return to the gym for trial at independent program. We will stress the importance of proper form during exercises and normal ADLs.    Patient prognosis is Guarded.      Anticipated barriers to physical therapy: None    Goals: Dayana is working towards her goals.  Short Term Goals: 6 weeks   Patient will report at least 10% increase in functional capacity from initial QuickDash score to indicate clinically significant functional improvement (goal met)  Patient will report at least 10 point increase on initial FOTO score to indicate clinically significant functional improvement (goal met)        Long Term Goals: 12 weeks   Patient will report at least 20% increase in functional capacity from initial QuickDash score to indicate clinically significant functional improvement (goal met)  Patient will report at  least 20 point increase on initial FOTO score to indicate clinically significant functional improvement (goal met)    Plan     2-3x/week x 12 weeks    Angelica Thomas, PT

## 2024-02-09 ENCOUNTER — CLINICAL SUPPORT (OUTPATIENT)
Dept: REHABILITATION | Facility: HOSPITAL | Age: 64
End: 2024-02-09
Payer: COMMERCIAL

## 2024-02-09 DIAGNOSIS — Z74.09 IMPAIRED FUNCTIONAL MOBILITY AND ACTIVITY TOLERANCE: ICD-10-CM

## 2024-02-09 DIAGNOSIS — M25.512 LEFT SHOULDER PAIN, UNSPECIFIED CHRONICITY: Primary | ICD-10-CM

## 2024-02-09 PROCEDURE — 97110 THERAPEUTIC EXERCISES: CPT

## 2024-02-14 NOTE — PROGRESS NOTES
Physical Therapy Treatment Note     Name: Dayana Mejia  Clinic Number: [id number]    Therapy Diagnosis:   Encounter Diagnoses   Name Primary?    Left shoulder pain, unspecified chronicity Yes    Impaired functional mobility and activity tolerance      Physician: Tavo Villalta MD    Visit Date: 2/16/2024    Physician Orders: PT Eval and Treat  Medical Diagnosis from Referral: Pain in left shoulder  Evaluation Date: 12/14/2023  Authorization Period Expiration: None  Plan of Care Expiration: 3/14/2024  Visit # / Visits authorized: 14/ 24     Time In: 0924  Time Out: 1034  Total Billable Time: 70 minutes     Surgery: Left shoulder arthroscopic bursectomy, rotator cuff debridement, labral debridement, biceps release 7/6/23  Orthopedic Precautions: None  Pertinent History: History of C5-6, C6-7 ACDF 8/2/22    Subjective     Patient reports: Left shoulder stays well, minimal pain 3/10. Lying on left side continues to be very problematic.    CMS Impairment/Limitation/Restriction for FOTO Survey  Therapist reviewed FOTO scores for Dayana Mejia on 12/14/2023. FOTO documents entered into EPIC - see Media section.  FOTO filled out by: Patient  Patient's Physical FS Primary Measure: 44  Risk Adjusted Statistical FOTO: 45  Limitation Score: 56%  Category: Carrying  DASH: 57.5% disability      Therapist reviewed FOTO scores for Dayana Mejia on 1/5/2023. FOTO documents entered into EPIC - see Media section.  FOTO filled out by: Patient  Patient's Physical FS Primary Measure: 56  Risk Adjusted Statistical FOTO: 45  Limitation Score: 44%  Category: Carrying  DASH: 30% disability      Therapist reviewed FOTO scores for Dayana Mejia on 2/6/2024. FOTO documents entered into EPIC - see Media section.  FOTO filled out by: Patient  Patient's Physical FS Primary Measure: 65  Risk Adjusted Statistical FOTO: 45  Limitation Score: 35%  Category: Carrying  DASH: 15.8% disability    Objective     Dayana received the following treatment:     Time  Activities   Manual  Left - STM posterior left shoulder/scapula, PT-assisted prone scap retraction, shoulder distraction mobilizations, physiologic scapular retraction, posterior capsule stretch, anterior capsule stretch           TherAct     TherEx 70 min NuStep, band low row (shoulder extension from 30 deg to neutral), band bilateral ER, serratus wall flx with band, supine scap series with [place]vorian stim (band bilateral ER, serratus push plus)    IFC to left shoulder (anterior deltoid, rhomboids, infraspinatus, serratus)         Gait     Neuro Re-ed/Stim  Russian stim to left shoulder/scapula (infraspinatus, teres major, anterior deltoid, middle deltoid) with 5:5 cycle and 2 sec ramp up  -performed with above therex    IFC to left shoulder/scapula (infraspinatus, teres major, anterior deltoid, middle deltoid)           Home Exercises Provided and Patient Education Provided     Education provided:   -Plan of care, HEP    Assessment     We continue with scapulohumeral stabilization exercises, and she is tolerating progression very well. If her pain remains this low for a long period of time, we will discuss her return to the gym for trial at independent program. We will stress the importance of proper form during exercises and normal ADLs.    Patient prognosis is Guarded.      Anticipated barriers to physical therapy: None    Goals: Dayana is working towards her goals.  Short Term Goals: 6 weeks   Patient will report at least 10% increase in functional capacity from initial QuickDash score to indicate clinically significant functional improvement (goal met)  Patient will report at least 10 point increase on initial FOTO score to indicate clinically significant functional improvement (goal met)        Long Term Goals: 12 weeks   Patient will report at least 20% increase in functional capacity from initial QuickDash score to indicate clinically significant functional improvement (goal met)  Patient will report at least 20  point increase on initial FOTO score to indicate clinically significant functional improvement (goal met)    Plan     2-3x/week x 12 weeks    Angelica Thomas, PT

## 2024-02-16 ENCOUNTER — CLINICAL SUPPORT (OUTPATIENT)
Dept: REHABILITATION | Facility: HOSPITAL | Age: 64
End: 2024-02-16
Payer: COMMERCIAL

## 2024-02-16 DIAGNOSIS — M25.512 LEFT SHOULDER PAIN, UNSPECIFIED CHRONICITY: Primary | ICD-10-CM

## 2024-02-16 DIAGNOSIS — Z74.09 IMPAIRED FUNCTIONAL MOBILITY AND ACTIVITY TOLERANCE: ICD-10-CM

## 2024-02-16 PROCEDURE — 97110 THERAPEUTIC EXERCISES: CPT

## 2024-02-20 ENCOUNTER — CLINICAL SUPPORT (OUTPATIENT)
Dept: REHABILITATION | Facility: HOSPITAL | Age: 64
End: 2024-02-20
Payer: COMMERCIAL

## 2024-02-20 DIAGNOSIS — M25.512 LEFT SHOULDER PAIN, UNSPECIFIED CHRONICITY: Primary | ICD-10-CM

## 2024-02-20 PROCEDURE — 97112 NEUROMUSCULAR REEDUCATION: CPT

## 2024-02-20 PROCEDURE — 97110 THERAPEUTIC EXERCISES: CPT

## 2024-02-20 NOTE — PROGRESS NOTES
"  Physical Therapy Treatment Note     Name: Dayana Mejia  Clinic Number: [id number]    Therapy Diagnosis:   Encounter Diagnosis   Name Primary?    Left shoulder pain, unspecified chronicity Yes     Physician: Tavo Villalta MD    Visit Date: 2/20/2024    Physician Orders: PT Eval and Treat  Medical Diagnosis from Referral: Pain in left shoulder  Evaluation Date: 12/14/2023  Authorization Period Expiration: None  Plan of Care Expiration: 3/14/2024  Visit # / Visits authorized: 15/ 24     Time In: 0920  Time Out: 1030  Total Billable Time: 70 minutes     Surgery: Left shoulder arthroscopic bursectomy, rotator cuff debridement, labral debridement, biceps release 7/6/23  Orthopedic Precautions: None  Pertinent History: History of C5-6, C6-7 ACDF 8/2/22    Subjective     Patient reports: Left shoulder joint feels better with only complaint in biceps where she feels a knot. States she realizes she has been "babying" her L side and has made a conscious effort to try to use it more during daily routines.    CMS Impairment/Limitation/Restriction for FOTO Survey  Therapist reviewed FOTO scores for Dayana Mejia on 12/14/2023. FOTO documents entered into EPIC - see Media section.  FOTO filled out by: Patient  Patient's Physical FS Primary Measure: 44  Risk Adjusted Statistical FOTO: 45  Limitation Score: 56%  Category: Carrying  DASH: 57.5% disability    Therapist reviewed FOTO scores for Dayana Mejia on 1/5/2023. FOTO documents entered into EPIC - see Media section.  FOTO filled out by: Patient  Patient's Physical FS Primary Measure: 56  Risk Adjusted Statistical FOTO: 45  Limitation Score: 44%  Category: Carrying  DASH: 30% disability    Therapist reviewed FOTO scores for Dayana Mejia on 2/6/2024. FOTO documents entered into EPIC - see Media section.  FOTO filled out by: Patient  Patient's Physical FS Primary Measure: 65  Risk Adjusted Statistical FOTO: 45  Limitation Score: 35%  Category: Carrying  DASH: 15.8% " disability    Objective     Dayana received the following treatment:     Time Activities   Manual Not done today Left - STM posterior left shoulder/scapula, PT-assisted prone scap retraction, shoulder distraction mobilizations, physiologic scapular retraction, posterior capsule stretch, anterior capsule stretch   TherAct     TherEx 45 min NuStep, band low row (shoulder extension from 30 deg to neutral), band bilateral ER, serratus wall flx with band, supine scap series with [place] stim (band bilateral ER, serratus push plus)    IFC to left shoulder (anterior deltoid, rhomboids, infraspinatus, serratus)   Gait     Neuro Re-ed/Stim 25 min Russian stim to left shoulder/scapula (infraspinatus, teres major, anterior deltoid, middle deltoid) with 5:5 cycle and 2 sec ramp up  -performed with above therex    IFC to left shoulder/scapula (infraspinatus, teres major, anterior deltoid, middle deltoid)       Home Exercises Provided and Patient Education Provided     Education provided:   -Plan of care, HEP    Assessment     We continue with scapulohumeral stabilization exercises, and she is tolerating progression very well but at slower pace. Adjusted some exercises today with increased fatigue noted. If her pain remains this low for a long period of time, we will discuss her return to the gym for trial at independent program. We will stress the importance of proper form during exercises and normal ADLs, which she reports being aware of and will focus on.    Patient prognosis is Guarded.      Anticipated barriers to physical therapy: None    Goals: Dayana is working towards her goals.  Short Term Goals: 6 weeks   Patient will report at least 10% increase in functional capacity from initial QuickDash score to indicate clinically significant functional improvement (goal met)  Patient will report at least 10 point increase on initial FOTO score to indicate clinically significant functional improvement (goal met)        Long Term  Goals: 12 weeks   Patient will report at least 20% increase in functional capacity from initial QuickDash score to indicate clinically significant functional improvement (goal met)  Patient will report at least 20 point increase on initial FOTO score to indicate clinically significant functional improvement (goal met)    Plan     2-3x/week x 12 weeks    Bear Cline, PT

## 2024-02-23 ENCOUNTER — CLINICAL SUPPORT (OUTPATIENT)
Dept: REHABILITATION | Facility: HOSPITAL | Age: 64
End: 2024-02-23
Payer: COMMERCIAL

## 2024-02-23 DIAGNOSIS — M25.512 LEFT SHOULDER PAIN, UNSPECIFIED CHRONICITY: Primary | ICD-10-CM

## 2024-02-23 PROCEDURE — 97110 THERAPEUTIC EXERCISES: CPT

## 2024-02-23 PROCEDURE — 97112 NEUROMUSCULAR REEDUCATION: CPT

## 2024-02-23 NOTE — PROGRESS NOTES
"  Physical Therapy Treatment Note     Name: Dayana Mejia  Clinic Number: [id number]    Therapy Diagnosis:   Encounter Diagnosis   Name Primary?    Left shoulder pain, unspecified chronicity Yes     Physician: Tavo Villalta MD    Visit Date: 2/23/2024    Physician Orders: PT Eval and Treat  Medical Diagnosis from Referral: Pain in left shoulder  Evaluation Date: 12/14/2023  Authorization Period Expiration: None  Plan of Care Expiration: 3/14/2024  Visit # / Visits authorized: 16/ 24     Time In: 0922  Time Out: 1017  Total Billable Time: 55 minutes     Surgery: Left shoulder arthroscopic bursectomy, rotator cuff debridement, labral debridement, biceps release 7/6/23  Orthopedic Precautions: None  Pertinent History: History of C5-6, C6-7 ACDF 8/2/22    Subjective     Patient reports: "today is not a good day, sore and achy all over body." Not feeling good overall and not just in shoulder. States she is continuing to focus on increased use of L U efor routine tasks but is still limited with lifting larger/heavier objects.     CMS Impairment/Limitation/Restriction for FOTO Survey  Therapist reviewed FOTO scores for Dayana Mejia on 12/14/2023. FOTO documents entered into EPIC - see Media section.  FOTO filled out by: Patient  Patient's Physical FS Primary Measure: 44  Risk Adjusted Statistical FOTO: 45  Limitation Score: 56%  Category: Carrying  DASH: 57.5% disability    Therapist reviewed FOTO scores for Dayana Mejia on 1/5/2023. FOTO documents entered into EPIC - see Media section.  FOTO filled out by: Patient  Patient's Physical FS Primary Measure: 56  Risk Adjusted Statistical FOTO: 45  Limitation Score: 44%  Category: Carrying  DASH: 30% disability    Therapist reviewed FOTO scores for Dayana Mejia on 2/6/2024. FOTO documents entered into EPIC - see Media section.  FOTO filled out by: Patient  Patient's Physical FS Primary Measure: 65  Risk Adjusted Statistical FOTO: 45  Limitation Score: 35%  Category: Carrying  DASH: " 15.8% disability    Objective     Dayana received the following treatment:     Time Activities   Manual Not done today Left - STM posterior left shoulder/scapula, PT-assisted prone scap retraction, shoulder distraction mobilizations, physiologic scapular retraction, posterior capsule stretch, anterior capsule stretch   TherAct     TherEx 30 min NuStep, band low row (shoulder extension from 30 deg to neutral), band bilateral ER, serratus wall flx with band, supine scap series with [place]ian stim (band bilateral ER, serratus push plus)    IFC to left shoulder (anterior deltoid, rhomboids, infraspinatus, serratus)   Gait     Neuro Re-ed/Stim 25 min Russian stim to left shoulder/scapula (infraspinatus, teres major, anterior deltoid, middle deltoid) with 5:5 cycle and 2 sec ramp up  -performed with above therex    IFC to left shoulder/scapula (infraspinatus, teres major, anterior deltoid, middle deltoid)       Home Exercises Provided and Patient Education Provided     Education provided:   -Plan of care, HEP    Assessment     Had a slight increase in soreness overall today. Able to complete exercises but was more of a challenge. We continue with scapulohumeral stabilization exercises, and she is tolerating progression very well but at slower pace. Adjusted some exercises today with increased fatigue noted. If her pain remains this low for a long period of time, we will discuss her return to the gym for trial at independent program.     Patient prognosis is Guarded.      Anticipated barriers to physical therapy: None    Goals: Dayana is working towards her goals.  Short Term Goals: 6 weeks   Patient will report at least 10% increase in functional capacity from initial QuickDash score to indicate clinically significant functional improvement (goal met)  Patient will report at least 10 point increase on initial FOTO score to indicate clinically significant functional improvement (goal met)        Long Term Goals: 12 weeks    Patient will report at least 20% increase in functional capacity from initial QuickDash score to indicate clinically significant functional improvement (goal met)  Patient will report at least 20 point increase on initial FOTO score to indicate clinically significant functional improvement (goal met)    Plan     2-3x/week x 12 weeks    Bear Cline, PT

## 2024-02-27 ENCOUNTER — CLINICAL SUPPORT (OUTPATIENT)
Dept: REHABILITATION | Facility: HOSPITAL | Age: 64
End: 2024-02-27
Payer: COMMERCIAL

## 2024-02-27 DIAGNOSIS — M25.512 LEFT SHOULDER PAIN, UNSPECIFIED CHRONICITY: Primary | ICD-10-CM

## 2024-02-27 DIAGNOSIS — Z74.09 IMPAIRED FUNCTIONAL MOBILITY AND ACTIVITY TOLERANCE: ICD-10-CM

## 2024-02-27 PROCEDURE — 97110 THERAPEUTIC EXERCISES: CPT

## 2024-03-04 DIAGNOSIS — M25.512 PAIN IN LEFT SHOULDER: Primary | ICD-10-CM

## 2024-03-05 ENCOUNTER — CLINICAL SUPPORT (OUTPATIENT)
Dept: REHABILITATION | Facility: HOSPITAL | Age: 64
End: 2024-03-05
Payer: COMMERCIAL

## 2024-03-05 DIAGNOSIS — Z74.09 IMPAIRED FUNCTIONAL MOBILITY AND ACTIVITY TOLERANCE: ICD-10-CM

## 2024-03-05 DIAGNOSIS — M25.512 LEFT SHOULDER PAIN, UNSPECIFIED CHRONICITY: Primary | ICD-10-CM

## 2024-03-05 PROCEDURE — 97110 THERAPEUTIC EXERCISES: CPT

## 2024-03-05 NOTE — PROGRESS NOTES
Physical Therapy Treatment Note     Name: Dayana Mejia  Clinic Number: [id number]    Therapy Diagnosis:   Encounter Diagnoses   Name Primary?    Left shoulder pain, unspecified chronicity Yes    Impaired functional mobility and activity tolerance      Physician: Tavo Villalta MD    Visit Date: 3/5/2024    Physician Orders: PT Eval and Treat  Medical Diagnosis from Referral: Pain in left shoulder  Evaluation Date: 12/14/2023  Authorization Period Expiration: None  Plan of Care Expiration: 3/14/2024  Visit # / Visits authorized: 18/ 24     Time In: 0907  Time Out: 0940  Total Billable Time: 33 minutes     Surgery: Left shoulder arthroscopic bursectomy, rotator cuff debridement, labral debridement, biceps release 7/6/23  Orthopedic Precautions: None  Pertinent History: History of C5-6, C6-7 ACDF 8/2/22    Subjective     Patient reports: Waxing and waning left arm pain/function. Has been approved for MRI, will be going on Thursday 3/7/24.    CMS Impairment/Limitation/Restriction for FOTO Survey  Therapist reviewed FOTO scores for Dayana Mejia on 12/14/2023. FOTO documents entered into [employer] - see Media section.  FOTO filled out by: Patient  Patient's Physical FS Primary Measure: 44  Risk Adjusted Statistical FOTO: 45  Limitation Score: 56%  Category: Carrying  DASH: 57.5% disability    Therapist reviewed FOTO scores for Dayana Mejia on 1/5/2023. FOTO documents entered into EP[employer] - see Media section.  FOTO filled out by: Patient  Patient's Physical FS Primary Measure: 56  Risk Adjusted Statistical FOTO: 45  Limitation Score: 44%  Category: Carrying  DASH: 30% disability    Therapist reviewed FOTO scores for Dayana Mejia on 2/6/2024. FOTO documents entered into EPIC - see Media section.  FOTO filled out by: Patient  Patient's Physical FS Primary Measure: 65  Risk Adjusted Statistical FOTO: 45  Limitation Score: 35%  Category: Carrying  DASH: 15.8% disability      Therapist reviewed FOTO scores for Dayana Mejia on 3/5/2024.  FOTO documents entered into [email or web address] - see Media section.  FOTO filled out by: Patient  Patient's Physical FS Primary Measure: TBD  Risk Adjusted Statistical FOTO: 45  Limitation Score: TBD%  Category: Carrying  DASH: TBD% disability    Objective     Dayana received the following treatment:     Time Activities   Manual Not done today Left - STM posterior left shoulder/scapula, PT-assisted prone scap retraction, shoulder distraction mobilizations, physiologic scapular retraction, posterior capsule stretch, anterior capsule stretch   TherAct     TherEx 33 min NuStep    Supine  -scissors  -band bilateral ER  -band horz abd with overhead movement    Standing  -bicep curl into overhead press (bilateral)    Biofreeze to left shoulder and scapula     Gait     Neuro Re-ed/Stim  Russian stim to left shoulder/scapula (infraspinatus, teres major, anterior deltoid, middle deltoid) with 5:5 cycle and 2 sec ramp up  -performed with above therex    IFC to left shoulder/scapula (infraspinatus, teres major, anterior deltoid, middle deltoid)       Home Exercises Provided and Patient Education Provided     Education provided:   -Plan of care, HEP    Assessment     At a certain point in plan of care, we increased intensity and incorporated demand of overhead activities to closer mimic the activities that are the most difficult for her to perform. While she is able to perform the exercises, she has a great deal of difficulty with certain overhead activities. Pain remains and waxes/wanes, having direct impact on her functional capacity. She is set to get an MRI on 3/7/24, which will give us a better idea of the degree of pathology. Until then, we will continue with scapulohumeral stabilization exercises.      Patient prognosis is Guarded.      Anticipated barriers to physical therapy: None    Goals: Dayana is working towards her goals.  Short Term Goals: 6 weeks   Patient will report at least 10% increase in functional capacity from initial  QuickDash score to indicate clinically significant functional improvement (goal met)  Patient will report at least 10 point increase on initial FOTO score to indicate clinically significant functional improvement (goal met)        Long Term Goals: 12 weeks   Patient will report at least 20% increase in functional capacity from initial QuickDash score to indicate clinically significant functional improvement (goal met)  Patient will report at least 20 point increase on initial FOTO score to indicate clinically significant functional improvement (goal met)    Plan     2-3x/week x 12 weeks    Angelica Thomas, PT

## 2024-03-07 ENCOUNTER — HOSPITAL ENCOUNTER (OUTPATIENT)
Dept: RADIOLOGY | Facility: HOSPITAL | Age: 64
Discharge: HOME | End: 2024-03-07
Attending: SPECIALIST
Payer: COMMERCIAL

## 2024-03-07 DIAGNOSIS — M25.512 PAIN IN LEFT SHOULDER: ICD-10-CM

## 2024-03-07 PROCEDURE — 73221 MRI JOINT UPR EXTREM W/O DYE: CPT | Mod: TC,LT

## 2024-03-11 NOTE — PROGRESS NOTES
Physical Therapy Treatment Note     Name: Dayana Mejia  [facility] Number: [id number]    Therapy Diagnosis:   Encounter Diagnoses   Name Primary?    Left shoulder pain, unspecified chronicity Yes    Impaired functional mobility and activity tolerance      Physician: Tavo Villalta MD    Visit Date: 3/12/2024    Physician Orders: PT Eval and Treat  Medical Diagnosis from Referral: Pain in left shoulder  Evaluation Date: 12/14/2023  Authorization Period Expiration: None  Plan of Care Expiration: 3/14/2024  Visit # / Visits authorized: 19/ 24     Time In: 0915  Time Out: 0955  Total Billable Time: 40 minutes     Surgery: Left shoulder arthroscopic bursectomy, rotator cuff debridement, labral debridement, biceps release 7/6/23  Orthopedic Precautions: None  Pertinent History: History of C5-6, C6-7 ACDF 8/2/22    Subjective     Patient reports: MRI of left shoulder taken. She follows up with MD on Monday 3/18/24. No change in condition.    Narrative & Impression  EXAMINATION:  MRI SHOULDER WITHOUT CONTRAST LEFT     CLINICAL HISTORY:  m25.512;  Pain in left shoulder     TECHNIQUE:  Multiplanar multisequence images were performed through the left shoulder.  Contrast was not administered     COMPARISON:  MRI left shoulder used for comparison dated 04/27/2023     FINDINGS:  Calcific tendonitis is present to the distal infraspinatus tendon along with a low-grade partial-thickness tear.  The appearance is similar to what was seen on the prior study.  An intermediate grade partial-thickness bursal surface tear is present to the distal supraspinatus tendon and appears minimally worse on today's study than it did on the prior exam.  Subscapularis and teres minor tendons are intact.     A tear is present to the superior portion of the glenoid labrum.  The long head biceps tendon is not clearly visible in the bicipital groove.  Articular cartilage is intact.  No thickening of the joint capsule.     No os acromial.  Coracoclavicular  ligaments are intact.  No atrophy of the rotator cuff muscles.  The included portion of the pectoralis major is intact.     Impression:     A full-thickness tear is present to the intra-articular portion of the long head biceps tendon with retraction distal to the bicipital groove.  This was not present on the prior study.     A complex tear is present to the superior portion of the glenoid labrum and was not present on the prior study.     Calcific tendonitis is present to the distal infraspinatus tendon.  An intermediate grade partial-thickness bursal surface tear is present to the distal supraspinatus tendon and has worsened minimally since the prior study.  No retraction or associated atrophy is present.        Electronically signed by: Jona Ortiz  Date:                                            03/08/2024  Time:                                           17:08           Exam Ended: 03/07/24 09:07 CST Last Resulted: 03/08/24 17:08 CST             CMS Impairment/Limitation/Restriction for FOTO Survey  Therapist reviewed FOTO scores for Dayaan Mejia on 12/14/2023. FOTO documents entered into EPIC - see Media section.  FOTO filled out by: Patient  Patient's Physical FS Primary Measure: 44  Risk Adjusted Statistical FOTO: 45  Limitation Score: 56%  Category: Carrying  DASH: 57.5% disability    Therapist reviewed FOTO scores for Dayana Mejia on 1/5/2023. FOTO documents entered into EPIC - see Media section.  FOTO filled out by: Patient  Patient's Physical FS Primary Measure: 56  Risk Adjusted Statistical FOTO: 45  Limitation Score: 44%  Category: Carrying  DASH: 30% disability    Therapist reviewed FOTO scores for Dayana Mejia on 2/6/2024. FOTO documents entered into EPIC - see Media section.  FOTO filled out by: Patient  Patient's Physical FS Primary Measure: 65  Risk Adjusted Statistical FOTO: 45  Limitation Score: 35%  Category: Carrying  DASH: 15.8% disability      Therapist reviewed FOTO scores for Dayana Mejia on  3/5/2024. FOTO documents entered into [employer] - see Media section.  FOTO filled out by: Patient  Patient's Physical FS Primary Measure: TBD  Risk Adjusted Statistical FOTO: 45  Limitation Score: TBD%  Category: Carrying  DASH: TBD% disability    Objective     Dayana received the following treatment:     Time Activities   Manual  Left - STM posterior left shoulder/scapula, PT-assisted prone scap retraction, shoulder distraction mobilizations, physiologic scapular retraction, posterior capsule stretch, anterior capsule stretch   TherAct 40 min IFC to left shoulder/scapula (infraspinatus, teres major, anterior deltoid, middle deltoid) with MHP to left shoulder      Education       TherEx  NuStep    Supine  -scissors  -band bilateral ER  -band horz abd with overhead movement    Standing  -bicep curl into overhead press (bilateral)    Biofreeze to left shoulder and scapula     Gait     Neuro Re-ed/Stim  Russian stim to left shoulder/scapula (infraspinatus, teres major, anterior deltoid, middle deltoid) with 5:5 cycle and 2 sec ramp up  -performed with above therex    IFC to left shoulder/scapula (infraspinatus, teres major, anterior deltoid, middle deltoid)       Home Exercises Provided and Patient Education Provided     Education provided:   -Plan of care, HEP    Assessment     At a certain point in plan of care, we increased intensity and incorporated demand of overhead activities to closer mimic the activities that are the most difficult for her to perform. While she is able to perform the exercises, she has a great deal of difficulty with certain overhead activities. Pain remains and waxes/wanes, having direct impact on her functional capacity. She had an MRI on 3/7/24, which showed SLAP tear with bicep tear and worsening supraspinatus tear. Given our extensive rehab efforts and her hitting a plateau, we are referring back to medical provider for further treatment.      Patient prognosis is Guarded.      Anticipated  barriers to physical therapy: None    Goals: Dayana is working towards her goals.  Short Term Goals: 6 weeks   Patient will report at least 10% increase in functional capacity from initial QuickDash score to indicate clinically significant functional improvement (goal met)  Patient will report at least 10 point increase on initial FOTO score to indicate clinically significant functional improvement (goal met)        Long Term Goals: 12 weeks   Patient will report at least 20% increase in functional capacity from initial QuickDash score to indicate clinically significant functional improvement (goal met)  Patient will report at least 20 point increase on initial FOTO score to indicate clinically significant functional improvement (goal met)    Plan     2-3x/week x 12 weeks    Angelica Thomas, PT

## 2024-03-12 ENCOUNTER — CLINICAL SUPPORT (OUTPATIENT)
Dept: REHABILITATION | Facility: HOSPITAL | Age: 64
End: 2024-03-12
Payer: COMMERCIAL

## 2024-03-12 DIAGNOSIS — Z74.09 IMPAIRED FUNCTIONAL MOBILITY AND ACTIVITY TOLERANCE: ICD-10-CM

## 2024-03-12 DIAGNOSIS — M25.512 LEFT SHOULDER PAIN, UNSPECIFIED CHRONICITY: Primary | ICD-10-CM

## 2024-03-12 PROCEDURE — 97530 THERAPEUTIC ACTIVITIES: CPT

## 2024-03-18 NOTE — PROGRESS NOTES
Physical Therapy Treatment Note     Name: Dayana Mejia  Clinic Number: [id number]    Therapy Diagnosis:   Encounter Diagnoses   Name Primary?    Left shoulder pain, unspecified chronicity Yes    Impaired functional mobility and activity tolerance      Physician: Tavo Villalta MD    Visit Date: 3/19/2024    Physician Orders: PT Eval and Treat  Medical Diagnosis from Referral: Pain in left shoulder  Evaluation Date: 12/14/2023  Authorization Period Expiration: None  Plan of Care Expiration: 3/14/2024  Visit # / Visits authorized: 20/ 24     Time In: 0915  Time Out: 1010  Total Billable Time: 55 minutes     Surgery: Left shoulder arthroscopic bursectomy, rotator cuff debridement, labral debridement, biceps release 7/6/23  Orthopedic Precautions: None  Pertinent History: History of C5-6, C6-7 ACDF 8/2/22    Subjective     Patient reports: Had MD appointment yesterday, was told to continue PT. Had injection to left shoulder during MD visit, no relief as of yet.      MRI Left Shoulder 03/07/2024  Impression:     A full-thickness tear is present to the intra-articular portion of the long head biceps tendon with retraction distal to the bicipital groove.  This was not present on the prior study.     A complex tear is present to the superior portion of the glenoid labrum and was not present on the prior study.     Calcific tendonitis is present to the distal infraspinatus tendon.  An intermediate grade partial-thickness bursal surface tear is present to the distal supraspinatus tendon and has worsened minimally since the prior study.  No retraction or associated atrophy is present.      CMS Impairment/Limitation/Restriction for FOTO Survey  Therapist reviewed FOTO scores for Dayana Mejia on 12/14/2023. FOTO documents entered into EPIC - see Media section.  FOTO filled out by: Patient  Patient's Physical FS Primary Measure: 44  Risk Adjusted Statistical FOTO: 45  Limitation Score: 56%  Category: Carrying  DASH: 57.5%  disability    Therapist reviewed FOTO scores for Dayana Mejia on 1/5/2023. FOTO documents entered into EPIC - see Media section.  FOTO filled out by: Patient  Patient's Physical FS Primary Measure: 56  Risk Adjusted Statistical FOTO: 45  Limitation Score: 44%  Category: Carrying  DASH: 30% disability    Therapist reviewed FOTO scores for Dayana Mejia on 2/6/2024. FOTO documents entered into EPIC - see Media section.  FOTO filled out by: Patient  Patient's Physical FS Primary Measure: 65  Risk Adjusted Statistical FOTO: 45  Limitation Score: 35%  Category: Carrying  DASH: 15.8% disability      Therapist reviewed FOTO scores for Dayana Mejia on 3/5/2024. FOTO documents entered into EPIC - see Media section.  FOTO filled out by: Patient  Patient's Physical FS Primary Measure: TBD  Risk Adjusted Statistical FOTO: 45  Limitation Score: TBD%  Category: Carrying  DASH: TBD% disability    Objective     Dayana received the following treatment:     Time Activities   Manual  Left - STM posterior left shoulder/scapula, PT-assisted prone scap retraction, shoulder distraction mobilizations, physiologic scapular retraction, posterior capsule stretch, anterior capsule stretch   TherAct  IFC to left shoulder/scapula (infraspinatus, teres major, anterior deltoid, middle deltoid) with MHP to left shoulder      Education       TherEx 55 min NuStep    Left shoulder (eccentric emphasis on all)  -band ER  -band IR  -dumbbell scaption  -dumbbell flexion (BFR)  -dumbbell abd (BFR)  -low row isometric (BFR)  -inferior glide isometric (BFR)        Cold pack and Biofreeze to left shoulder and scapula     Gait     Neuro Re-ed/Stim  Russian stim to left shoulder/scapula (infraspinatus, teres major, anterior deltoid, middle deltoid) with 5:5 cycle and 2 sec ramp up  -performed with above therex    IFC to left shoulder/scapula (infraspinatus, teres major, anterior deltoid, middle deltoid)       Home Exercises Provided and Patient Education Provided      Education provided:   -Plan of care, HEP    Assessment     Continuing therapy with low load, eccentric strengthening. We may limit activities done overhead given her injuries confirmed via MRI on 3/7/24 (SLAP tear with bicep tear and worsening supraspinatus tear). While she is able to perform the exercises, she has a great deal of difficulty with certain overhead activities. Pain remains and waxes/wanes, having direct impact on her functional capacity. We will continue with careful progression as tolerated.      Patient prognosis is Guarded.      Anticipated barriers to physical therapy: None    Goals: Dayana is working towards her goals.  Short Term Goals: 6 weeks   Patient will report at least 10% increase in functional capacity from initial QuickDash score to indicate clinically significant functional improvement (goal met)  Patient will report at least 10 point increase on initial FOTO score to indicate clinically significant functional improvement (goal met)        Long Term Goals: 12 weeks   Patient will report at least 20% increase in functional capacity from initial QuickDash score to indicate clinically significant functional improvement (goal met)  Patient will report at least 20 point increase on initial FOTO score to indicate clinically significant functional improvement (goal met)    Plan     2-3x/week x 12 weeks    Angelica Thomas, PT

## 2024-03-19 ENCOUNTER — CLINICAL SUPPORT (OUTPATIENT)
Dept: REHABILITATION | Facility: HOSPITAL | Age: 64
End: 2024-03-19
Payer: COMMERCIAL

## 2024-03-19 DIAGNOSIS — M25.512 LEFT SHOULDER PAIN, UNSPECIFIED CHRONICITY: Primary | ICD-10-CM

## 2024-03-19 DIAGNOSIS — Z74.09 IMPAIRED FUNCTIONAL MOBILITY AND ACTIVITY TOLERANCE: ICD-10-CM

## 2024-03-19 PROCEDURE — 97110 THERAPEUTIC EXERCISES: CPT

## 2024-03-20 NOTE — PROGRESS NOTES
Physical Therapy Treatment Note     Name: Dayana Mejia  Clinic Number: [id number]    Therapy Diagnosis:   Encounter Diagnoses   Name Primary?    Left shoulder pain, unspecified chronicity Yes    Impaired functional mobility and activity tolerance      Physician: Tavo Villalta MD    Visit Date: 3/22/2024    Physician Orders: PT Eval and Treat  Medical Diagnosis from Referral: Pain in left shoulder  Evaluation Date: 12/14/2023  Authorization Period Expiration: None  Plan of Care Expiration: 3/14/2024  Visit # / Visits authorized: 21/ 24     Time In: 0920  Time Out: 1007  Total Billable Time: 47 minutes     Surgery: Left shoulder arthroscopic bursectomy, rotator cuff debridement, labral debridement, biceps release 7/6/23  Orthopedic Precautions: None  Pertinent History: History of C5-6, C6-7 ACDF 8/2/22    Subjective     Patient reports: No significant change. She got about 60% relief from shoulder injection she got at MD office on 03/18/24.      MRI Left Shoulder 03/07/2024  Impression:     A full-thickness tear is present to the intra-articular portion of the long head biceps tendon with retraction distal to the bicipital groove.  This was not present on the prior study.     A complex tear is present to the superior portion of the glenoid labrum and was not present on the prior study.     Calcific tendonitis is present to the distal infraspinatus tendon.  An intermediate grade partial-thickness bursal surface tear is present to the distal supraspinatus tendon and has worsened minimally since the prior study.  No retraction or associated atrophy is present.      CMS Impairment/Limitation/Restriction for FOTO Survey  Therapist reviewed FOTO scores for Dayana Mejia on 12/14/2023. FOTO documents entered into EPIC - see Media section.  FOTO filled out by: Patient  Patient's Physical FS Primary Measure: 44  Risk Adjusted Statistical FOTO: 45  Limitation Score: 56%  Category: Carrying  DASH: 57.5% disability    Therapist  reviewed FOTO scores for Dayana Mejia on 1/5/2023. FOTO documents entered into [employer] - see Media section.  FOTO filled out by: Patient  Patient's Physical FS Primary Measure: 56  Risk Adjusted Statistical FOTO: 45  Limitation Score: 44%  Category: Carrying  DASH: 30% disability    Therapist reviewed FOTO scores for Dayana Mejia on 2/6/2024. FOTO documents entered into EPIC - see Media section.  FOTO filled out by: Patient  Patient's Physical FS Primary Measure: 65  Risk Adjusted Statistical FOTO: 45  Limitation Score: 35%  Category: Carrying  DASH: 15.8% disability      Therapist reviewed FOTO scores for Dayana Mejia on 3/5/2024. FOTO documents entered into EPIC - see Media section.  FOTO filled out by: Patient  Patient's Physical FS Primary Measure: TBD  Risk Adjusted Statistical FOTO: 45  Limitation Score: TBD%  Category: Carrying  DASH: TBD% disability    Objective     Dayana received the following treatment:     Time Activities   Manual  Left - STM posterior left shoulder/scapula, PT-assisted prone scap retraction, shoulder distraction mobilizations, physiologic scapular retraction, posterior capsule stretch, anterior capsule stretch   TherAct  IFC to left shoulder/scapula (infraspinatus, teres major, anterior deltoid, middle deltoid) with MHP to left shoulder      Education       TherEx 47 min NuStep    Left shoulder (eccentric emphasis on all)  -band ER  -band IR  -dumbbell scaption  -dumbbell flexion (BFR)  -dumbbell abd (BFR)  -low row isometric (BFR)  -inferior glide isometric (BFR)        Cold pack and Biofreeze to left shoulder and scapula     Gait     Neuro Re-ed/Stim  Russian stim to left shoulder/scapula (infraspinatus, teres major, anterior deltoid, middle deltoid) with 5:5 cycle and 2 sec ramp up  -performed with above therex    IFC to left shoulder/scapula (infraspinatus, teres major, anterior deltoid, middle deltoid)       Home Exercises Provided and Patient Education Provided     Education provided:    -Plan of care, HEP    Assessment     Continuing therapy with low load, eccentric strengthening. We may limit activities done overhead given her injuries confirmed via MRI on 3/7/24 (SLAP tear with bicep tear and worsening supraspinatus tear). While she is able to perform the exercises, she has a great deal of difficulty with certain overhead activities. Pain remains and waxes/wanes, having direct impact on her functional capacity. We will continue with careful progression as tolerated.      Patient prognosis is Guarded.      Anticipated barriers to physical therapy: None    Goals: Dayana is working towards her goals.  Short Term Goals: 6 weeks   Patient will report at least 10% increase in functional capacity from initial QuickDash score to indicate clinically significant functional improvement (goal met)  Patient will report at least 10 point increase on initial FOTO score to indicate clinically significant functional improvement (goal met)        Long Term Goals: 12 weeks   Patient will report at least 20% increase in functional capacity from initial QuickDash score to indicate clinically significant functional improvement (goal met)  Patient will report at least 20 point increase on initial FOTO score to indicate clinically significant functional improvement (goal met)    Plan     2-3x/week x 12 weeks    Angelica Thomas, PT

## 2024-03-21 ENCOUNTER — LAB VISIT (OUTPATIENT)
Dept: LAB | Facility: HOSPITAL | Age: 64
End: 2024-03-21
Attending: INTERNAL MEDICINE
Payer: COMMERCIAL

## 2024-03-21 DIAGNOSIS — N18.1 CHRONIC KIDNEY DISEASE, STAGE I: ICD-10-CM

## 2024-03-21 DIAGNOSIS — N18.9 ANEMIA OF CHRONIC RENAL FAILURE, UNSPECIFIED CKD STAGE: Primary | ICD-10-CM

## 2024-03-21 DIAGNOSIS — E55.9 AVITAMINOSIS D: ICD-10-CM

## 2024-03-21 DIAGNOSIS — R80.9 PROTEINURIA, UNSPECIFIED TYPE: ICD-10-CM

## 2024-03-21 DIAGNOSIS — D63.1 ANEMIA OF CHRONIC RENAL FAILURE, UNSPECIFIED CKD STAGE: Primary | ICD-10-CM

## 2024-03-21 DIAGNOSIS — I10 HYPERTENSION, UNSPECIFIED TYPE: ICD-10-CM

## 2024-03-21 DIAGNOSIS — I43 DILATED CARDIOMYOPATHY SECONDARY TO ELECTROLYTE DEFICIENCY: ICD-10-CM

## 2024-03-21 DIAGNOSIS — E87.8 DILATED CARDIOMYOPATHY SECONDARY TO ELECTROLYTE DEFICIENCY: ICD-10-CM

## 2024-03-21 LAB
ALBUMIN SERPL-MCNC: 3.6 G/DL (ref 3.4–4.8)
APPEARANCE UR: CLEAR
BACTERIA #/AREA URNS AUTO: NORMAL /HPF
BILIRUB UR QL STRIP.AUTO: NEGATIVE
BUN SERPL-MCNC: 16.3 MG/DL (ref 9.8–20.1)
CALCIUM SERPL-MCNC: 9.8 MG/DL (ref 8.4–10.2)
CHLORIDE SERPL-SCNC: 105 MMOL/L (ref 98–107)
CO2 SERPL-SCNC: 27 MMOL/L (ref 23–31)
COLOR UR AUTO: YELLOW
CREAT SERPL-MCNC: 0.88 MG/DL (ref 0.55–1.02)
CREAT UR-MCNC: 64.2 MG/DL (ref 45–106)
DEPRECATED CALCIDIOL+CALCIFEROL SERPL-MC: 76.6 NG/ML (ref 30–80)
GFR SERPLBLD CREATININE-BSD FMLA CKD-EPI: >60 MLS/MIN/1.73/M2
GLUCOSE SERPL-MCNC: 104 MG/DL (ref 82–115)
GLUCOSE UR QL STRIP.AUTO: NEGATIVE
KETONES UR QL STRIP.AUTO: NEGATIVE
LEUKOCYTE ESTERASE UR QL STRIP.AUTO: NEGATIVE
MAGNESIUM SERPL-MCNC: 1.8 MG/DL (ref 1.6–2.6)
NITRITE UR QL STRIP.AUTO: NEGATIVE
PH UR STRIP.AUTO: 7 [PH]
PHOSPHATE SERPL-MCNC: 3.6 MG/DL (ref 2.3–4.7)
POTASSIUM SERPL-SCNC: 4.2 MMOL/L (ref 3.5–5.1)
PROT UR QL STRIP.AUTO: ABNORMAL
PROT UR STRIP-MCNC: 36.3 MG/DL
PTH-INTACT SERPL-MCNC: 42.6 PG/ML (ref 8.7–77)
RBC #/AREA URNS AUTO: NORMAL /HPF
RBC UR QL AUTO: NEGATIVE
SODIUM SERPL-SCNC: 139 MMOL/L (ref 136–145)
SP GR UR STRIP.AUTO: 1.01 (ref 1–1.03)
SQUAMOUS #/AREA URNS AUTO: NORMAL /HPF
URINE PROTEIN/CREATININE RATIO (OLG): 0.6
UROBILINOGEN UR STRIP-ACNC: 0.2
WBC #/AREA URNS AUTO: NORMAL /HPF

## 2024-03-21 PROCEDURE — 83735 ASSAY OF MAGNESIUM: CPT

## 2024-03-21 PROCEDURE — 81001 URINALYSIS AUTO W/SCOPE: CPT

## 2024-03-21 PROCEDURE — 36415 COLL VENOUS BLD VENIPUNCTURE: CPT

## 2024-03-21 PROCEDURE — 83970 ASSAY OF PARATHORMONE: CPT

## 2024-03-21 PROCEDURE — 80069 RENAL FUNCTION PANEL: CPT

## 2024-03-21 PROCEDURE — 82570 ASSAY OF URINE CREATININE: CPT

## 2024-03-21 PROCEDURE — 82306 VITAMIN D 25 HYDROXY: CPT

## 2024-03-22 ENCOUNTER — CLINICAL SUPPORT (OUTPATIENT)
Dept: REHABILITATION | Facility: HOSPITAL | Age: 64
End: 2024-03-22
Payer: COMMERCIAL

## 2024-03-22 DIAGNOSIS — M25.512 LEFT SHOULDER PAIN, UNSPECIFIED CHRONICITY: Primary | ICD-10-CM

## 2024-03-22 DIAGNOSIS — Z74.09 IMPAIRED FUNCTIONAL MOBILITY AND ACTIVITY TOLERANCE: ICD-10-CM

## 2024-03-22 PROCEDURE — 97110 THERAPEUTIC EXERCISES: CPT

## 2024-04-01 NOTE — PROGRESS NOTES
Physical Therapy Treatment Note     Name: Dayana Meija  Clinic Number: [id number]    Therapy Diagnosis:   Encounter Diagnoses   Name Primary?    Left shoulder pain, unspecified chronicity Yes    Impaired functional mobility and activity tolerance      Physician: Tavo Villalta MD    Visit Date: 4/2/2024    Physician Orders: PT Eval and Treat  Medical Diagnosis from Referral: Pain in left shoulder  Evaluation Date: 12/14/2023  Authorization Period Expiration: None  Plan of Care Expiration: 3/14/2024  Visit # / Visits authorized: 22/ 24     Time In: 0907  Time Out: 0957  Total Billable Time: 50 minutes     Surgery: Left shoulder arthroscopic bursectomy, rotator cuff debridement, labral debridement, biceps release 7/6/23  Orthopedic Precautions: None  Pertinent History: History of C5-6, C6-7 ACDF 8/2/22    Subjective     Patient reports: No significant change. Overall she feels better (and has easier time with functionality) after shoulder injection she got at MD office on 03/18/24, but she is not completely pain-free.      MRI Left Shoulder 03/07/2024  Impression:     A full-thickness tear is present to the intra-articular portion of the long head biceps tendon with retraction distal to the bicipital groove.  This was not present on the prior study.     A complex tear is present to the superior portion of the glenoid labrum and was not present on the prior study.     Calcific tendonitis is present to the distal infraspinatus tendon.  An intermediate grade partial-thickness bursal surface tear is present to the distal supraspinatus tendon and has worsened minimally since the prior study.  No retraction or associated atrophy is present.      CMS Impairment/Limitation/Restriction for FOTO Survey  Therapist reviewed FOTO scores for Dayana Mejia on 12/14/2023. FOTO documents entered into EPIC - see Media section.  FOTO filled out by: Patient  Patient's Physical FS Primary Measure: 44  Risk Adjusted Statistical FOTO:  45  Limitation Score: 56%  Category: Carrying  DASH: 57.5% disability    Therapist reviewed FOTO scores for Dayana Mejia on 1/5/2023. FOTO documents entered into EPIC - see Media section.  FOTO filled out by: Patient  Patient's Physical FS Primary Measure: 56  Risk Adjusted Statistical FOTO: 45  Limitation Score: 44%  Category: Carrying  DASH: 30% disability    Therapist reviewed FOTO scores for Dayana Mejia on 2/6/2024. FOTO documents entered into EPIC - see Media section.  FOTO filled out by: Patient  Patient's Physical FS Primary Measure: 65  Risk Adjusted Statistical FOTO: 45  Limitation Score: 35%  Category: Carrying  DASH: 15.8% disability      Therapist reviewed FOTO scores for Dayana Mejia on 4/2/2024. FOTO documents entered into EPIC - see Media section.  FOTO filled out by: Patient  Patient's Physical FS Primary Measure: 72  Risk Adjusted Statistical FOTO: 45  Limitation Score: 28%  Category: Carrying  DASH: 10% disability    Objective     Dayana received the following treatment:     Time Activities   Manual  Left - STM posterior left shoulder/scapula, PT-assisted prone scap retraction, shoulder distraction mobilizations, physiologic scapular retraction, posterior capsule stretch, anterior capsule stretch   TherAct  IFC to left shoulder/scapula (infraspinatus, teres major, anterior deltoid, middle deltoid) with MHP to left shoulder      Education       TherEx 50 min NuStep    Left shoulder (eccentric emphasis on all)  -dumbbell scaption  -dumbbell flexion  -dumbbell abd  -band ER  -band IR  -band low row (ext 30 deg to 0 deg)  -band inferior glide (add 90 deg to 45 deg)        Cold pack and Biofreeze to left shoulder and scapula     Gait     Neuro Re-ed/Stim  Russian stim to left shoulder/scapula (infraspinatus, teres major, anterior deltoid, middle deltoid) with 5:5 cycle and 2 sec ramp up  -performed with above therex    IFC to left shoulder/scapula (infraspinatus, teres major, anterior deltoid, middle  deltoid)       Home Exercises Provided and Patient Education Provided     Education provided:   -Plan of care, HEP    Assessment     Continuing therapy with low load, eccentric strengthening. We may limit activities done overhead given her injuries confirmed via MRI on 3/7/24 (SLAP tear with bicep tear and worsening supraspinatus tear). While she is able to perform the exercises, she has a great deal of difficulty with certain overhead activities. Pain remains and waxes/wanes, having direct impact on her functional capacity. We will continue with careful progression as tolerated.      Patient prognosis is Guarded.      Anticipated barriers to physical therapy: None    Goals: Dayana is working towards her goals.  Short Term Goals: 6 weeks   Patient will report at least 10% increase in functional capacity from initial QuickDash score to indicate clinically significant functional improvement (goal met)  Patient will report at least 10 point increase on initial FOTO score to indicate clinically significant functional improvement (goal met)        Long Term Goals: 12 weeks   Patient will report at least 20% increase in functional capacity from initial QuickDash score to indicate clinically significant functional improvement (goal met)  Patient will report at least 20 point increase on initial FOTO score to indicate clinically significant functional improvement (goal met)    Plan     2-3x/week x 12 weeks    Angelica Thomas, PT

## 2024-04-02 ENCOUNTER — CLINICAL SUPPORT (OUTPATIENT)
Dept: REHABILITATION | Facility: HOSPITAL | Age: 64
End: 2024-04-02
Payer: COMMERCIAL

## 2024-04-02 DIAGNOSIS — M25.512 LEFT SHOULDER PAIN, UNSPECIFIED CHRONICITY: Primary | ICD-10-CM

## 2024-04-02 DIAGNOSIS — Z74.09 IMPAIRED FUNCTIONAL MOBILITY AND ACTIVITY TOLERANCE: ICD-10-CM

## 2024-04-02 PROCEDURE — 97110 THERAPEUTIC EXERCISES: CPT

## 2024-04-08 NOTE — PROGRESS NOTES
Physical Therapy Treatment Note     Name: Dayana Mejia  Clinic Number: [id number]    Therapy Diagnosis:   Encounter Diagnoses   Name Primary?    Left shoulder pain, unspecified chronicity Yes    Impaired functional mobility and activity tolerance      Physician: Tavo Villalta MD    Visit Date: 4/9/2024    Physician Orders: PT Eval and Treat  Medical Diagnosis from Referral: Pain in left shoulder  Evaluation Date: 12/14/2023  Authorization Period Expiration: None  Plan of Care Expiration: 3/14/2024  Visit # / Visits authorized: 23/ 24     Time In: 0925  Time Out: 1005  Total Billable Time: 50 minutes     Surgery: Left shoulder arthroscopic bursectomy, rotator cuff debridement, labral debridement, biceps release 7/6/23  Orthopedic Precautions: None  Pertinent History: History of C5-6, C6-7 ACDF 8/2/22    Subjective     Patient reports: No significant change. Two bruised ribs, 1 cracked rib (both on left), after falling out of her chair the other day. Otherwise, her left shoulder has been doing very well. Minimal pain overall, some pinching from time to time over coracoid/anterior shoulder.      MRI Left Shoulder 03/07/2024  Impression:     A full-thickness tear is present to the intra-articular portion of the long head biceps tendon with retraction distal to the bicipital groove.  This was not present on the prior study.     A complex tear is present to the superior portion of the glenoid labrum and was not present on the prior study.     Calcific tendonitis is present to the distal infraspinatus tendon.  An intermediate grade partial-thickness bursal surface tear is present to the distal supraspinatus tendon and has worsened minimally since the prior study.  No retraction or associated atrophy is present.      CMS Impairment/Limitation/Restriction for FOTO Survey  Therapist reviewed FOTO scores for Dayana Mejia on 12/14/2023. FOTO documents entered into EPIC - see Media section.  FOTO filled out by:  Patient  Patient's Physical FS Primary Measure: 44  Risk Adjusted Statistical FOTO: 45  Limitation Score: 56%  Category: Carrying  DASH: 57.5% disability    Therapist reviewed FOTO scores for Dayana Mejia on 1/5/2023. FOTO documents entered into EPIC - see Media section.  FOTO filled out by: Patient  Patient's Physical FS Primary Measure: 56  Risk Adjusted Statistical FOTO: 45  Limitation Score: 44%  Category: Carrying  DASH: 30% disability    Therapist reviewed FOTO scores for Dayana Mejia on 2/6/2024. FOTO documents entered into EPIC - see Media section.  FOTO filled out by: Patient  Patient's Physical FS Primary Measure: 65  Risk Adjusted Statistical FOTO: 45  Limitation Score: 35%  Category: Carrying  DASH: 15.8% disability      Therapist reviewed FOTO scores for Dayana Mejia on 4/2/2024. FOTO documents entered into EPIC - see Media section.  FOTO filled out by: Patient  Patient's Physical FS Primary Measure: 72  Risk Adjusted Statistical FOTO: 45  Limitation Score: 28%  Category: Carrying  DASH: 10% disability    Objective     Dayana received the following treatment:     Time Activities   Manual  Left - STM posterior left shoulder/scapula, PT-assisted prone scap retraction, shoulder distraction mobilizations, physiologic scapular retraction, posterior capsule stretch, anterior capsule stretch   TherAct/Ex 40 min NuStep    Wall wipe, scaption    IFC to left shoulder/scapula (infraspinatus, teres major, anterior deltoid, middle deltoid)      Education       TherEx  NuStep    Left shoulder (eccentric emphasis on all)  -dumbbell scaption  -dumbbell flexion  -dumbbell abd  -band ER  -band IR  -band low row (ext 30 deg to 0 deg)  -band inferior glide (add 90 deg to 45 deg)        Cold pack and Biofreeze to left shoulder and scapula     Gait     Neuro Re-ed/Stim  Russian stim to left shoulder/scapula (infraspinatus, teres major, anterior deltoid, middle deltoid) with 5:5 cycle and 2 sec ramp up  -performed with above  therex    IFC to left shoulder/scapula (infraspinatus, teres major, anterior deltoid, middle deltoid)       Home Exercises Provided and Patient Education Provided     Education provided:   -Plan of care, HEP    Assessment     It appears that the injection is starting to take effect. She has been in a minimal pain state for quite a few days. We are experimenting on how many exercises/activities we do. I feel that the severity of her tears make it difficult (or anatomically impossible) to truly strengthen her rotator cuff, thus those same exercises would target agonist muscles to bear the load. However, we have been strengthening for such a long time without this level of pain relief that I am unsure that route to be efficacious. I feel the injection to have served the most regarding pain relief.     MRI on 3/7/24 shows SLAP tear with bicep tear and worsening supraspinatus tear. If we can maintain this current low pain state with no exercise, I will discharge her from PT.      Patient prognosis is Guarded.      Anticipated barriers to physical therapy: None    Goals: Dayana is working towards her goals.  Short Term Goals: 6 weeks   Patient will report at least 10% increase in functional capacity from initial QuickDash score to indicate clinically significant functional improvement (goal met)  Patient will report at least 10 point increase on initial FOTO score to indicate clinically significant functional improvement (goal met)        Long Term Goals: 12 weeks   Patient will report at least 20% increase in functional capacity from initial QuickDash score to indicate clinically significant functional improvement (goal met)  Patient will report at least 20 point increase on initial FOTO score to indicate clinically significant functional improvement (goal met)    Plan     2-3x/week x 12 weeks    Angelica Thomas, PT

## 2024-04-09 ENCOUNTER — CLINICAL SUPPORT (OUTPATIENT)
Dept: REHABILITATION | Facility: HOSPITAL | Age: 64
End: 2024-04-09
Payer: COMMERCIAL

## 2024-04-09 DIAGNOSIS — Z74.09 IMPAIRED FUNCTIONAL MOBILITY AND ACTIVITY TOLERANCE: ICD-10-CM

## 2024-04-09 DIAGNOSIS — M25.512 LEFT SHOULDER PAIN, UNSPECIFIED CHRONICITY: Primary | ICD-10-CM

## 2024-04-09 PROCEDURE — 97530 THERAPEUTIC ACTIVITIES: CPT

## 2024-04-09 PROCEDURE — 97110 THERAPEUTIC EXERCISES: CPT

## 2024-04-17 NOTE — PROGRESS NOTES
Physical Therapy Treatment Note     Name: Dayana Mejia  Clinic Number: [id number]    Therapy Diagnosis:   Encounter Diagnoses   Name Primary?    Left shoulder pain, unspecified chronicity Yes    Impaired functional mobility and activity tolerance      Physician: Tavo Villalta MD    Visit Date: 4/19/2024    Physician Orders: PT Eval and Treat  Medical Diagnosis from Referral: Pain in left shoulder  Evaluation Date: 12/14/2023  Authorization Period Expiration: None  Plan of Care Expiration: 3/14/2024  Visit # / Visits authorized: 24/ 24     Time In: 0915  Time Out: 0947  Total Billable Time: 32 minutes     Surgery: Left shoulder arthroscopic bursectomy, rotator cuff debridement, labral debridement, biceps release 7/6/23  Orthopedic Precautions: None  Pertinent History: History of C5-6, C6-7 ACDF 8/2/22    Subjective     Patient reports: No significant change. She is starting to have increasing pain in her left shoulder.      MRI Left Shoulder 03/07/2024  Impression:     A full-thickness tear is present to the intra-articular portion of the long head biceps tendon with retraction distal to the bicipital groove.  This was not present on the prior study.     A complex tear is present to the superior portion of the glenoid labrum and was not present on the prior study.     Calcific tendonitis is present to the distal infraspinatus tendon.  An intermediate grade partial-thickness bursal surface tear is present to the distal supraspinatus tendon and has worsened minimally since the prior study.  No retraction or associated atrophy is present.      CMS Impairment/Limitation/Restriction for FOTO Survey  Therapist reviewed FOTO scores for Dayana Mejia on 12/14/2023. FOTO documents entered into EPIC - see Media section.  FOTO filled out by: Patient  Patient's Physical FS Primary Measure: 44  Risk Adjusted Statistical FOTO: 45  Limitation Score: 56%  Category: Carrying  DASH: 57.5% disability    Therapist reviewed FOTO scores  for Dayana Mejia on 1/5/2023. FOTO documents entered into EPIC - see Media section.  FOTO filled out by: Patient  Patient's Physical FS Primary Measure: 56  Risk Adjusted Statistical FOTO: 45  Limitation Score: 44%  Category: Carrying  DASH: 30% disability    Therapist reviewed FOTO scores for Dayana Mejia on 2/6/2024. FOTO documents entered into EPIC - see Media section.  FOTO filled out by: Patient  Patient's Physical FS Primary Measure: 65  Risk Adjusted Statistical FOTO: 45  Limitation Score: 35%  Category: Carrying  DASH: 15.8% disability      Therapist reviewed FOTO scores for Dayana Mejia on 4/2/2024. FOTO documents entered into EPIC - see Media section.  FOTO filled out by: Patient  Patient's Physical FS Primary Measure: 72  Risk Adjusted Statistical FOTO: 45  Limitation Score: 28%  Category: Carrying  DASH: 10% disability      Therapist reviewed FOTO scores for Dayana Mejia on 4/19/2024. FOTO documents entered into EPIC - see Media section.  FOTO filled out by: Patient  Patient's Physical FS Primary Measure: 73  Risk Adjusted Statistical FOTO: 45  Limitation Score: 27%  Category: Carrying  DASH: 9.2% disability      Objective     Dayana received the following treatment:     Time Activities   Manual  Left - STM posterior left shoulder/scapula, PT-assisted prone scap retraction, shoulder distraction mobilizations, physiologic scapular retraction, posterior capsule stretch, anterior capsule stretch   TherAct/Ex 32 min NuStep    Education       TherEx  NuStep    Left shoulder (eccentric emphasis on all)  -dumbbell scaption  -dumbbell flexion  -dumbbell abd  -band ER  -band IR  -band low row (ext 30 deg to 0 deg)  -band inferior glide (add 90 deg to 45 deg)        Cold pack and Biofreeze to left shoulder and scapula     Gait     Neuro Re-ed/Stim  Russian stim to left shoulder/scapula (infraspinatus, teres major, anterior deltoid, middle deltoid) with 5:5 cycle and 2 sec ramp up  -performed with above therex    IFC to left  shoulder/scapula (infraspinatus, teres major, anterior deltoid, middle deltoid)       Home Exercises Provided and Patient Education Provided     Education provided:   -Plan of care, HEP    Assessment     While injection was proving efficacious, as time went on it appears it began to wear off. I feel that the severity of her tears make it difficult (or anatomically impossible) to truly strengthen her rotator cuff, especially given the length of time that we have been undergoing exercise. Even with those same exercises building agonist muscles to bear the load, pain remains the same with certain activities especially those involving overhead movement. I feel the injection to have served the most benefit regarding pain relief, however the root cause is still at large and likely to remain with further medical management which I suspect will be surgical. We will discharge at this time. We discussed many times the types of activities for her to avoid if possible, and activity modification to keep pain at a minimum.    Patient prognosis is Guarded.      Anticipated barriers to physical therapy: None    Goals: Dayana is working towards her goals.  Short Term Goals: 6 weeks   Patient will report at least 10% increase in functional capacity from initial QuickDash score to indicate clinically significant functional improvement (goal met)  Patient will report at least 10 point increase on initial FOTO score to indicate clinically significant functional improvement (goal met)        Long Term Goals: 12 weeks   Patient will report at least 20% increase in functional capacity from initial QuickDash score to indicate clinically significant functional improvement (goal met)  Patient will report at least 20 point increase on initial FOTO score to indicate clinically significant functional improvement (goal met)    Plan     Discharge to independent [facility]    Angelica Tohmas, PT

## 2024-04-19 ENCOUNTER — CLINICAL SUPPORT (OUTPATIENT)
Dept: REHABILITATION | Facility: HOSPITAL | Age: 64
End: 2024-04-19
Payer: COMMERCIAL

## 2024-04-19 DIAGNOSIS — M25.512 LEFT SHOULDER PAIN, UNSPECIFIED CHRONICITY: Primary | ICD-10-CM

## 2024-04-19 DIAGNOSIS — Z74.09 IMPAIRED FUNCTIONAL MOBILITY AND ACTIVITY TOLERANCE: ICD-10-CM

## 2024-04-19 PROCEDURE — 97110 THERAPEUTIC EXERCISES: CPT

## 2024-07-31 ENCOUNTER — HOSPITAL ENCOUNTER (OUTPATIENT)
Dept: RADIOLOGY | Facility: HOSPITAL | Age: 64
Discharge: HOME | End: 2024-07-31
Attending: SPECIALIST
Payer: COMMERCIAL

## 2024-07-31 ENCOUNTER — CLINICAL SUPPORT (OUTPATIENT)
Dept: RESPIRATORY THERAPY | Facility: HOSPITAL | Age: 64
End: 2024-07-31
Attending: SPECIALIST
Payer: COMMERCIAL

## 2024-07-31 DIAGNOSIS — S46.002D INJURY OF TENDON OF LEFT ROTATOR CUFF, SUBSEQUENT ENCOUNTER: Primary | ICD-10-CM

## 2024-07-31 DIAGNOSIS — Z01.811 PRE-OP CHEST EXAM: ICD-10-CM

## 2024-07-31 DIAGNOSIS — S46.002D INJURY OF TENDON OF LEFT ROTATOR CUFF, SUBSEQUENT ENCOUNTER: ICD-10-CM

## 2024-07-31 LAB
OHS QRS DURATION: 84 MS
OHS QTC CALCULATION: 408 MS

## 2024-07-31 PROCEDURE — 93005 ELECTROCARDIOGRAM TRACING: CPT

## 2024-07-31 PROCEDURE — 93010 ELECTROCARDIOGRAM REPORT: CPT | Mod: ,,, | Performed by: INTERNAL MEDICINE

## 2024-07-31 PROCEDURE — 71046 X-RAY EXAM CHEST 2 VIEWS: CPT | Mod: TC

## 2024-07-31 NOTE — DISCHARGE INSTRUCTIONS
Nothing to eat or drink after midnight. Take Valsartan AM of procedure with small sip of water.       POST-OP INSTRUCTIONS FOR ROTATOR CUFF REPAIR    Tavo Jerome M.D.     Orthopedic Surgery/Sports Medicine     [address] [facility] [address] [place], [place] [address]     [place] [id number]     Office: [phone]     YOU MUST REMAIN IN ABDUCTION PILLOW DUE TO REPAIR OF TENDON. WHEN   YOU BATHE OR CHANGE CLOTHES, YOU MAY REMOVE ABDUCTION PILLOW BUT BOWL   UNDER ARM.YOU MUST KEEP ARM ELEVATED. FOR BATHING, YOU MAY PLACE   PLASTIC BOWL UNDER ARM.    2.  KEEP DRESSING OVER INCISION FOR TWO DAYS.    3.  REMOVE BANDAGE AFTER TWO DAYS UNLESS INSTRUCTED OTHERWISE.    4.  YOU MUST KEEP A STERILE BANDAGE OR GAUZE OVER SURGERY SITE   UNTIL THERE IS NO DRAINAGE FROM WOUND.    5.  WASH INCISION WITH ANTIBACTERIAL SOAP AND WATER.    6. REFRAIN FROM USING HYDROGEN PEROXIDE OR ANTIBIOTIC OINTMENTS  UNLESS INSTRUCTED TO DO SO.    7. DO NOT SOAK INCISION IN BATHTUB.    8. CALL DR JEROME OR NOTIFY STAFF IN CASE OF FEVER  (GREATER THAN 101.5, EXCESSIVE DRAINAGE FROM SURGERY SITE,   REDNESS OR SEVERE PAIN).    9. PHYSICAL THERAPY WILL START SOMETIME AFTER YOUR   FIRST APPOINTMENT FOLLOWING SURGERY.

## 2024-08-07 ENCOUNTER — PATIENT MESSAGE (OUTPATIENT)
Dept: ADMINISTRATIVE | Facility: OTHER | Age: 64
End: 2024-08-07
Payer: COMMERCIAL

## 2024-08-07 ENCOUNTER — ANESTHESIA EVENT (OUTPATIENT)
Dept: SURGERY | Facility: HOSPITAL | Age: 64
End: 2024-08-07
Payer: COMMERCIAL

## 2024-08-08 ENCOUNTER — ANESTHESIA (OUTPATIENT)
Dept: SURGERY | Facility: HOSPITAL | Age: 64
End: 2024-08-08
Payer: COMMERCIAL

## 2024-08-08 ENCOUNTER — HOSPITAL ENCOUNTER (OUTPATIENT)
Facility: HOSPITAL | Age: 64
Discharge: HOME | End: 2024-08-08
Attending: SPECIALIST | Admitting: SPECIALIST
Payer: COMMERCIAL

## 2024-08-08 VITALS
RESPIRATION RATE: 18 BRPM | HEART RATE: 84 BPM | TEMPERATURE: 98 F | SYSTOLIC BLOOD PRESSURE: 106 MMHG | DIASTOLIC BLOOD PRESSURE: 44 MMHG | HEIGHT: 65 IN | OXYGEN SATURATION: 95 % | BODY MASS INDEX: 23.83 KG/M2 | WEIGHT: 143.06 LBS

## 2024-08-08 DIAGNOSIS — S43.432D TEAR OF LEFT GLENOID LABRUM, SUBSEQUENT ENCOUNTER: ICD-10-CM

## 2024-08-08 DIAGNOSIS — M75.102 ROTATOR CUFF TEAR, LEFT: Primary | ICD-10-CM

## 2024-08-08 PROBLEM — S43.432A TEAR OF LEFT GLENOID LABRUM: Status: ACTIVE | Noted: 2024-08-08

## 2024-08-08 LAB
POCT GLUCOSE: 126 MG/DL (ref 70–110)
POCT GLUCOSE: 154 MG/DL (ref 70–110)

## 2024-08-08 PROCEDURE — 37000008 HC ANESTHESIA 1ST 15 MINUTES: Performed by: SPECIALIST

## 2024-08-08 PROCEDURE — 36000711: Performed by: SPECIALIST

## 2024-08-08 PROCEDURE — 37000009 HC ANESTHESIA EA ADD 15 MINS: Performed by: SPECIALIST

## 2024-08-08 PROCEDURE — C1713 ANCHOR/SCREW BN/BN,TIS/BN: HCPCS | Performed by: SPECIALIST

## 2024-08-08 PROCEDURE — 71000016 HC POSTOP RECOV ADDL HR: Performed by: SPECIALIST

## 2024-08-08 PROCEDURE — 63600175 PHARM REV CODE 636 W HCPCS: Performed by: PHYSICIAN ASSISTANT

## 2024-08-08 PROCEDURE — 27201423 OPTIME MED/SURG SUP & DEVICES STERILE SUPPLY: Performed by: SPECIALIST

## 2024-08-08 PROCEDURE — 71000015 HC POSTOP RECOV 1ST HR: Performed by: SPECIALIST

## 2024-08-08 PROCEDURE — 63600175 PHARM REV CODE 636 W HCPCS: Performed by: SPECIALIST

## 2024-08-08 PROCEDURE — 82962 GLUCOSE BLOOD TEST: CPT | Performed by: SPECIALIST

## 2024-08-08 PROCEDURE — 63600175 PHARM REV CODE 636 W HCPCS: Performed by: ANESTHESIOLOGY

## 2024-08-08 PROCEDURE — 63600175 PHARM REV CODE 636 W HCPCS: Performed by: NURSE ANESTHETIST, CERTIFIED REGISTERED

## 2024-08-08 PROCEDURE — 71000033 HC RECOVERY, INTIAL HOUR: Performed by: SPECIALIST

## 2024-08-08 PROCEDURE — 64415 NJX AA&/STRD BRCH PLXS IMG: CPT | Performed by: NURSE ANESTHETIST, CERTIFIED REGISTERED

## 2024-08-08 PROCEDURE — 25000003 PHARM REV CODE 250: Performed by: NURSE ANESTHETIST, CERTIFIED REGISTERED

## 2024-08-08 PROCEDURE — 63600175 PHARM REV CODE 636 W HCPCS: Mod: JZ,JG | Performed by: ANESTHESIOLOGY

## 2024-08-08 PROCEDURE — 36000710: Performed by: SPECIALIST

## 2024-08-08 PROCEDURE — 25000003 PHARM REV CODE 250: Performed by: ANESTHESIOLOGY

## 2024-08-08 DEVICE — VERSALOK ANCHOR W/ORTHOCORD PEEK & TITANIUM ANCHOR (1) VIOLET (1) BLUE STRAND, SIZE 2 (5 METRIC) ORTHOCORD BRAIDED COMPOSITE SUTURE, 36 INCHES (91CM)
Type: IMPLANTABLE DEVICE | Site: SHOULDER | Status: FUNCTIONAL
Brand: VERSALOK ORTHOCORD

## 2024-08-08 DEVICE — IMPLANTABLE DEVICE: Type: IMPLANTABLE DEVICE | Site: SHOULDER | Status: FUNCTIONAL

## 2024-08-08 DEVICE — SUTR ANCH,PEEK P-LCK,2.9X 12.5MM
Type: IMPLANTABLE DEVICE | Site: SHOULDER | Status: FUNCTIONAL
Brand: ARTHREX®

## 2024-08-08 RX ORDER — ONDANSETRON HYDROCHLORIDE 2 MG/ML
INJECTION, SOLUTION INTRAVENOUS
Status: DISCONTINUED | OUTPATIENT
Start: 2024-08-08 | End: 2024-08-08

## 2024-08-08 RX ORDER — HYDROCODONE BITARTRATE AND ACETAMINOPHEN 10; 325 MG/1; MG/1
1 TABLET ORAL EVERY 6 HOURS PRN
Qty: 28 TABLET | Refills: 0 | Status: SHIPPED | OUTPATIENT
Start: 2024-08-08

## 2024-08-08 RX ORDER — MORPHINE SULFATE 4 MG/ML
4 INJECTION, SOLUTION INTRAMUSCULAR; INTRAVENOUS EVERY 4 HOURS PRN
Status: DISCONTINUED | OUTPATIENT
Start: 2024-08-08 | End: 2024-08-08 | Stop reason: HOSPADM

## 2024-08-08 RX ORDER — PHENYLEPHRINE HYDROCHLORIDE 10 MG/ML
INJECTION INTRAVENOUS
Status: DISCONTINUED | OUTPATIENT
Start: 2024-08-08 | End: 2024-08-08

## 2024-08-08 RX ORDER — DEXAMETHASONE SODIUM PHOSPHATE 4 MG/ML
INJECTION, SOLUTION INTRA-ARTICULAR; INTRALESIONAL; INTRAMUSCULAR; INTRAVENOUS; SOFT TISSUE
Status: DISCONTINUED | OUTPATIENT
Start: 2024-08-08 | End: 2024-08-08

## 2024-08-08 RX ORDER — OXYCODONE HYDROCHLORIDE 5 MG/1
10 TABLET ORAL EVERY 6 HOURS PRN
Status: DISCONTINUED | OUTPATIENT
Start: 2024-08-08 | End: 2024-08-08 | Stop reason: HOSPADM

## 2024-08-08 RX ORDER — ASPIRIN 81 MG/1
81 TABLET ORAL DAILY
Qty: 42 TABLET | Refills: 0 | Status: SHIPPED | OUTPATIENT
Start: 2024-08-08 | End: 2024-09-19

## 2024-08-08 RX ORDER — CEFAZOLIN SODIUM 2 G/50ML
2 SOLUTION INTRAVENOUS
Status: COMPLETED | OUTPATIENT
Start: 2024-08-08 | End: 2024-08-08

## 2024-08-08 RX ORDER — TRAMADOL HYDROCHLORIDE 50 MG/1
50 TABLET ORAL EVERY 6 HOURS PRN
Status: DISCONTINUED | OUTPATIENT
Start: 2024-08-08 | End: 2024-08-08 | Stop reason: HOSPADM

## 2024-08-08 RX ORDER — TRIAMCINOLONE ACETONIDE 40 MG/ML
INJECTION, SUSPENSION INTRA-ARTICULAR; INTRAMUSCULAR
Status: DISCONTINUED | OUTPATIENT
Start: 2024-08-08 | End: 2024-08-08 | Stop reason: HOSPADM

## 2024-08-08 RX ORDER — SODIUM CHLORIDE 0.9 % (FLUSH) 0.9 %
10 SYRINGE (ML) INJECTION
Status: DISCONTINUED | OUTPATIENT
Start: 2024-08-08 | End: 2024-08-08

## 2024-08-08 RX ORDER — FENTANYL CITRATE 50 UG/ML
25 INJECTION, SOLUTION INTRAMUSCULAR; INTRAVENOUS EVERY 5 MIN PRN
Status: DISCONTINUED | OUTPATIENT
Start: 2024-08-08 | End: 2024-08-08

## 2024-08-08 RX ORDER — ONDANSETRON 4 MG/1
8 TABLET, ORALLY DISINTEGRATING ORAL EVERY 8 HOURS PRN
Status: DISCONTINUED | OUTPATIENT
Start: 2024-08-08 | End: 2024-08-08 | Stop reason: HOSPADM

## 2024-08-08 RX ORDER — ONDANSETRON 4 MG/1
8 TABLET, ORALLY DISINTEGRATING ORAL EVERY 6 HOURS PRN
Status: DISCONTINUED | OUTPATIENT
Start: 2024-08-08 | End: 2024-08-08 | Stop reason: HOSPADM

## 2024-08-08 RX ORDER — ONDANSETRON HYDROCHLORIDE 2 MG/ML
4 INJECTION, SOLUTION INTRAVENOUS EVERY 12 HOURS PRN
Status: DISCONTINUED | OUTPATIENT
Start: 2024-08-08 | End: 2024-08-08 | Stop reason: HOSPADM

## 2024-08-08 RX ORDER — LIDOCAINE HYDROCHLORIDE 20 MG/ML
INJECTION, SOLUTION EPIDURAL; INFILTRATION; INTRACAUDAL; PERINEURAL
Status: DISCONTINUED | OUTPATIENT
Start: 2024-08-08 | End: 2024-08-08

## 2024-08-08 RX ORDER — ACETAMINOPHEN 500 MG
1000 TABLET ORAL
Status: COMPLETED | OUTPATIENT
Start: 2024-08-08 | End: 2024-08-08

## 2024-08-08 RX ORDER — MIDAZOLAM HYDROCHLORIDE 1 MG/ML
INJECTION INTRAMUSCULAR; INTRAVENOUS
Status: DISCONTINUED | OUTPATIENT
Start: 2024-08-08 | End: 2024-08-08

## 2024-08-08 RX ORDER — KETOROLAC TROMETHAMINE 10 MG/1
10 TABLET, FILM COATED ORAL EVERY 8 HOURS PRN
Qty: 20 TABLET | Refills: 0 | Status: SHIPPED | OUTPATIENT
Start: 2024-08-08

## 2024-08-08 RX ORDER — PROPOFOL 10 MG/ML
INJECTION, EMULSION INTRAVENOUS
Status: DISCONTINUED | OUTPATIENT
Start: 2024-08-08 | End: 2024-08-08

## 2024-08-08 RX ORDER — BUPIVACAINE HYDROCHLORIDE 2.5 MG/ML
INJECTION, SOLUTION EPIDURAL; INFILTRATION; INTRACAUDAL
Status: DISCONTINUED | OUTPATIENT
Start: 2024-08-08 | End: 2024-08-08

## 2024-08-08 RX ORDER — CEFADROXIL 500 MG/1
500 CAPSULE ORAL EVERY 12 HOURS
Qty: 20 CAPSULE | Refills: 0 | Status: SHIPPED | OUTPATIENT
Start: 2024-08-08 | End: 2024-08-18

## 2024-08-08 RX ORDER — KETOROLAC TROMETHAMINE 30 MG/ML
INJECTION, SOLUTION INTRAMUSCULAR; INTRAVENOUS
Status: DISCONTINUED | OUTPATIENT
Start: 2024-08-08 | End: 2024-08-08

## 2024-08-08 RX ORDER — FENTANYL CITRATE 50 UG/ML
INJECTION, SOLUTION INTRAMUSCULAR; INTRAVENOUS
Status: DISCONTINUED | OUTPATIENT
Start: 2024-08-08 | End: 2024-08-08

## 2024-08-08 RX ORDER — EPINEPHRINE 1 MG/ML
INJECTION, SOLUTION, CONCENTRATE INTRAVENOUS
Status: DISCONTINUED | OUTPATIENT
Start: 2024-08-08 | End: 2024-08-08 | Stop reason: HOSPADM

## 2024-08-08 RX ORDER — DROPERIDOL 2.5 MG/ML
0.62 INJECTION, SOLUTION INTRAMUSCULAR; INTRAVENOUS ONCE AS NEEDED
Status: DISCONTINUED | OUTPATIENT
Start: 2024-08-08 | End: 2024-08-08

## 2024-08-08 RX ORDER — BUPIVACAINE HYDROCHLORIDE 2.5 MG/ML
INJECTION, SOLUTION EPIDURAL; INFILTRATION; INTRACAUDAL
Status: DISCONTINUED | OUTPATIENT
Start: 2024-08-08 | End: 2024-08-08 | Stop reason: HOSPADM

## 2024-08-08 RX ORDER — SODIUM CHLORIDE, SODIUM LACTATE, POTASSIUM CHLORIDE, CALCIUM CHLORIDE 600; 310; 30; 20 MG/100ML; MG/100ML; MG/100ML; MG/100ML
INJECTION, SOLUTION INTRAVENOUS CONTINUOUS
Status: DISCONTINUED | OUTPATIENT
Start: 2024-08-08 | End: 2024-08-08 | Stop reason: HOSPADM

## 2024-08-08 RX ORDER — GLUCAGON 1 MG
1 KIT INJECTION
Status: DISCONTINUED | OUTPATIENT
Start: 2024-08-08 | End: 2024-08-08

## 2024-08-08 RX ORDER — GABAPENTIN 300 MG/1
300 CAPSULE ORAL
Status: COMPLETED | OUTPATIENT
Start: 2024-08-08 | End: 2024-08-08

## 2024-08-08 RX ADMIN — BUPIVACAINE HYDROCHLORIDE 15 ML: 2.5 INJECTION, SOLUTION EPIDURAL; INFILTRATION; INTRACAUDAL; PERINEURAL at 08:08

## 2024-08-08 RX ADMIN — CEFAZOLIN SODIUM 2 G: 2 SOLUTION INTRAVENOUS at 06:08

## 2024-08-08 RX ADMIN — SODIUM CHLORIDE, POTASSIUM CHLORIDE, SODIUM LACTATE AND CALCIUM CHLORIDE: 600; 310; 30; 20 INJECTION, SOLUTION INTRAVENOUS at 07:08

## 2024-08-08 RX ADMIN — LIDOCAINE HYDROCHLORIDE 100 MG: 20 INJECTION, SOLUTION EPIDURAL; INFILTRATION; INTRACAUDAL; PERINEURAL at 06:08

## 2024-08-08 RX ADMIN — DEXAMETHASONE SODIUM PHOSPHATE 4 MG: 4 INJECTION, SOLUTION INTRA-ARTICULAR; INTRALESIONAL; INTRAMUSCULAR; INTRAVENOUS; SOFT TISSUE at 06:08

## 2024-08-08 RX ADMIN — PHENYLEPHRINE HYDROCHLORIDE 100 MCG: 10 INJECTION INTRAVENOUS at 07:08

## 2024-08-08 RX ADMIN — FENTANYL CITRATE 50 MCG: 50 INJECTION, SOLUTION INTRAMUSCULAR; INTRAVENOUS at 07:08

## 2024-08-08 RX ADMIN — ACETAMINOPHEN 1000 MG: 500 TABLET, FILM COATED ORAL at 05:08

## 2024-08-08 RX ADMIN — SODIUM CHLORIDE, POTASSIUM CHLORIDE, SODIUM LACTATE AND CALCIUM CHLORIDE: 600; 310; 30; 20 INJECTION, SOLUTION INTRAVENOUS at 05:08

## 2024-08-08 RX ADMIN — FENTANYL CITRATE 25 MCG: 50 INJECTION, SOLUTION INTRAMUSCULAR; INTRAVENOUS at 07:08

## 2024-08-08 RX ADMIN — ONDANSETRON 4 MG: 2 INJECTION INTRAMUSCULAR; INTRAVENOUS at 06:08

## 2024-08-08 RX ADMIN — PROPOFOL 200 MG: 10 INJECTION, EMULSION INTRAVENOUS at 06:08

## 2024-08-08 RX ADMIN — FENTANYL CITRATE 50 MCG: 50 INJECTION, SOLUTION INTRAMUSCULAR; INTRAVENOUS at 06:08

## 2024-08-08 RX ADMIN — GABAPENTIN 300 MG: 300 CAPSULE ORAL at 05:08

## 2024-08-08 RX ADMIN — KETOROLAC TROMETHAMINE 15 MG: 30 INJECTION, SOLUTION INTRAMUSCULAR at 07:08

## 2024-08-08 RX ADMIN — MIDAZOLAM 2 MG: 1 INJECTION INTRAMUSCULAR; INTRAVENOUS at 06:08

## 2024-08-08 NOTE — OP NOTE
Operative note     Date: 8/8/2024     Preop diagnosis:  Shoulder rotator cuff tear, supraspinatus    Postop diagnosis:  Supraspinatus rotator cuff tear right shoulder, slap tear 10 o'clock position on the labrum  Procedure:  Arthroscopy right shoulder with supraspinatus rotator cuff repair using 1 single triple arm primary anchor and 1 double-arm anchor, slap lesion repair using Arthrex anchor    Surgeon: Tavo Villalta M.D.    Assistant: PACO Mcfadden    Anesthesia:  General    Complications: none    Blood loss: nil    Procedure in detail:  Informed consent was obtained.  Risks of the procedure was explained not excluding infection, bleeding, pain, scarring, neurovascular injury, re-rupture.  She was given IV antibiotics placed in lateral decubitus position.  Right shoulder was examined she had a previously torn long head of the biceps tendon which was not visible.  She had a significant SLAP lesion which I elected to repair.  Created an anterior portal scarify the 10 o'clock position on the glenoid used a Lasso and passed a labral tape.  Drilled the hole and impacted a Arthrex anchor secured and the labrum securely down to bone.  Next the cuff was inspected carefully.  She did have a supraspinatus tear large portion of the was high-grade partial but she did have a small area of full-thickness.  We marked with a Prolene.  Went on the top side did a mini deltoid split scarify the tuberosity put in a triple armed suture anchor and repair of the rotator cuff.  I used an additional double row anchor.  Repair was satisfactory wound was irrigated fascia was closed 0 Vicryl subQ 2-0 staples sterile dressing applied.  No complications.    Tavo Villalta M.D.

## 2024-08-09 NOTE — DISCHARGE SUMMARY
[facility] - Periop Services  Discharge Note  Short Stay    Procedure(s) (LRB):  REPAIR, ROTATOR CUFF, ARTHROSCOPIC (Left)  REPAIR,LABRUM,SHOULDER (Left)      OUTCOME: Patient tolerated treatment/procedure well without complication and is now ready for discharge.    DISPOSITION: Home or Self Care    FINAL DIAGNOSIS:  Rotator cuff tear, left    FOLLOWUP: In clinic    DISCHARGE INSTRUCTIONS:    Discharge Procedure Orders   Diet general     Keep surgical extremity elevated     Ice to affected area   Order Comments: using barrier between ice and skin (specify duration&frequency)     No driving, operating heavy equipment or signing legal documents while taking pain medication     Call MD for:  temperature >100.4     Call MD for:  persistent nausea and vomiting     Call MD for:  severe uncontrolled pain     Call MD for:  difficulty breathing, headache or visual disturbances     Call MD for:  redness, tenderness, or signs of infection (pain, swelling, redness, odor or green/yellow discharge around incision site)     Call MD for:  hives     Call MD for:  persistent dizziness or light-headedness     Call MD for:  extreme fatigue     Lifting restrictions   Order Comments: No lifting with the affected extremity     Remove dressing in 48 hours         Clinical Reference Documents Added to Patient Instructions         Document    ROTATOR CUFF INJURY DISCHARGE INSTRUCTIONS (ENGLISH)            TIME SPENT ON DISCHARGE: 15 minutes

## 2024-08-26 DIAGNOSIS — S43.432D SUPERIOR GLENOID LABRUM LESION OF LEFT SHOULDER, SUBSEQUENT ENCOUNTER: ICD-10-CM

## 2024-08-26 DIAGNOSIS — S46.102D: ICD-10-CM

## 2024-08-26 DIAGNOSIS — M25.512 LEFT SHOULDER PAIN: Primary | ICD-10-CM

## 2024-08-27 ENCOUNTER — CLINICAL SUPPORT (OUTPATIENT)
Dept: REHABILITATION | Facility: HOSPITAL | Age: 64
End: 2024-08-27
Payer: COMMERCIAL

## 2024-08-27 DIAGNOSIS — Z98.890 S/P LEFT ROTATOR CUFF REPAIR: Primary | ICD-10-CM

## 2024-08-27 DIAGNOSIS — Z98.890 STATUS POST LABRAL REPAIR OF SHOULDER: ICD-10-CM

## 2024-08-27 PROCEDURE — 97163 PT EVAL HIGH COMPLEX 45 MIN: CPT

## 2024-08-27 PROCEDURE — 97140 MANUAL THERAPY 1/> REGIONS: CPT

## 2024-08-27 PROCEDURE — 97110 THERAPEUTIC EXERCISES: CPT

## 2024-08-28 NOTE — PROGRESS NOTES
[facility]   Physical Therapy Initial Evaluation     Date: 8/27/2024   Name: Dayana Mejia  Clinic Number: [id number]    Therapy Diagnosis:   Encounter Diagnoses   Name Primary?    S/P left rotator cuff repair Yes    Status post labral repair of shoulder      Physician: Tavo Villalta MD    Physician Orders: PT Eval and Treat  Medical Diagnosis from Referral: Rotator cuff tear Supraspinatus, SLAP lesion  Evaluation Date: 8/27/2024  Authorization Period Expiration: medically necessary  Plan of Care Expiration: 11/19/2024  Visit # / Visits authorized: medically necessary    Time In: 1110  Time Out: 1230  Total Appointment Time (timed & untimed codes): 80 minutes  Total Treatment time (time-based codes) separate from Evaluation: 50 minutes    Surgery: Rotator cuff repair, Labral repair left shoulder  Orthopedic Precautions: Standard rotator cuff precautions; Labral precautions  Pertinent History: see medical chart    SUBJECTIVE     Date of onset: 8/08/2024    History of current condition - Dayana reports: Dayana reports: She went througha series of physical therapy for her left shoulder pain and did well. She continued with an independent HEP. She was doing very well, gradually progressing weight with all activities until one specific incident when she went to lift 8# for an exercise and felt a twinge/pain in her left shoulder. Pain lingered for 2 weeks with gradually worsening intensity. She sought medical care at surgeon who did her surgery and they requested an MRI. MRI denied, X-ray done instead but nothing acute showed. She was then sent to PT but did not respond this time. She eventually got an MRI which revealed full-thickness tear is present to the intra-articular portion of the long head biceps tendon and an intermediate grade partial-thickness bursal surface tear present to the distal supraspinatus tendon. She underwent surgery on 8/08/2024 and she has now been referred to Physical  therapy services for rehab.    Reason for visit: after care rehab for rotator cuff and labral repair left shoulder  Onset time and any changes: 8/08/2024  Pain level and location: 6/10  Radiate proximal or distal: Lateral upper arm  Describe pain: aching, sharp, dull, stiffness, and throbbing  Numbness/tingling: denies  Agg factors: ROM, poor positioning  Ease factors: rest, ice, meds, good positioning  Sleeping position: back  Worse when: all the time  Functional Limitations: limited with ADL's and functional use of the arm due to surgery restrictions  Goals: recover and return to PLOF with complete functional use of arm  Prior therapy/intervention: yes following previous surgery      Falls: none    Imaging, MRI SHOULDER WITHOUT CONTRAST LEFT     CLINICAL HISTORY:  m25.512;  Pain in left shoulder     TECHNIQUE:  Multiplanar multisequence images were performed through the left shoulder.  Contrast was not administered     COMPARISON:  MRI left shoulder used for comparison dated 04/27/2023     FINDINGS:  Calcific tendonitis is present to the distal infraspinatus tendon along with a low-grade partial-thickness tear.  The appearance is similar to what was seen on the prior study.  An intermediate grade partial-thickness bursal surface tear is present to the distal supraspinatus tendon and appears minimally worse on today's study than it did on the prior exam.  Subscapularis and teres minor tendons are intact.     A tear is present to the superior portion of the glenoid labrum.  The long head biceps tendon is not clearly visible in the bicipital groove.  Articular cartilage is intact.  No thickening of the joint capsule.     No os acromial.  Coracoclavicular ligaments are intact.  No atrophy of the rotator cuff muscles.  The included portion of the pectoralis major is intact.     Impression:     A full-thickness tear is present to the intra-articular portion of the long head biceps tendon with retraction distal to the  bicipital groove.  This was not present on the prior study.     A complex tear is present to the superior portion of the glenoid labrum and was not present on the prior study.     Calcific tendonitis is present to the distal infraspinatus tendon.  An intermediate grade partial-thickness bursal surface tear is present to the distal supraspinatus tendon and has worsened minimally since the prior study.  No retraction or associated atrophy is present.     Medical History:   Past Medical History:   Diagnosis Date    Anxiety     Asthma     Cervical spondylosis with myelopathy     Cervical spondylosis with radiculopathy     Chronic shoulder bursitis, left 07/06/2023    Decreased shoulder mobility, left     Degenerative superior labral anterior-to-posterior (SLAP) tear of shoulder 07/06/2023    DM (diabetes mellitus)     Dyslipidemia     Granuloma annulare     History of DVT (deep vein thrombosis) 2015    History of renal cell carcinoma     HTN (hypertension)     IBS (irritable bowel syndrome)     Incomplete tear of left rotator cuff 07/06/2023    Left arm pain     Left arm weakness     Left shoulder pain     Migraine     Neuropathy, arm, left     OA (osteoarthritis)     Rotator cuff tear, left 08/08/2024    Type 2 diabetes mellitus without complications        Surgical History:   Dayana Mejia  has a past surgical history that includes Left C5-6, C6-7 foraminotomies (06/20/2019); Laparoscopic nephrectomy (Left, 05/05/2017); Breast mass excision (Left, 2012); Cholecystectomy (1987); Hernia repair (1987); Colonoscopy; Anterior cervical discectomy w/ fusion (N/A, 08/02/2022); Arthroscopic repair of rotator cuff of shoulder (Left, 07/06/2023); Arthroscopic debridement of rotator cuff (Left, 07/06/2023); Excision of bursa (Left, 07/06/2023); Colonoscopy (N/A, 11/17/2023); Arthroscopic repair of rotator cuff of shoulder (Left, 8/8/2024); and Repair of labrum of hip (Left, 8/8/2024).    Medications:   Dayana has a current medication  list which includes the following prescription(s): albuterol, amitiza, ascorbic acid (vitamin c), aspirin, azelastine, biotin, calcium/magnesium/vit d3, cetirizine, gabapentin, hydrocodone-acetaminophen, ketorolac, metformin, omeprazole, raloxifene, rosuvastatin, spironolactone, symbicort, tamsulosin, temazepam, valsartan, and vascepa, and the following Facility-Administered Medications: 0.9% nacl, fentanyl, lactated ringers, and sodium chloride 0.9%.    Allergies:   Review of patient's allergies indicates:   Allergen Reactions    Codeine Nausea And Vomiting     Other reaction(s): Vomiting (disorder)            Outcome Measure:  Therapist reviewed FOTO scores for Dayana Mejia on 8/27/2024. FOTO documents entered into EPIC - see Media section.  FOTO filled out by: Patient  Patient's Physical FS Primary Measure: 6  Risk Adjusted Statistical FOTO: 26  Limitation Score: 94%%  Category: Carrying  DASH: 92.5% disability (higher score greater disability)    OBJECTIVE       Posture     Left arm in shoulder brace; Rounded shoulders bilaterally (mild/moderate)      Palpation        Left - moderate tenderness to surgery sites, mild palpatory pressure anterior, lateral, and posterior shoulder (coracoid process, pec major and minor, greater and lesser tuberosity, bicep groove, rhomboids, teres major)         AROM      deferred secondary to surgery precautions   PROM        LUE shoulder flexion ~ 90 degrees, abduction ~ 85 degrees; ER ~ 25 degrees      MMT      deferred secondary to surgery precautions         Special Tests      deferred secondary to surgery precautions               TREATMENT     Dayana received the treatments listed below:       Time Activities   Manual 20 Gentle PROM of the left shoulder, PROM left biceps   TherAct     TherEx 30 HEP; pendulum, cervical AROM, chin tucks,scapular retractions, shoulder rolls, shoulder shrugs, active  ball squeeze, active wrist flex/ext, active elbow supination/pronation   Gait      Neuro Re-ed     Modalities 10 CP post exercise   E-Stim     Dry Needling     Canalith Repositioning         Home Exercises and Patient Education Provided    Education provided:   -Plan of care, HEP, protocol and orthopedic/surgery restrictions     Written Home Exercises Provided: yes.  Exercises were reviewed and Trupti was able to demonstrate them prior to the end of the session. Trupti demonstrated good  understanding of the education provided.    See EMR under Patient Instructions for exercises provided 8/27/2024.    ASSESSMENT     Dayana is a 64 y.o. female referred to outpatient Physical Therapy with a medical diagnosis of Rotator cuff tear Supraspinatus, SLAP lesion with Labral repair.  Patient presents with pain, decreased ROM with surgical restrictions, decreased strength and functional reach and use of the right arm all resulting in decreased functional capacity. Patient will benefit from skilled outpatient Physical Therapy to address the deficits stated above and in the chart below, provide education, and to maximize patient's level of independence.    Patient prognosis is Good.     Plan of care discussed with patient: Yes  Patient's spiritual, cultural and educational needs considered and patient is agreeable to the plan of care and goals as stated below:    Anticipated Barriers for therapy: none    Goals:  Short Term Goals: 6 weeks   Patient will report at least 10% increase in functional capacity from initial QuickDash score to indicate clinically significant functional improvement  Patient will report at least 10 point increase on initial FOTO score to indicate clinically significant functional improvement  Patient will demonstrate passive right shoulder flexion, scaption, abduction to at least 130 degrees to improve functional mobility during ADLs  Patient will demonstrate passive right shoulder external rotation to at least 45 degrees to improve functional mobility during ADLs       Long Term  Goals: 12 weeks   Patient will report at least 20% increase in functional capacity from initial QuickDash score to indicate clinically significant functional improvement  Patient will report at least 20 point increase on initial FOTO score to indicate clinically significant functional improvement  Patient will demonstrate active right shoulder flexion, scaption, abduction to at least 165 degrees to improve functional mobility during ADLs  Patient will demonstrate active right shoulder external rotation and internal rotation to 70 degrees and 90 degrees respectively  Patient will demonstrate ability to return to light gardening and report 2/10 pain or less         PLAN   Plan of care Certification: 8/27/2024 to 11/19/2024.    Outpatient Physical Therapy 3 times weekly for 12 weeks to include the following interventions: Manual Therapy, Moist Heat/ Ice, Neuromuscular Re-ed, Patient Education, Self Care, Therapeutic Activities, and Therapeutic Exercise.     Regulo Hernandez, PT

## 2024-08-28 NOTE — PLAN OF CARE
[facility]   Physical Therapy Initial Evaluation     Date: 8/27/2024   Name: Dayana Mejia  Clinic Number: [id number]    Therapy Diagnosis:   Encounter Diagnoses   Name Primary?    S/P left rotator cuff repair Yes    Status post labral repair of shoulder      Physician: Tavo Villalta MD    Physician Orders: PT Eval and Treat  Medical Diagnosis from Referral: Rotator cuff tear Supraspinatus, SLAP lesion  Evaluation Date: 8/27/2024  Authorization Period Expiration: medically necessary  Plan of Care Expiration: 11/19/2024  Visit # / Visits authorized: medically necessary    Time In: 1110  Time Out: 1230  Total Appointment Time (timed & untimed codes): 80 minutes  Total Treatment time (time-based codes) separate from Evaluation: 50 minutes    Surgery: Rotator cuff repair, Labral repair left shoulder  Orthopedic Precautions: Standard rotator cuff precautions; Labral precautions  Pertinent History: see medical chart    SUBJECTIVE     Date of onset: 8/08/2024    History of current condition - Dayana reports: Dayana reports: She went througha series of physical therapy for her left shoulder pain and did well. She continued with an independent HEP. She was doing very well, gradually progressing weight with all activities until one specific incident when she went to lift 8# for an exercise and felt a twinge/pain in her left shoulder. Pain lingered for 2 weeks with gradually worsening intensity. She sought medical care at surgeon who did her surgery and they requested an MRI. MRI denied, X-ray done instead but nothing acute showed. She was then sent to PT but did not respond this time. She eventually got an MRI which revealed full-thickness tear is present to the intra-articular portion of the long head biceps tendon and an intermediate grade partial-thickness bursal surface tear present to the distal supraspinatus tendon. She underwent surgery on 8/08/2024 and she has now been referred to Physical  therapy services for rehab.    Reason for visit: after care rehab for rotator cuff and labral repair left shoulder  Onset time and any changes: 8/08/2024  Pain level and location: 6/10  Radiate proximal or distal: Lateral upper arm  Describe pain: aching, sharp, dull, stiffness, and throbbing  Numbness/tingling: denies  Agg factors: ROM, poor positioning  Ease factors: rest, ice, meds, good positioning  Sleeping position: back  Worse when: all the time  Functional Limitations: limited with ADL's and functional use of the arm due to surgery restrictions  Goals: recover and return to PLOF with complete functional use of arm  Prior therapy/intervention: yes following previous surgery      Falls: none    Imaging, MRI SHOULDER WITHOUT CONTRAST LEFT     CLINICAL HISTORY:  m25.512;  Pain in left shoulder     TECHNIQUE:  Multiplanar multisequence images were performed through the left shoulder.  Contrast was not administered     COMPARISON:  MRI left shoulder used for comparison dated 04/27/2023     FINDINGS:  Calcific tendonitis is present to the distal infraspinatus tendon along with a low-grade partial-thickness tear.  The appearance is similar to what was seen on the prior study.  An intermediate grade partial-thickness bursal surface tear is present to the distal supraspinatus tendon and appears minimally worse on today's study than it did on the prior exam.  Subscapularis and teres minor tendons are intact.     A tear is present to the superior portion of the glenoid labrum.  The long head biceps tendon is not clearly visible in the bicipital groove.  Articular cartilage is intact.  No thickening of the joint capsule.     No os acromial.  Coracoclavicular ligaments are intact.  No atrophy of the rotator cuff muscles.  The included portion of the pectoralis major is intact.     Impression:     A full-thickness tear is present to the intra-articular portion of the long head biceps tendon with retraction distal to the  bicipital groove.  This was not present on the prior study.     A complex tear is present to the superior portion of the glenoid labrum and was not present on the prior study.     Calcific tendonitis is present to the distal infraspinatus tendon.  An intermediate grade partial-thickness bursal surface tear is present to the distal supraspinatus tendon and has worsened minimally since the prior study.  No retraction or associated atrophy is present.     Medical History:   Past Medical History:   Diagnosis Date    Anxiety     Asthma     Cervical spondylosis with myelopathy     Cervical spondylosis with radiculopathy     Chronic shoulder bursitis, left 07/06/2023    Decreased shoulder mobility, left     Degenerative superior labral anterior-to-posterior (SLAP) tear of shoulder 07/06/2023    DM (diabetes mellitus)     Dyslipidemia     Granuloma annulare     History of DVT (deep vein thrombosis) 2015    History of renal cell carcinoma     HTN (hypertension)     IBS (irritable bowel syndrome)     Incomplete tear of left rotator cuff 07/06/2023    Left arm pain     Left arm weakness     Left shoulder pain     Migraine     Neuropathy, arm, left     OA (osteoarthritis)     Rotator cuff tear, left 08/08/2024    Type 2 diabetes mellitus without complications        Surgical History:   Dayana Mejia  has a past surgical history that includes Left C5-6, C6-7 foraminotomies (06/20/2019); Laparoscopic nephrectomy (Left, 05/05/2017); Breast mass excision (Left, 2012); Cholecystectomy (1987); Hernia repair (1987); Colonoscopy; Anterior cervical discectomy w/ fusion (N/A, 08/02/2022); Arthroscopic repair of rotator cuff of shoulder (Left, 07/06/2023); Arthroscopic debridement of rotator cuff (Left, 07/06/2023); Excision of bursa (Left, 07/06/2023); Colonoscopy (N/A, 11/17/2023); Arthroscopic repair of rotator cuff of shoulder (Left, 8/8/2024); and Repair of labrum of hip (Left, 8/8/2024).    Medications:   Dayana has a current medication  list which includes the following prescription(s): albuterol, amitiza, ascorbic acid (vitamin c), aspirin, azelastine, biotin, calcium/magnesium/vit d3, cetirizine, gabapentin, hydrocodone-acetaminophen, ketorolac, metformin, omeprazole, raloxifene, rosuvastatin, spironolactone, symbicort, tamsulosin, temazepam, valsartan, and vascepa, and the following Facility-Administered Medications: 0.9% nacl, fentanyl, lactated ringers, and sodium chloride 0.9%.    Allergies:   Review of patient's allergies indicates:   Allergen Reactions    Codeine Nausea And Vomiting     Other reaction(s): Vomiting (disorder)            Outcome Measure:  Therapist reviewed FOTO scores for Dayana Mejia on 8/27/2024. FOTO documents entered into EPIC - see Media section.  FOTO filled out by: Patient  Patient's Physical FS Primary Measure: 6  Risk Adjusted Statistical FOTO: 26  Limitation Score: 94%%  Category: Carrying  DASH: 92.5% disability (higher score greater disability)    OBJECTIVE       Posture     Left arm in shoulder brace; Rounded shoulders bilaterally (mild/moderate)      Palpation        Left - moderate tenderness to surgery sites, mild palpatory pressure anterior, lateral, and posterior shoulder (coracoid process, pec major and minor, greater and lesser tuberosity, bicep groove, rhomboids, teres major)         AROM      deferred secondary to surgery precautions   PROM        LUE shoulder flexion ~ 90 degrees, abduction ~ 85 degrees; ER ~ 25 degrees      MMT      deferred secondary to surgery precautions         Special Tests      deferred secondary to surgery precautions               TREATMENT     Dayana received the treatments listed below:       Time Activities   Manual 20 Gentle PROM of the left shoulder, PROM left biceps   TherAct     TherEx 30 HEP; pendulum, cervical AROM, chin tucks,scapular retractions, shoulder rolls, shoulder shrugs, active  ball squeeze, active wrist flex/ext, active elbow supination/pronation   Gait      Neuro Re-ed     Modalities 10 CP post exercise   E-Stim     Dry Needling     Canalith Repositioning         Home Exercises and Patient Education Provided    Education provided:   -Plan of care, HEP, protocol and orthopedic/surgery restrictions     Written Home Exercises Provided: yes.  Exercises were reviewed and Trupti was able to demonstrate them prior to the end of the session. Trupti demonstrated good  understanding of the education provided.    See EMR under Patient Instructions for exercises provided 8/27/2024.    ASSESSMENT     Dayana is a 64 y.o. female referred to outpatient Physical Therapy with a medical diagnosis of Rotator cuff tear Supraspinatus, SLAP lesion with Labral repair.  Patient presents with pain, decreased ROM with surgical restrictions, decreased strength and functional reach and use of the right arm all resulting in decreased functional capacity. Patient will benefit from skilled outpatient Physical Therapy to address the deficits stated above and in the chart below, provide education, and to maximize patient's level of independence.    Patient prognosis is Good.     Plan of care discussed with patient: Yes  Patient's spiritual, cultural and educational needs considered and patient is agreeable to the plan of care and goals as stated below:    Anticipated Barriers for therapy: none    Goals:  Short Term Goals: 6 weeks   Patient will report at least 10% increase in functional capacity from initial QuickDash score to indicate clinically significant functional improvement  Patient will report at least 10 point increase on initial FOTO score to indicate clinically significant functional improvement  Patient will demonstrate passive right shoulder flexion, scaption, abduction to at least 130 degrees to improve functional mobility during ADLs  Patient will demonstrate passive right shoulder external rotation to at least 45 degrees to improve functional mobility during ADLs       Long Term  Goals: 12 weeks   Patient will report at least 20% increase in functional capacity from initial QuickDash score to indicate clinically significant functional improvement  Patient will report at least 20 point increase on initial FOTO score to indicate clinically significant functional improvement  Patient will demonstrate active right shoulder flexion, scaption, abduction to at least 165 degrees to improve functional mobility during ADLs  Patient will demonstrate active right shoulder external rotation and internal rotation to 70 degrees and 90 degrees respectively  Patient will demonstrate ability to return to light gardening and report 2/10 pain or less     PLAN   Plan of care Certification: 8/27/2024 to 11/19/2024.    Outpatient Physical Therapy 3 times weekly for 12 weeks to include the following interventions: Manual Therapy, Moist Heat/ Ice, Neuromuscular Re-ed, Patient Education, Self Care, Therapeutic Activities, and Therapeutic Exercise.     Regulo Hernandez, PT

## 2024-08-29 ENCOUNTER — CLINICAL SUPPORT (OUTPATIENT)
Dept: REHABILITATION | Facility: HOSPITAL | Age: 64
End: 2024-08-29
Payer: COMMERCIAL

## 2024-08-29 DIAGNOSIS — Z98.890 STATUS POST LABRAL REPAIR OF SHOULDER: ICD-10-CM

## 2024-08-29 DIAGNOSIS — Z98.890 S/P LEFT ROTATOR CUFF REPAIR: Primary | ICD-10-CM

## 2024-08-29 PROCEDURE — 97110 THERAPEUTIC EXERCISES: CPT

## 2024-08-29 PROCEDURE — 97140 MANUAL THERAPY 1/> REGIONS: CPT

## 2024-08-29 NOTE — PROGRESS NOTES
Physical Therapy Treatment Note     Name: Dayana Mejia  Clinic Number: [id number]    Therapy Diagnosis: Aftercare Rotator Cuff Repair  Physician: Order, Paper    Visit Date: 8/29/2024    Physician Orders: PT Eval and Treat  Medical Diagnosis from Referral: Rotator cuff tear Supraspinatus, SLAP lesion  Evaluation Date: 8/27/2024  Authorization Period Expiration: medically necessary  Plan of Care Expiration: 11/19/2024  Visit # / Visits authorized: visit #1    Time In: 1106  Time Out: 1216  Total Billable Time: 50 minutes    Surgery: Rotator cuff repair, Labral repair left shoulder  Orthopedic Precautions: Standard rotator cuff precautions; Labral precautions  Pertinent History: see medical chart    Subjective     Patient reports: Dayana reports that the arm is sore but overall doing pretty well..    Response to previous treatment: good    Pain: 5/10  Location: left shoulder      Outcome Measure:  Therapist reviewed FOTO scores for Dayana Mejia on 8/27/2024. FOTO documents entered into [employer] - see Media section.  FOTO filled out by: Patient    Intake: Patient's Physical FS Primary Measure: 6  Intake: Risk Adjusted Statistical FOTO: 26  Intake: Limitation Score: 94%%  Intake: Category: Carrying  Intake: DASH: 92.5% disability (higher score greater disability)    Objective     Dayana received the following treatment:     Time Activities   Manual 20 Gentle PROM of the left shoulder, PROM left biceps    TherAct     TherEx 30 pendulum, cervical AROM, chin tucks,scapular retractions, shoulder rolls, shoulder shrugs, active  ball squeeze, active wrist flex/ext, active elbow supination/pronation    Gait     Neuro Re-ed     Modalities 10/10 Moist heat pre exercise; CP post exercise   E-Stim     Dry Needling     Canalith Repositioning           Home Exercises Provided and Patient Education Provided     Education provided:   -Plan of care, HEP, protocol and orthopedic/surgery restrictions     Written Home Exercises Provided:  yes.  Exercises were reviewed and Trupti was able to demonstrate them prior to the end of the session. Trupti demonstrated good  understanding of the education provided.     See EMR under Patient Instructions for exercises provided 8/27/2024.    Assessment     Dayana is a 64 y.o. female referred to outpatient Physical Therapy with a medical diagnosis of Rotator cuff tear Supraspinatus, SLAP lesion with Labral repair. Patient presents with pain, decreased ROM with surgical restrictions, decreased strength and functional reach and use of the right arm all resulting in decreased functional capacity.     Dayana received PROM exercises to the left shoulder and she tolerated it well. Included scapula stabilization and cervical ROM exercises. Goals are to decrease inflammation, decrease pain, postural education and PROM as instructed. Did not instruct for isometrics at home just yet. Want to see how she responded doing them here. Will continue the plan of care.     Patient prognosis is Good.      Anticipated barriers to physical therapy: none    Goals: Dayana Is progressing well towards her goals.    Short Term Goals: 6 weeks   Patient will report at least 10% increase in functional capacity from initial QuickDash score to indicate clinically significant functional improvement  Patient will report at least 10 point increase on initial FOTO score to indicate clinically significant functional improvement  Patient will demonstrate passive right shoulder flexion, scaption, abduction to at least 130 degrees to improve functional mobility during ADLs  Patient will demonstrate passive right shoulder external rotation to at least 45 degrees to improve functional mobility during ADLs       Long Term Goals: 12 weeks   Patient will report at least 20% increase in functional capacity from initial QuickDash score to indicate clinically significant functional improvement  Patient will report at least 20 point increase on initial FOTO  score to indicate clinically significant functional improvement  Patient will demonstrate active right shoulder flexion, scaption, abduction to at least 165 degrees to improve functional mobility during ADLs  Patient will demonstrate active right shoulder external rotation and internal rotation to 70 degrees and 90 degrees respectively  Patient will demonstrate ability to return to light gardening and report 2/10 pain or less    Plan     Plan of care Certification: 8/27/2024 to 11/19/2024.     Outpatient Physical Therapy 3 times weekly for 12 weeks to include the following interventions: Manual Therapy, Moist Heat/ Ice, Neuromuscular Re-ed, Patient Education, Self Care, Therapeutic Activities, and Therapeutic Exercise.     Regulo Hernandez, PT

## 2024-08-30 ENCOUNTER — CLINICAL SUPPORT (OUTPATIENT)
Dept: REHABILITATION | Facility: HOSPITAL | Age: 64
End: 2024-08-30
Payer: COMMERCIAL

## 2024-08-30 DIAGNOSIS — Z98.890 S/P LEFT ROTATOR CUFF REPAIR: Primary | ICD-10-CM

## 2024-08-30 PROCEDURE — 97110 THERAPEUTIC EXERCISES: CPT

## 2024-08-30 PROCEDURE — 97140 MANUAL THERAPY 1/> REGIONS: CPT

## 2024-08-30 PROCEDURE — 97010 HOT OR COLD PACKS THERAPY: CPT

## 2024-08-30 NOTE — PROGRESS NOTES
Physical Therapy Treatment Note     Name: Dayana Mejia  Clinic Number: [id number]    Therapy Diagnosis: Aftercare Rotator Cuff Repair  Physician: Order, Paper    Visit Date: 8/30/2024    Physician Orders: PT Eval and Treat  Medical Diagnosis from Referral: Rotator cuff tear Supraspinatus, SLAP lesion  Evaluation Date: 8/27/2024  Authorization Period Expiration: medically necessary  Plan of Care Expiration: 11/19/2024  Visit # / Visits authorized: visit #2    Time In: 1110  Time Out: 1200  Total Billable Time: 50 minutes    Surgery: Rotator cuff repair, Labral repair left shoulder on 8/8/2024  Orthopedic Precautions: Standard rotator cuff precautions; Labral precautions  Pertinent History: see medical chart    Subjective     Patient reports: that her arm remains sore, however she has not been taking any pain meds during the day. States she is rationing it as she has to drive to [facility] to  prescriptions. After discussion, agrees to take some prior to therapy session.     Response to previous treatment: good    Pain: 5/10  Location: left shoulder      Outcome Measure:  Therapist reviewed FOTO scores for Dayana Mejia on 8/27/2024. FOTO documents entered into EPIC - see Media section.  FOTO filled out by: Patient    Intake: Patient's Physical FS Primary Measure: 6  Intake: Risk Adjusted Statistical FOTO: 26  Intake: Limitation Score: 94%%  Intake: Category: Carrying  Intake: DASH: 92.5% disability (higher score greater disability)    Objective     Dayana received the following treatment:     Time Activities   Manual 20 Gentle PROM of the left shoulder, PROM left biceps    TherAct     TherEx 20 pendulum, cervical AROM, chin tucks,scapular retractions, shoulder rolls, shoulder shrugs, active  ball squeeze, active wrist flex/ext, active elbow supination/pronation    Gait     Neuro Re-ed     Modalities 10/10 Moist heat pre exercise; CP post exercise   E-Stim     Dry Needling     Canalith Repositioning       Home  Exercises Provided and Patient Education Provided     Education provided:   -Plan of care, HEP, protocol and orthopedic/surgery restrictions     Written Home Exercises Provided: yes.  Exercises were reviewed and Trupti was able to demonstrate them prior to the end of the session. Trupti demonstrated good  understanding of the education provided.     See EMR under Patient Instructions for exercises provided 8/27/2024.    Assessment     Dayana is a 64 y.o. female referred to outpatient Physical Therapy with a medical diagnosis of Rotator cuff tear Supraspinatus, SLAP lesion with Labral repair. Patient presents with pain, decreased ROM with surgical restrictions, decreased strength and functional reach and use of the right arm all resulting in decreased functional capacity.     Dayana received PROM exercises to the left shoulder and she tolerated it well. Continue to include scapula stabilization and cervical ROM exercises. Goals are to decrease inflammation, decrease pain, postural education and PROM as instructed. Encouraged ongoing wrist/forearm exercises at home. Will continue the plan of care.     Patient prognosis is Good.      Anticipated barriers to physical therapy: none    Goals: Dayana Is progressing well towards her goals.    Short Term Goals: 6 weeks   Patient will report at least 10% increase in functional capacity from initial QuickDash score to indicate clinically significant functional improvement  Patient will report at least 10 point increase on initial FOTO score to indicate clinically significant functional improvement  Patient will demonstrate passive right shoulder flexion, scaption, abduction to at least 130 degrees to improve functional mobility during ADLs  Patient will demonstrate passive right shoulder external rotation to at least 45 degrees to improve functional mobility during ADLs       Long Term Goals: 12 weeks   Patient will report at least 20% increase in functional capacity from  initial QuickDash score to indicate clinically significant functional improvement  Patient will report at least 20 point increase on initial FOTO score to indicate clinically significant functional improvement  Patient will demonstrate active right shoulder flexion, scaption, abduction to at least 165 degrees to improve functional mobility during ADLs  Patient will demonstrate active right shoulder external rotation and internal rotation to 70 degrees and 90 degrees respectively  Patient will demonstrate ability to return to light gardening and report 2/10 pain or less    Plan     Plan of care Certification: 8/27/2024 to 11/19/2024.     Outpatient Physical Therapy 3 times weekly for 12 weeks to include the following interventions: Manual Therapy, Moist Heat/ Ice, Neuromuscular Re-ed, Patient Education, Self Care, Therapeutic Activities, and Therapeutic Exercise.     Bear Cline, PT

## 2024-09-03 ENCOUNTER — CLINICAL SUPPORT (OUTPATIENT)
Dept: REHABILITATION | Facility: HOSPITAL | Age: 64
End: 2024-09-03
Payer: COMMERCIAL

## 2024-09-03 DIAGNOSIS — Z98.890 S/P LEFT ROTATOR CUFF REPAIR: Primary | ICD-10-CM

## 2024-09-03 PROCEDURE — 97110 THERAPEUTIC EXERCISES: CPT

## 2024-09-03 PROCEDURE — 97010 HOT OR COLD PACKS THERAPY: CPT

## 2024-09-03 PROCEDURE — 97140 MANUAL THERAPY 1/> REGIONS: CPT

## 2024-09-03 NOTE — PROGRESS NOTES
Physical Therapy Treatment Note     Name: Dayana Mejia  Clinic Number: [id number]    Therapy Diagnosis: Aftercare Rotator Cuff Repair  Physician: Tavo Villalta MD    Visit Date: 9/3/2024    Physician Orders: PT Eval and Treat  Medical Diagnosis from Referral: Rotator cuff tear Supraspinatus, SLAP lesion  Evaluation Date: 8/27/2024  Authorization Period Expiration: medically necessary  Plan of Care Expiration: 11/19/2024  Visit # / Visits authorized: visit #3    Time In: 1100  Time Out: 1150  Total Billable Time: 50 minutes    Surgery: Rotator cuff repair, Labral repair left shoulder on 8/8/2024  Orthopedic Precautions: Standard rotator cuff precautions; Labral precautions  Pertinent History: see medical chart    Subjective     Patient reports: that her arm feels better right this moment because she took pain meds before coming today. Says she decided to get another script from the MD if she would begin using the pain meds.      Response to previous treatment: good    Pain: 2/10  Location: left shoulder      Outcome Measure:  Therapist reviewed FOTO scores for Dayana Mejia on 8/27/2024. FOTO documents entered into EPIC - see Media section.  FOTO filled out by: Patient    Intake: Patient's Physical FS Primary Measure: 6  Intake: Risk Adjusted Statistical FOTO: 26  Intake: Limitation Score: 94%%  Intake: Category: Carrying  Intake: DASH: 92.5% disability (higher score greater disability)    Objective     Dayana received the following treatment:     Time Activities   Manual 10 Gentle PROM of the left shoulder, PROM left biceps    TherAct     TherEx 20 pendulum, cervical AROM, chin tucks,scapular retractions, shoulder rolls, shoulder shrugs, active  ball squeeze, active wrist flex/ext, active elbow supination/pronation    Gait     Neuro Re-ed     Modalities 10/10 Moist heat pre exercise; CP post exercise   E-Stim     Dry Needling     Canalith Repositioning       Home Exercises Provided and Patient Education Provided      Education provided:   -Plan of care, HEP, protocol and orthopedic/surgery restrictions     Written Home Exercises Provided: yes.  Exercises were reviewed and Trupti was able to demonstrate them prior to the end of the session. Turpti demonstrated good  understanding of the education provided.     See EMR under Patient Instructions for exercises provided 8/27/2024.    Assessment     Dayana is a 64 y.o. female referred to outpatient Physical Therapy with a medical diagnosis of Rotator cuff tear Supraspinatus, SLAP lesion with Labral repair. Patient presents with pain, decreased ROM with surgical restrictions, decreased strength and functional reach and use of the right arm all resulting in decreased functional capacity.     Dayana received PROM exercises to the left shoulder. Was able to achieve more PROM today than prior session with pain meds on board. Still painful at end range. Continue to include scapula stabilization and cervical ROM exercises. Goals are to decrease inflammation, decrease pain, postural education and PROM as instructed. Encouraged ongoing wrist/forearm exercises at home. Will continue the plan of care.     Patient prognosis is Good.      Anticipated barriers to physical therapy: none    Goals: Dayana Is progressing well towards her goals.    Short Term Goals: 6 weeks   Patient will report at least 10% increase in functional capacity from initial QuickDash score to indicate clinically significant functional improvement  Patient will report at least 10 point increase on initial FOTO score to indicate clinically significant functional improvement  Patient will demonstrate passive right shoulder flexion, scaption, abduction to at least 130 degrees to improve functional mobility during ADLs  Patient will demonstrate passive right shoulder external rotation to at least 45 degrees to improve functional mobility during ADLs       Long Term Goals: 12 weeks   Patient will report at least 20% increase in  functional capacity from initial QuickDash score to indicate clinically significant functional improvement  Patient will report at least 20 point increase on initial FOTO score to indicate clinically significant functional improvement  Patient will demonstrate active right shoulder flexion, scaption, abduction to at least 165 degrees to improve functional mobility during ADLs  Patient will demonstrate active right shoulder external rotation and internal rotation to 70 degrees and 90 degrees respectively  Patient will demonstrate ability to return to light gardening and report 2/10 pain or less    Plan     Plan of care Certification: 8/27/2024 to 11/19/2024.     Outpatient Physical Therapy 3 times weekly for 12 weeks to include the following interventions: Manual Therapy, Moist Heat/ Ice, Neuromuscular Re-ed, Patient Education, Self Care, Therapeutic Activities, and Therapeutic Exercise.     Bear Cline, PT

## 2024-09-05 ENCOUNTER — CLINICAL SUPPORT (OUTPATIENT)
Dept: REHABILITATION | Facility: HOSPITAL | Age: 64
End: 2024-09-05
Payer: COMMERCIAL

## 2024-09-05 DIAGNOSIS — Z98.890 S/P LEFT ROTATOR CUFF REPAIR: Primary | ICD-10-CM

## 2024-09-05 PROCEDURE — 97140 MANUAL THERAPY 1/> REGIONS: CPT

## 2024-09-05 PROCEDURE — 97110 THERAPEUTIC EXERCISES: CPT

## 2024-09-05 PROCEDURE — 97010 HOT OR COLD PACKS THERAPY: CPT

## 2024-09-05 NOTE — PROGRESS NOTES
"  Physical Therapy Treatment Note     Name: Dayana Mejia  Clinic Number: [id number]    Therapy Diagnosis: Aftercare Rotator Cuff Repair  Physician: Tavo Villalta MD    Visit Date: 9/5/2024    Physician Orders: PT Eval and Treat  Medical Diagnosis from Referral: Rotator cuff tear Supraspinatus, SLAP lesion  Evaluation Date: 8/27/2024  Authorization Period Expiration: medically necessary  Plan of Care Expiration: 11/19/2024  Visit # / Visits authorized: visit #4    Time In: 1100  Time Out: 1150  Total Billable Time: 50 minutes    Surgery: Rotator cuff repair, Labral repair left shoulder on 8/8/2024  Orthopedic Precautions: Standard rotator cuff precautions; Labral precautions  Pertinent History: see medical chart    Subjective     Patient reports: that she had some soreness following last session but took her pain meds again prior to coming for therapy. Applied some ointment called "Heat" to shoulder - daughter brought back from [place], which works better than Biofreeze. Reports her neck pain is reducing with the neck exercises she is doing.       Response to previous treatment: good    Pain: 2/10  Location: left shoulder      Outcome Measure:  Therapist reviewed FOTO scores for Dayana Mejia on 8/27/2024. FOTO documents entered into [employer] - see Media section.  FOTO filled out by: Patient    Intake: Patient's Physical FS Primary Measure: 6  Intake: Risk Adjusted Statistical FOTO: 26  Intake: Limitation Score: 94%%  Intake: Category: Carrying  Intake: DASH: 92.5% disability (higher score greater disability)    Objective     Dayana received the following treatment:     Time Activities   Manual 10 Gentle PROM of the left shoulder, PROM left biceps    TherAct     TherEx 20 pendulum, cervical AROM, chin tucks,scapular retractions, shoulder rolls, shoulder shrugs, active  ball squeeze, active wrist flex/ext, active elbow supination/pronation    Gait     Neuro Re-ed     Modalities 10/10 Moist heat pre exercise; CP post " exercise   E-Stim     Dry Needling     Canalith Repositioning       Home Exercises Provided and Patient Education Provided     Education provided:   -Plan of care, HEP, protocol and orthopedic/surgery restrictions     Written Home Exercises Provided: yes.  Exercises were reviewed and Trupti was able to demonstrate them prior to the end of the session. Trupti demonstrated good  understanding of the education provided.     See EMR under Patient Instructions for exercises provided 8/27/2024.    Assessment     Dayana is a 64 y.o. female referred to outpatient Physical Therapy with a medical diagnosis of Rotator cuff tear Supraspinatus, SLAP lesion with Labral repair. Patient presents with pain, decreased ROM with surgical restrictions, decreased strength and functional reach and use of the right arm all resulting in decreased functional capacity.     Dayana received PROM exercises to the left shoulder. Slowly able to achieve more PROM today again with pain meds on board. Still painful at end range. Continue to include scapula stabilization and cervical ROM exercises. Goals are to decrease inflammation, decrease pain, postural education and PROM as instructed. Encouraged ongoing wrist/forearm exercises at home. Will continue the plan of care.     Patient prognosis is Good.      Anticipated barriers to physical therapy: none    Goals: Dayana Is progressing well towards her goals.    Short Term Goals: 6 weeks   Patient will report at least 10% increase in functional capacity from initial QuickDash score to indicate clinically significant functional improvement  Patient will report at least 10 point increase on initial FOTO score to indicate clinically significant functional improvement  Patient will demonstrate passive right shoulder flexion, scaption, abduction to at least 130 degrees to improve functional mobility during ADLs  Patient will demonstrate passive right shoulder external rotation to at least 45 degrees to  improve functional mobility during ADLs       Long Term Goals: 12 weeks   Patient will report at least 20% increase in functional capacity from initial QuickDash score to indicate clinically significant functional improvement  Patient will report at least 20 point increase on initial FOTO score to indicate clinically significant functional improvement  Patient will demonstrate active right shoulder flexion, scaption, abduction to at least 165 degrees to improve functional mobility during ADLs  Patient will demonstrate active right shoulder external rotation and internal rotation to 70 degrees and 90 degrees respectively  Patient will demonstrate ability to return to light gardening and report 2/10 pain or less    Plan     Plan of care Certification: 8/27/2024 to 11/19/2024.     Outpatient Physical Therapy 3 times weekly for 12 weeks to include the following interventions: Manual Therapy, Moist Heat/ Ice, Neuromuscular Re-ed, Patient Education, Self Care, Therapeutic Activities, and Therapeutic Exercise.     Bear Cline, PT

## 2024-09-06 ENCOUNTER — CLINICAL SUPPORT (OUTPATIENT)
Dept: REHABILITATION | Facility: HOSPITAL | Age: 64
End: 2024-09-06
Payer: COMMERCIAL

## 2024-09-06 DIAGNOSIS — Z98.890 S/P LEFT ROTATOR CUFF REPAIR: Primary | ICD-10-CM

## 2024-09-06 PROCEDURE — 97010 HOT OR COLD PACKS THERAPY: CPT

## 2024-09-06 PROCEDURE — 97110 THERAPEUTIC EXERCISES: CPT

## 2024-09-06 PROCEDURE — 97140 MANUAL THERAPY 1/> REGIONS: CPT

## 2024-09-06 NOTE — PROGRESS NOTES
Physical Therapy Treatment Note     Name: Dayana Mejia  Clinic Number: [id number]    Therapy Diagnosis: Aftercare Rotator Cuff Repair  Physician: Tavo Villalta MD    Visit Date: 9/6/2024    Physician Orders: PT Eval and Treat  Medical Diagnosis from Referral: Rotator cuff tear Supraspinatus, SLAP lesion  Evaluation Date: 8/27/2024  Authorization Period Expiration: medically necessary  Plan of Care Expiration: 11/19/2024  Visit # / Visits authorized: visit #5    Time In: 1058  Time Out: 1150  Total Billable Time: 52 minutes    Surgery: Rotator cuff repair, Labral repair left shoulder on 8/8/2024  Orthopedic Precautions: Standard rotator cuff precautions; Labral precautions  Pertinent History: see medical chart    Subjective     Patient reports: that she is still having soreness, especially at night. Took her pain meds again prior to coming for therapy again today. Reports she has a follow up visit on 9/16/24 with Dr. Villalta.        Response to previous treatment: good    Pain: 2/10  Location: left shoulder      Outcome Measure:  Therapist reviewed FOTO scores for Dayana Mejia on 8/27/2024. FOTO documents entered into EPIC - see Media section.  FOTO filled out by: Patient    Intake: Patient's Physical FS Primary Measure: 6  Intake: Risk Adjusted Statistical FOTO: 26  Intake: Limitation Score: 94%%  Intake: Category: Carrying  Intake: DASH: 92.5% disability (higher score greater disability)    Objective     Dayana received the following treatment:     Time Activities   Manual 10 Gentle PROM of the left shoulder, PROM left biceps    TherAct     TherEx 22 pendulum, cervical AROM, chin tucks,scapular retractions, shoulder rolls, shoulder shrugs, active  ball squeeze, active wrist flex/ext, active elbow supination/pronation    Gait     Neuro Re-ed     Modalities 10/10 Moist heat pre exercise; CP post exercise   E-Stim     Dry Needling     Canalith Repositioning       Home Exercises Provided and Patient Education  Provided     Education provided:   -Plan of care, HEP, protocol and orthopedic/surgery restrictions     Written Home Exercises Provided: yes.  Exercises were reviewed and Trupti was able to demonstrate them prior to the end of the session. Trupti demonstrated good  understanding of the education provided.     See EMR under Patient Instructions for exercises provided 8/27/2024.    Assessment     Dayana is a 64 y.o. female referred to outpatient Physical Therapy with a medical diagnosis of Rotator cuff tear Supraspinatus, SLAP lesion with Labral repair. Patient presents with pain, decreased ROM with surgical restrictions, decreased strength and functional reach and use of the right arm all resulting in decreased functional capacity.     Dayana received PROM exercises to the left shoulder. Slowly able to achieve more PROM today again with pain meds on board. Still painful at end range. Continue to include scapula stabilization and cervical ROM exercises. Goals are to decrease inflammation, decrease pain, postural education and PROM as instructed. Encouraged ongoing wrist/forearm exercises at home. Will continue the plan of care.     Patient prognosis is Good.      Anticipated barriers to physical therapy: none    Goals: Dayana Is progressing well towards her goals.    Short Term Goals: 6 weeks   Patient will report at least 10% increase in functional capacity from initial QuickDash score to indicate clinically significant functional improvement  Patient will report at least 10 point increase on initial FOTO score to indicate clinically significant functional improvement  Patient will demonstrate passive right shoulder flexion, scaption, abduction to at least 130 degrees to improve functional mobility during ADLs  Patient will demonstrate passive right shoulder external rotation to at least 45 degrees to improve functional mobility during ADLs       Long Term Goals: 12 weeks   Patient will report at least 20% increase in  functional capacity from initial QuickDash score to indicate clinically significant functional improvement  Patient will report at least 20 point increase on initial FOTO score to indicate clinically significant functional improvement  Patient will demonstrate active right shoulder flexion, scaption, abduction to at least 165 degrees to improve functional mobility during ADLs  Patient will demonstrate active right shoulder external rotation and internal rotation to 70 degrees and 90 degrees respectively  Patient will demonstrate ability to return to light gardening and report 2/10 pain or less    Plan     Plan of care Certification: 8/27/2024 to 11/19/2024.     Outpatient Physical Therapy 3 times weekly for 12 weeks to include the following interventions: Manual Therapy, Moist Heat/ Ice, Neuromuscular Re-ed, Patient Education, Self Care, Therapeutic Activities, and Therapeutic Exercise.     Bear Cline, PT

## 2024-09-10 ENCOUNTER — CLINICAL SUPPORT (OUTPATIENT)
Dept: REHABILITATION | Facility: HOSPITAL | Age: 64
End: 2024-09-10
Payer: COMMERCIAL

## 2024-09-10 DIAGNOSIS — Z98.890 STATUS POST LABRAL REPAIR OF SHOULDER: ICD-10-CM

## 2024-09-10 DIAGNOSIS — Z98.890 S/P LEFT ROTATOR CUFF REPAIR: Primary | ICD-10-CM

## 2024-09-10 PROCEDURE — 97140 MANUAL THERAPY 1/> REGIONS: CPT

## 2024-09-10 PROCEDURE — 97112 NEUROMUSCULAR REEDUCATION: CPT

## 2024-09-10 PROCEDURE — 97110 THERAPEUTIC EXERCISES: CPT

## 2024-09-10 NOTE — PROGRESS NOTES
Physical Therapy Treatment Note     Name: Dayana Mejia  Clinic Number: [id number]    Therapy Diagnosis: Aftercare Rotator Cuff Repair  Physician: Tavo Villalta MD    Visit Date: 9/10/2024    Physician Orders: PT Eval and Treat  Medical Diagnosis from Referral: Rotator cuff tear Supraspinatus, SLAP lesion  Evaluation Date: 8/27/2024  Authorization Period Expiration: medically necessary  Plan of Care Expiration: 11/19/2024  Visit # / Visits authorized: visit #6    Time In: 1107  Time Out: 1220  Total Billable Time: 53 minutes    Surgery: Rotator cuff repair, Labral repair left shoulder on 8/8/2024  Orthopedic Precautions: Standard rotator cuff precautions; Labral precautions  Pertinent History: see medical chart    Subjective     Patient reports: she is sore from last session and her pain is always worse at night. Took her pain meds again prior to coming for therapy. Reports she has a follow up visit on 9/16/24 with Dr. Villalta.        Response to previous treatment: good    Pain: 2/10  Location: left shoulder      Outcome Measure:  Therapist reviewed FOTO scores for Dayana Mejia on 8/27/2024. FOTO documents entered into EPIC - see Media section.  FOTO filled out by: Patient    Intake: Patient's Physical FS Primary Measure: 6  Intake: Risk Adjusted Statistical FOTO: 26  Intake: Limitation Score: 94%%  Intake: Category: Carrying  Intake: DASH: 92.5% disability (higher score greater disability)    Objective     Dayana received the following treatment:     Time Activities   Manual 15 Gentle PROM of the left shoulder, gentle mobs   TherAct     TherEx 25 pendulum, chin tucks,scapular retractions, shoulder rolls, shoulder shrugs, active  ball squeeze, active wrist flex/ext, active elbow supination/pronation    Gait     Neuro Re-ed 13 rhythmic stabilization, scapula neuro re-ed   Modalities 10/10 Moist heat pre exercise; CP post exercise   E-Stim     Dry Needling     Canalith Repositioning       Home Exercises Provided  and Patient Education Provided     Education provided:   -Plan of care, HEP, protocol and orthopedic/surgery restrictions     Written Home Exercises Provided: yes.  Exercises were reviewed and Trupti was able to demonstrate them prior to the end of the session. Trupti demonstrated good  understanding of the education provided.     See EMR under Patient Instructions for exercises provided 8/27/2024.    Assessment     Dayana is a 64 y.o. female referred to outpatient Physical Therapy with a medical diagnosis of Rotator cuff tear Supraspinatus, SLAP lesion with Labral repair. Patient presents with pain, decreased ROM with surgical restrictions, decreased strength and functional reach and use of the right arm all resulting in decreased functional capacity.     Dayana received PROM exercises to the left shoulder. Slowly able to achieve more PROM today again with pain meds on board. Still painful at end range. Added rhythmic stabilization and scapula neuro re-ed exercises and she tolerated it well. Goals are to decrease inflammation, decrease pain, postural education and PROM as instructed. Encouraged ongoing wrist/forearm exercises at home. Will continue the plan of care.     Patient prognosis is Good.      Anticipated barriers to physical therapy: none    Goals: Dayana Is progressing well towards her goals.    Short Term Goals: 6 weeks   Patient will report at least 10% increase in functional capacity from initial QuickDash score to indicate clinically significant functional improvement  Patient will report at least 10 point increase on initial FOTO score to indicate clinically significant functional improvement  Patient will demonstrate passive right shoulder flexion, scaption, abduction to at least 130 degrees to improve functional mobility during ADLs  Patient will demonstrate passive right shoulder external rotation to at least 45 degrees to improve functional mobility during ADLs       Long Term Goals: 12 weeks    Patient will report at least 20% increase in functional capacity from initial QuickDash score to indicate clinically significant functional improvement  Patient will report at least 20 point increase on initial FOTO score to indicate clinically significant functional improvement  Patient will demonstrate active right shoulder flexion, scaption, abduction to at least 165 degrees to improve functional mobility during ADLs  Patient will demonstrate active right shoulder external rotation and internal rotation to 70 degrees and 90 degrees respectively  Patient will demonstrate ability to return to light gardening and report 2/10 pain or less    Plan     Plan of care Certification: 8/27/2024 to 11/19/2024.     Outpatient Physical Therapy 3 times weekly for 12 weeks to include the following interventions: Manual Therapy, Moist Heat/ Ice, Neuromuscular Re-ed, Patient Education, Self Care, Therapeutic Activities, and Therapeutic Exercise.     Regulo Hernandez, PT

## 2024-09-12 ENCOUNTER — CLINICAL SUPPORT (OUTPATIENT)
Dept: REHABILITATION | Facility: HOSPITAL | Age: 64
End: 2024-09-12
Payer: COMMERCIAL

## 2024-09-12 DIAGNOSIS — Z98.890 S/P LEFT ROTATOR CUFF REPAIR: Primary | ICD-10-CM

## 2024-09-12 DIAGNOSIS — Z98.890 STATUS POST LABRAL REPAIR OF SHOULDER: ICD-10-CM

## 2024-09-12 PROCEDURE — 97140 MANUAL THERAPY 1/> REGIONS: CPT

## 2024-09-12 PROCEDURE — 97112 NEUROMUSCULAR REEDUCATION: CPT

## 2024-09-12 PROCEDURE — 97110 THERAPEUTIC EXERCISES: CPT

## 2024-09-13 NOTE — PROGRESS NOTES
Physical Therapy Treatment Note     Name: Dayana Mejia  Clinic Number: [id number]    Therapy Diagnosis: Aftercare Rotator Cuff Repair  Physician: Tavo Villalta MD    Visit Date: 9/12/2024    Physician Orders: PT Eval and Treat  Medical Diagnosis from Referral: Rotator cuff tear Supraspinatus, SLAP lesion  Evaluation Date: 8/27/2024  Authorization Period Expiration: medically necessary  Plan of Care Expiration: 11/19/2024  Visit # / Visits authorized: visit #6    Time In: 1103  Time Out: 1216  Total Billable Time: 53 minutes    Surgery: Rotator cuff repair, Labral repair left shoulder on 8/8/2024  Orthopedic Precautions: Standard rotator cuff precautions; Labral precautions  Pertinent History: see medical chart    Subjective     Patient reports: she is sore but overall doing pretty well. Took her pain meds again prior to coming for therapy. Reports she has a follow up visit on 9/16/24 with Dr. Villalta.        Response to previous treatment: good    Pain: 2/10  Location: left shoulder      Outcome Measure:  Therapist reviewed FOTO scores for Dayana Mejia on 8/27/2024. FOTO documents entered into EPIC - see Media section.  FOTO filled out by: Patient    Intake: Patient's Physical FS Primary Measure: 6  Intake: Risk Adjusted Statistical FOTO: 26  Intake: Limitation Score: 94%%  Intake: Category: Carrying  Intake: DASH: 92.5% disability (higher score greater disability)    Objective     Dayana received the following treatment:     Time Activities   Manual 15 Gentle PROM of the left shoulder, gentle mobs   TherAct     TherEx 25 pendulum, chin tucks,scapular retractions, shoulder rolls, shoulder shrugs, active  ball squeeze, active wrist flex/ext, active elbow supination/pronation    Gait     Neuro Re-ed 13 rhythmic stabilization, scapula neuro re-ed   Modalities 10/10 Moist heat pre exercise; CP post exercise   E-Stim     Dry Needling     Canalith Repositioning       Home Exercises Provided and Patient Education  Provided     Education provided:   -Plan of care, HEP, protocol and orthopedic/surgery restrictions     Written Home Exercises Provided: yes.  Exercises were reviewed and Trupti was able to demonstrate them prior to the end of the session. Trupti demonstrated good  understanding of the education provided.     See EMR under Patient Instructions for exercises provided 8/27/2024.    Assessment     Dayana is a 64 y.o. female referred to outpatient Physical Therapy with a medical diagnosis of Rotator cuff tear Supraspinatus, SLAP lesion with Labral repair. Patient presents with pain, decreased ROM with surgical restrictions, decreased strength and functional reach and use of the right arm all resulting in decreased functional capacity.     Dayana received PROM exercises to the left shoulder. Slowly able to achieve more PROM today again with pain meds on board. Still painful at end range. Continued with rhythmic stabilization and scapula neuro re-ed exercises and she tolerated it well. Goals remain protect tissue repair, decrease inflammation, decrease pain, postural education and PROM as instructed. Encouraged ongoing wrist/forearm exercises at home. Will continue the plan of care.     Patient prognosis is Good.      Anticipated barriers to physical therapy: none    Goals: Dayana Is progressing well towards her goals.    Short Term Goals: 6 weeks   Patient will report at least 10% increase in functional capacity from initial QuickDash score to indicate clinically significant functional improvement  Patient will report at least 10 point increase on initial FOTO score to indicate clinically significant functional improvement  Patient will demonstrate passive right shoulder flexion, scaption, abduction to at least 130 degrees to improve functional mobility during ADLs  Patient will demonstrate passive right shoulder external rotation to at least 45 degrees to improve functional mobility during ADLs       Long Term Goals: 12  weeks   Patient will report at least 20% increase in functional capacity from initial QuickDash score to indicate clinically significant functional improvement  Patient will report at least 20 point increase on initial FOTO score to indicate clinically significant functional improvement  Patient will demonstrate active right shoulder flexion, scaption, abduction to at least 165 degrees to improve functional mobility during ADLs  Patient will demonstrate active right shoulder external rotation and internal rotation to 70 degrees and 90 degrees respectively  Patient will demonstrate ability to return to light gardening and report 2/10 pain or less    Plan     Plan of care Certification: 8/27/2024 to 11/19/2024.     Outpatient Physical Therapy 3 times weekly for 12 weeks to include the following interventions: Manual Therapy, Moist Heat/ Ice, Neuromuscular Re-ed, Patient Education, Self Care, Therapeutic Activities, and Therapeutic Exercise.     Regulo Hernandez, PT

## 2024-09-17 ENCOUNTER — CLINICAL SUPPORT (OUTPATIENT)
Dept: REHABILITATION | Facility: HOSPITAL | Age: 64
End: 2024-09-17
Payer: COMMERCIAL

## 2024-09-17 DIAGNOSIS — Z98.890 S/P LEFT ROTATOR CUFF REPAIR: Primary | ICD-10-CM

## 2024-09-17 DIAGNOSIS — Z98.890 STATUS POST LABRAL REPAIR OF SHOULDER: ICD-10-CM

## 2024-09-17 PROCEDURE — 97112 NEUROMUSCULAR REEDUCATION: CPT

## 2024-09-17 PROCEDURE — 97110 THERAPEUTIC EXERCISES: CPT

## 2024-09-17 PROCEDURE — 97140 MANUAL THERAPY 1/> REGIONS: CPT

## 2024-09-17 NOTE — PROGRESS NOTES
"  Physical Therapy Treatment Note     Name: Dayana Mejia  Clinic Number: [id number]    Therapy Diagnosis: Aftercare Rotator Cuff Repair  Physician: Tavo Villalta MD    Visit Date: 9/17/2024    Physician Orders: PT Eval and Treat  Medical Diagnosis from Referral: Rotator cuff tear Supraspinatus, SLAP lesion  Evaluation Date: 8/27/2024  Authorization Period Expiration: medically necessary  Plan of Care Expiration: 11/19/2024  Visit # / Visits authorized: visit #8    Time In: 1107  Time Out: 1220  Total Billable Time: 53 minutes    Surgery: Rotator cuff repair, Labral repair left shoulder on 8/8/2024  Orthopedic Precautions: Standard rotator cuff precautions; Labral precautions  Pertinent History: see medical chart    Subjective     Patient reports: that she saw her doctor and was taken out of her shoulder brace and now it is time to "up the game." Took her pain meds prior to coming for therapy. Returns to Dr Villalta in 4 weeks.      Response to previous treatment: good    Pain: 2/10  Location: left shoulder      Outcome Measure:  Therapist reviewed FOTO scores for Dayana Mejia on 8/27/2024. FOTO documents entered into EPIC - see Media section.  FOTO filled out by: Patient    Intake: Patient's Physical FS Primary Measure: 6  Intake: Risk Adjusted Statistical FOTO: 26  Intake: Limitation Score: 94%%  Intake: Category: Carrying  Intake: DASH: 92.5% disability (higher score greater disability)    Objective     Dayana received the following treatment:     Time Activities   Manual 15 Gentle PROM of the left shoulder, gentle mobs   TherAct     TherEx 25 pulleys, finger wall climb, wand press, wand elevations, scapular retractions, side lying ER, serratus punch, resisted triceps   Gait     Neuro Re-ed 13 rhythmic stabilization, scapula neuro re-ed   Modalities 10/10 Moist heat pre exercise; CP post exercise   E-Stim     Dry Needling     Canalith Repositioning       Home Exercises Provided and Patient Education Provided "     Education provided:   -Plan of care, HEP, protocol and orthopedic/surgery restrictions     Written Home Exercises Provided: yes.  Exercises were reviewed and Trupti was able to demonstrate them prior to the end of the session. Trupti demonstrated good  understanding of the education provided.     See EMR under Patient Instructions for exercises provided 8/27/2024.    Assessment     Dayana is a 64 y.o. female referred to outpatient Physical Therapy with a medical diagnosis of Rotator cuff tear Supraspinatus, SLAP lesion with Labral repair. Patient presents with pain, decreased ROM with surgical restrictions, decreased strength and functional reach and use of the right arm all resulting in decreased functional capacity.     Dayana received PROM exercises to the left shoulder and slowly we were able to push a little more PROM today. Added pulley's and finger wall climbs to facilitate greater shoulder elevation and she tolerated it pretty well. She was given a pulley for home use. She remains painful at end ranges. Continued with rhythmic stabilization and scapula neuro re-ed exercises and she tolerated it well. Goals remain protect tissue repair, decrease inflammation, decrease pain, postural education and PROM as instructed. Encouraged ongoing wrist/forearm exercises at home. Will continue the plan of care.     Patient prognosis is Good.      Anticipated barriers to physical therapy: none    Goals: Dayana Is progressing well towards her goals.    Short Term Goals: 6 weeks   Patient will report at least 10% increase in functional capacity from initial QuickDash score to indicate clinically significant functional improvement  Patient will report at least 10 point increase on initial FOTO score to indicate clinically significant functional improvement  Patient will demonstrate passive right shoulder flexion, scaption, abduction to at least 130 degrees to improve functional mobility during ADLs  Patient will demonstrate  passive right shoulder external rotation to at least 45 degrees to improve functional mobility during ADLs       Long Term Goals: 12 weeks   Patient will report at least 20% increase in functional capacity from initial QuickDash score to indicate clinically significant functional improvement  Patient will report at least 20 point increase on initial FOTO score to indicate clinically significant functional improvement  Patient will demonstrate active right shoulder flexion, scaption, abduction to at least 165 degrees to improve functional mobility during ADLs  Patient will demonstrate active right shoulder external rotation and internal rotation to 70 degrees and 90 degrees respectively  Patient will demonstrate ability to return to light gardening and report 2/10 pain or less    Plan     Plan of care Certification: 8/27/2024 to 11/19/2024.     Outpatient Physical Therapy 3 times weekly for 12 weeks to include the following interventions: Manual Therapy, Moist Heat/ Ice, Neuromuscular Re-ed, Patient Education, Self Care, Therapeutic Activities, and Therapeutic Exercise.     Regulo Hernandez, PT

## 2024-09-19 ENCOUNTER — CLINICAL SUPPORT (OUTPATIENT)
Dept: REHABILITATION | Facility: HOSPITAL | Age: 64
End: 2024-09-19
Payer: COMMERCIAL

## 2024-09-19 DIAGNOSIS — Z98.890 STATUS POST LABRAL REPAIR OF SHOULDER: ICD-10-CM

## 2024-09-19 DIAGNOSIS — Z98.890 S/P LEFT ROTATOR CUFF REPAIR: Primary | ICD-10-CM

## 2024-09-19 PROCEDURE — 97110 THERAPEUTIC EXERCISES: CPT

## 2024-09-19 PROCEDURE — 97112 NEUROMUSCULAR REEDUCATION: CPT

## 2024-09-19 PROCEDURE — 97140 MANUAL THERAPY 1/> REGIONS: CPT

## 2024-09-19 NOTE — PROGRESS NOTES
Physical Therapy Treatment Note     Name: Dayana Mejia  Clinic Number: [id number]    Therapy Diagnosis: Aftercare Rotator Cuff Repair  Physician: Tavo Villalta MD    Visit Date: 9/19/2024    Physician Orders: PT Eval and Treat  Medical Diagnosis from Referral: Rotator cuff tear Supraspinatus, SLAP lesion  Evaluation Date: 8/27/2024  Authorization Period Expiration: medically necessary  Plan of Care Expiration: 11/19/2024  Visit # / Visits authorized: visit #9    Time In: 1100  Time Out: 1223  Total Billable Time: 53 minutes    Surgery: Rotator cuff repair, Labral repair left shoulder on 8/8/2024  Orthopedic Precautions: Standard rotator cuff precautions; Labral precautions  Pertinent History: see medical chart    Subjective     Patient reports: that she is a little sore today. Had trouble getting comfortable to sleep last night. Working on her pulleys' at home. Returns to Dr Villalta in 4 weeks.      Response to previous treatment: good    Pain: 2/10  Location: left shoulder      Outcome Measure:  Therapist reviewed FOTO scores for Dayana Mejia on 8/27/2024. FOTO documents entered into EPIC - see Media section.  FOTO filled out by: Patient    Intake: Patient's Physical FS Primary Measure: 6  Intake: Risk Adjusted Statistical FOTO: 26  Intake: Limitation Score: 94%%  Intake: Category: Carrying  Intake: DASH: 92.5% disability (higher score greater disability)    Objective     Dayana received the following treatment:     Time Activities   Manual 15 Gentle PROM of the left shoulder, gentle mobs   TherAct     TherEx 25 pulleys, finger wall climb, wand press, wand elevations, scapular retractions, side lying ER, serratus punch, resisted triceps   Gait     Neuro Re-ed 13 rhythmic stabilization, scapula neuro re-ed   Modalities 10/10 Moist heat pre exercise; CP post exercise   E-Stim     Dry Needling     Canalith Repositioning       Home Exercises Provided and Patient Education Provided     Education provided:   -Plan of  care, HEP, protocol and orthopedic/surgery restrictions     Written Home Exercises Provided: yes.  Exercises were reviewed and Trupti was able to demonstrate them prior to the end of the session. Trupti demonstrated good  understanding of the education provided.     See EMR under Patient Instructions for exercises provided 8/27/2024.    Assessment     Dayana is a 64 y.o. female referred to outpatient Physical Therapy with a medical diagnosis of Rotator cuff tear Supraspinatus, SLAP lesion with Labral repair. Patient presents with pain, decreased ROM with surgical restrictions, decreased strength and functional reach and use of the right arm all resulting in decreased functional capacity.     Dayana received PROM exercises to the left shoulder and slowly we were able to push a little more PROM today. Continued with pulley's and finger wall climbs to facilitate greater shoulder elevation and she tolerated it pretty well. She remains painful at end ranges. She is using the pulley given for home use. Continued with rhythmic stabilization and scapula neuro re-ed exercises and she demonstrated much better scapula control. Goals remain protect tissue repair, decrease inflammation, decrease pain, postural education and PROM as instructed. Encouraged ongoing wrist/forearm exercises at home. Will continue the plan of care.     Patient prognosis is Good.      Anticipated barriers to physical therapy: none    Goals: Dayana Is progressing well towards her goals.    Short Term Goals: 6 weeks   Patient will report at least 10% increase in functional capacity from initial QuickDash score to indicate clinically significant functional improvement  Patient will report at least 10 point increase on initial FOTO score to indicate clinically significant functional improvement  Patient will demonstrate passive right shoulder flexion, scaption, abduction to at least 130 degrees to improve functional mobility during ADLs  Patient will  demonstrate passive right shoulder external rotation to at least 45 degrees to improve functional mobility during ADLs       Long Term Goals: 12 weeks   Patient will report at least 20% increase in functional capacity from initial QuickDash score to indicate clinically significant functional improvement  Patient will report at least 20 point increase on initial FOTO score to indicate clinically significant functional improvement  Patient will demonstrate active right shoulder flexion, scaption, abduction to at least 165 degrees to improve functional mobility during ADLs  Patient will demonstrate active right shoulder external rotation and internal rotation to 70 degrees and 90 degrees respectively  Patient will demonstrate ability to return to light gardening and report 2/10 pain or less    Plan     Plan of care Certification: 8/27/2024 to 11/19/2024.     Outpatient Physical Therapy 3 times weekly for 12 weeks to include the following interventions: Manual Therapy, Moist Heat/ Ice, Neuromuscular Re-ed, Patient Education, Self Care, Therapeutic Activities, and Therapeutic Exercise.     Regulo Hernandez, PT

## 2024-09-20 ENCOUNTER — CLINICAL SUPPORT (OUTPATIENT)
Dept: REHABILITATION | Facility: HOSPITAL | Age: 64
End: 2024-09-20
Payer: COMMERCIAL

## 2024-09-20 DIAGNOSIS — Z98.890 S/P LEFT ROTATOR CUFF REPAIR: Primary | ICD-10-CM

## 2024-09-20 PROCEDURE — 97112 NEUROMUSCULAR REEDUCATION: CPT

## 2024-09-20 PROCEDURE — 97110 THERAPEUTIC EXERCISES: CPT

## 2024-09-20 PROCEDURE — 97140 MANUAL THERAPY 1/> REGIONS: CPT

## 2024-09-20 NOTE — PROGRESS NOTES
Physical Therapy Treatment Note     Name: Dayana Mejia  Clinic Number: [id number]    Therapy Diagnosis: Aftercare Rotator Cuff Repair  Physician: Tavo Villalta MD    Visit Date: 9/20/2024    Physician Orders: PT Eval and Treat  Medical Diagnosis from Referral: Rotator cuff tear Supraspinatus, SLAP lesion  Evaluation Date: 8/27/2024  Authorization Period Expiration: medically necessary  Plan of Care Expiration: 11/19/2024  Visit # / Visits authorized: visit #10    Time In: 1110  Time Out: 1200  Total Billable Time: 50 minutes    Surgery: Rotator cuff repair, Labral repair left shoulder on 8/8/2024  Orthopedic Precautions: Standard rotator cuff precautions; Labral precautions  Pertinent History: see medical chart    Subjective     Patient reports: that she continues to have some soreness and it hurts sometimes as she forget and routinely tries to use it for activity. Still taking pain meds prior to therapy session.       Response to previous treatment: good    Pain: 2/10  Location: left shoulder      Outcome Measure:  Therapist reviewed FOTO scores for Dayana Mejia on 8/27/2024. FOTO documents entered into EPIC - see Media section.  FOTO filled out by: Patient    Intake: Patient's Physical FS Primary Measure: 6  Intake: Risk Adjusted Statistical FOTO: 26  Intake: Limitation Score: 94%%  Intake: Category: Carrying  Intake: DASH: 92.5% disability (higher score greater disability)    Objective     Dayana received the following treatment:     Time Activities   Manual 10 Gentle PROM of the left shoulder, gentle mobs   TherAct     TherEx 20 pulleys, finger wall climb, wand press, wand elevations, scapular retractions, side lying ER, serratus punch, resisted triceps   Gait     Neuro Re-ed 10 rhythmic stabilization, scapula neuro re-ed   Modalities 10/10 Moist heat pre exercise; CP post exercise   E-Stim     Dry Needling     Canalith Repositioning       Home Exercises Provided and Patient Education Provided     Education  provided:   -Plan of care, HEP, protocol and orthopedic/surgery restrictions     Written Home Exercises Provided: yes.  Exercises were reviewed and Trupti was able to demonstrate them prior to the end of the session. Trupti demonstrated good  understanding of the education provided.     See EMR under Patient Instructions for exercises provided 8/27/2024.    Assessment     Dayana is a 64 y.o. female referred to outpatient Physical Therapy with a medical diagnosis of Rotator cuff tear Supraspinatus, SLAP lesion with Labral repair. Patient presents with pain, decreased ROM with surgical restrictions, decreased strength and functional reach and use of the right arm all resulting in decreased functional capacity.     Dayana received PROM exercises to the left shoulder and slowly we were able to push a little more PROM today, along with some gentle AAROM. Continued with pulley's and finger wall climbs to facilitate greater shoulder elevation and she tolerated it pretty well up to about 90 shoulder flexion. She remains painful at end ranges. Goals remain protect tissue repair, decrease inflammation, decrease pain, postural education and PROM as instructed. Will continue the plan of care.     Patient prognosis is Good.      Anticipated barriers to physical therapy: none    Goals: Dayana Is progressing well towards her goals.    Short Term Goals: 6 weeks   Patient will report at least 10% increase in functional capacity from initial QuickDash score to indicate clinically significant functional improvement  Patient will report at least 10 point increase on initial FOTO score to indicate clinically significant functional improvement  Patient will demonstrate passive right shoulder flexion, scaption, abduction to at least 130 degrees to improve functional mobility during ADLs  Patient will demonstrate passive right shoulder external rotation to at least 45 degrees to improve functional mobility during ADLs       Long Term Goals:  12 weeks   Patient will report at least 20% increase in functional capacity from initial QuickDash score to indicate clinically significant functional improvement  Patient will report at least 20 point increase on initial FOTO score to indicate clinically significant functional improvement  Patient will demonstrate active right shoulder flexion, scaption, abduction to at least 165 degrees to improve functional mobility during ADLs  Patient will demonstrate active right shoulder external rotation and internal rotation to 70 degrees and 90 degrees respectively  Patient will demonstrate ability to return to light gardening and report 2/10 pain or less    Plan     Plan of care Certification: 8/27/2024 to 11/19/2024.     Outpatient Physical Therapy 3 times weekly for 12 weeks to include the following interventions: Manual Therapy, Moist Heat/ Ice, Neuromuscular Re-ed, Patient Education, Self Care, Therapeutic Activities, and Therapeutic Exercise.     Bear Cline, PT

## 2024-09-24 ENCOUNTER — CLINICAL SUPPORT (OUTPATIENT)
Dept: REHABILITATION | Facility: HOSPITAL | Age: 64
End: 2024-09-24
Payer: COMMERCIAL

## 2024-09-24 DIAGNOSIS — Z98.890 S/P LEFT ROTATOR CUFF REPAIR: Primary | ICD-10-CM

## 2024-09-24 DIAGNOSIS — Z98.890 STATUS POST LABRAL REPAIR OF SHOULDER: ICD-10-CM

## 2024-09-24 PROCEDURE — 97112 NEUROMUSCULAR REEDUCATION: CPT

## 2024-09-24 PROCEDURE — 97140 MANUAL THERAPY 1/> REGIONS: CPT

## 2024-09-24 PROCEDURE — 97110 THERAPEUTIC EXERCISES: CPT

## 2024-09-24 NOTE — PROGRESS NOTES
Physical Therapy Treatment Note     Name: Dayana Mejia  Clinic Number: [id number]    Therapy Diagnosis: Aftercare Rotator Cuff Repair  Physician: Tavo Villalta MD    Visit Date: 9/24/2024    Physician Orders: PT Eval and Treat  Medical Diagnosis from Referral: Rotator cuff tear Supraspinatus, SLAP lesion  Evaluation Date: 8/27/2024  Authorization Period Expiration: medically necessary  Plan of Care Expiration: 11/19/2024  Visit # / Visits authorized: visit #11    Time In: 1103  Time Out: 1216  Total Billable Time: 53 minutes    Surgery: Rotator cuff repair, Labral repair left shoulder on 8/8/2024  Orthopedic Precautions: Standard rotator cuff precautions; Labral precautions  Pertinent History: see medical chart    Subjective     Patient reports: that she continues to have some soreness but there is pain if moves the wrong way. Agrees to PT session.      Response to previous treatment: good    Pain: 2/10  Location: left shoulder      Outcome Measure:  Therapist reviewed FOTO scores for Dayana Mejia on 8/27/2024. FOTO documents entered into EPIC - see Media section.  FOTO filled out by: Patient    Intake: Patient's Physical FS Primary Measure: 6  Intake: Risk Adjusted Statistical FOTO: 26  Intake: Limitation Score: 94%%  Intake: Category: Carrying  Intake: DASH: 92.5% disability (higher score greater disability)    Objective     Dayana received the following treatment:     Time Activities   Manual 18 Gentle PROM of the left shoulder, gentle mobs; shoulder isometrics   TherAct     TherEx 25 pulleys, finger wall climb, wand press, wand elevations, scapular retractions, side lying ER, serratus punch, resisted triceps   Gait     Neuro Re-ed 10 rhythmic stabilization, scapula neuro re-ed   Modalities 10/10 Moist heat pre exercise; CP post exercise   E-Stim     Dry Needling     Canalith Repositioning       Home Exercises Provided and Patient Education Provided     Education provided:   -Plan of care, HEP, protocol and  orthopedic/surgery restrictions     Written Home Exercises Provided: yes.  Exercises were reviewed and Trupti was able to demonstrate them prior to the end of the session. Trupti demonstrated good  understanding of the education provided.     See EMR under Patient Instructions for exercises provided 8/27/2024.    Assessment     Dayana is a 64 y.o. female referred to outpatient Physical Therapy with a medical diagnosis of Rotator cuff tear Supraspinatus, SLAP lesion with Labral repair. Patient presents with pain, decreased ROM with surgical restrictions, decreased strength and functional reach and use of the right arm all resulting in decreased functional capacity.     Dayana is almost 7 weeks post op. She received PROM exercises to the left shoulder and slowly we were able to push a little more PROM today, along with some gentle AAROM. Continued with pulley's and finger wall climbs to facilitate greater shoulder elevation and she tolerated it pretty well up to about 100 degrees shoulder flexion. She remains painful at end ranges. Goals remain protect tissue repair, decrease inflammation, decrease pain, postural education and PROM as instructed. Will continue the plan of care and progress per protocol.    Patient prognosis is Good.      Anticipated barriers to physical therapy: none    Goals: Dayana Is progressing well towards her goals.    Short Term Goals: 6 weeks   Patient will report at least 10% increase in functional capacity from initial QuickDash score to indicate clinically significant functional improvement  Patient will report at least 10 point increase on initial FOTO score to indicate clinically significant functional improvement  Patient will demonstrate passive right shoulder flexion, scaption, abduction to at least 130 degrees to improve functional mobility during ADLs  Patient will demonstrate passive right shoulder external rotation to at least 45 degrees to improve functional mobility during ADLs        Long Term Goals: 12 weeks   Patient will report at least 20% increase in functional capacity from initial QuickDash score to indicate clinically significant functional improvement  Patient will report at least 20 point increase on initial FOTO score to indicate clinically significant functional improvement  Patient will demonstrate active right shoulder flexion, scaption, abduction to at least 165 degrees to improve functional mobility during ADLs  Patient will demonstrate active right shoulder external rotation and internal rotation to 70 degrees and 90 degrees respectively  Patient will demonstrate ability to return to light gardening and report 2/10 pain or less    Plan     Plan of care Certification: 8/27/2024 to 11/19/2024.     Outpatient Physical Therapy 3 times weekly for 12 weeks to include the following interventions: Manual Therapy, Moist Heat/ Ice, Neuromuscular Re-ed, Patient Education, Self Care, Therapeutic Activities, and Therapeutic Exercise.     Regulo Hernandez, PT

## 2024-09-26 ENCOUNTER — CLINICAL SUPPORT (OUTPATIENT)
Dept: REHABILITATION | Facility: HOSPITAL | Age: 64
End: 2024-09-26
Payer: COMMERCIAL

## 2024-09-26 DIAGNOSIS — Z98.890 STATUS POST LABRAL REPAIR OF SHOULDER: ICD-10-CM

## 2024-09-26 DIAGNOSIS — Z98.890 S/P LEFT ROTATOR CUFF REPAIR: Primary | ICD-10-CM

## 2024-09-26 PROCEDURE — 97110 THERAPEUTIC EXERCISES: CPT

## 2024-09-26 PROCEDURE — 97140 MANUAL THERAPY 1/> REGIONS: CPT

## 2024-09-26 PROCEDURE — 97112 NEUROMUSCULAR REEDUCATION: CPT

## 2024-09-26 NOTE — PROGRESS NOTES
Physical Therapy Treatment Note     Name: Dayana Mejia  Clinic Number: [id number]    Therapy Diagnosis: Aftercare Rotator Cuff Repair  Physician: Tavo Villalta MD    Visit Date: 9/26/2024    Physician Orders: PT Eval and Treat  Medical Diagnosis from Referral: Rotator cuff tear Supraspinatus, SLAP lesion  Evaluation Date: 8/27/2024  Authorization Period Expiration: medically necessary  Plan of Care Expiration: 11/19/2024  Visit # / Visits authorized: visit #11    Time In: 1104  Time Out: 1217  Total Billable Time: 53 minutes    Surgery: Rotator cuff repair, Labral repair left shoulder on 8/8/2024  Orthopedic Precautions: Standard rotator cuff precautions; Labral precautions  Pertinent History: see medical chart    Subjective     Patient reports: that she continues to have some soreness but pain if moves the wrong way or passed end range. Working on her pulley's at home. Icing as needed. Agrees to PT session.      Response to previous treatment: good    Pain: 2/10  Location: left shoulder      Outcome Measure:  Therapist reviewed FOTO scores for Dayana Mejia on 8/27/2024. FOTO documents entered into EPIC - see Media section.  FOTO filled out by: Patient    Intake: Patient's Physical FS Primary Measure: 6  Intake: Risk Adjusted Statistical FOTO: 26  Intake: Limitation Score: 94%%  Intake: Category: Carrying  Intake: DASH: 92.5% disability (higher score greater disability)    Objective     Dayana received the following treatment:     Time Activities   Manual 18 Gentle PROM of the left shoulder, gentle mobs; shoulder isometrics   TherAct     TherEx 25 pulleys, finger wall climb, wand press, wand elevations, scapular retractions, side lying ER, serratus punch, resisted triceps   Gait     Neuro Re-ed 10 rhythmic stabilization, scapula neuro re-ed   Modalities 10/10 Moist heat pre exercise; CP post exercise   E-Stim     Dry Needling     Canalith Repositioning       Home Exercises Provided and Patient Education Provided      Education provided:   -Plan of care, HEP, protocol and orthopedic/surgery restrictions     Written Home Exercises Provided: yes.  Exercises were reviewed and Trupti was able to demonstrate them prior to the end of the session. Trupti demonstrated good  understanding of the education provided.     See EMR under Patient Instructions for exercises provided 8/27/2024.    Assessment     Dayana is a 64 y.o. female referred to outpatient Physical Therapy with a medical diagnosis of Rotator cuff tear Supraspinatus, SLAP lesion with Labral repair. Patient presents with pain, decreased ROM with surgical restrictions, decreased strength and functional reach and use of the right arm all resulting in decreased functional capacity.     Dayana is 7 weeks post op. She received PROM exercises to the left shoulder and slowly we were able to push a little more PROM today, along with some gentle AAROM. Continued with pulley's and finger wall climbs to facilitate greater shoulder elevation and she tolerated it pretty well up to about 100 degrees shoulder flexion. She remains painful at end ranges. Goals remain protect tissue repair, decrease inflammation, decrease pain, postural education and PROM as instructed. Will continue the plan of care and progress per protocol.    Patient prognosis is Good.      Anticipated barriers to physical therapy: none    Goals: Dayana Is progressing well towards her goals.    Short Term Goals: 6 weeks   Patient will report at least 10% increase in functional capacity from initial QuickDash score to indicate clinically significant functional improvement  Patient will report at least 10 point increase on initial FOTO score to indicate clinically significant functional improvement  Patient will demonstrate passive right shoulder flexion, scaption, abduction to at least 130 degrees to improve functional mobility during ADLs  Patient will demonstrate passive right shoulder external rotation to at least 45  degrees to improve functional mobility during ADLs       Long Term Goals: 12 weeks   Patient will report at least 20% increase in functional capacity from initial QuickDash score to indicate clinically significant functional improvement  Patient will report at least 20 point increase on initial FOTO score to indicate clinically significant functional improvement  Patient will demonstrate active right shoulder flexion, scaption, abduction to at least 165 degrees to improve functional mobility during ADLs  Patient will demonstrate active right shoulder external rotation and internal rotation to 70 degrees and 90 degrees respectively  Patient will demonstrate ability to return to light gardening and report 2/10 pain or less    Plan     Plan of care Certification: 8/27/2024 to 11/19/2024.     Outpatient Physical Therapy 3 times weekly for 12 weeks to include the following interventions: Manual Therapy, Moist Heat/ Ice, Neuromuscular Re-ed, Patient Education, Self Care, Therapeutic Activities, and Therapeutic Exercise.     Regulo Hernandez, PT

## 2024-09-27 ENCOUNTER — CLINICAL SUPPORT (OUTPATIENT)
Dept: REHABILITATION | Facility: HOSPITAL | Age: 64
End: 2024-09-27
Payer: COMMERCIAL

## 2024-09-27 DIAGNOSIS — Z98.890 S/P LEFT ROTATOR CUFF REPAIR: Primary | ICD-10-CM

## 2024-09-27 DIAGNOSIS — Z98.890 STATUS POST LABRAL REPAIR OF SHOULDER: ICD-10-CM

## 2024-09-27 PROCEDURE — 97140 MANUAL THERAPY 1/> REGIONS: CPT

## 2024-09-27 PROCEDURE — 97112 NEUROMUSCULAR REEDUCATION: CPT

## 2024-09-27 PROCEDURE — 97110 THERAPEUTIC EXERCISES: CPT

## 2024-09-27 NOTE — PROGRESS NOTES
Physical Therapy Treatment Note     Name: Dayana Mejia  Clinic Number: [id number]    Therapy Diagnosis: Aftercare Rotator Cuff Repair  Physician: Tavo Villalta MD    Visit Date: 9/27/2024    Physician Orders: PT Eval and Treat  Medical Diagnosis from Referral: Rotator cuff tear Supraspinatus, SLAP lesion  Evaluation Date: 8/27/2024  Authorization Period Expiration: medically necessary  Plan of Care Expiration: 11/19/2024  Visit # / Visits authorized: visit #12    Time In: 1107  Time Out: 1220  Total Billable Time: 53 minutes    Surgery: Rotator cuff repair, Labral repair left shoulder on 8/8/2024  Orthopedic Precautions: Standard rotator cuff precautions; Labral precautions  Pertinent History: see medical chart    Subjective     Patient reports: a little more sore today than yesterday but doing pretty well. Working on her pulley's at home. Icing as needed. Agrees to PT session.      Response to previous treatment: good    Pain: 2/10  Location: left shoulder      Outcome Measure:  Therapist reviewed FOTO scores for Dayana Mejia on 8/27/2024. FOTO documents entered into EPIC - see Media section.  FOTO filled out by: Patient    Intake: Patient's Physical FS Primary Measure: 6  Intake: Risk Adjusted Statistical FOTO: 26  Intake: Limitation Score: 94%%  Intake: Category: Carrying  Intake: DASH: 92.5% disability (higher score greater disability)    Objective     Dayana received the following treatment:     Time Activities   Manual 18 Gentle PROM of the left shoulder, gentle mobs; shoulder isometrics   TherAct     TherEx 25 pulleys, finger wall climb, wand press, wand elevations, scapular retractions, side lying ER, serratus punch, resisted triceps   Gait     Neuro Re-ed 10 rhythmic stabilization, scapula neuro re-ed   Modalities 10/10 Moist heat pre exercise; CP post exercise   E-Stim     Dry Needling     Canalith Repositioning       Home Exercises Provided and Patient Education Provided     Education provided:   -Plan  of care, HEP, protocol and orthopedic/surgery restrictions     Written Home Exercises Provided: yes.  Exercises were reviewed and Trupti was able to demonstrate them prior to the end of the session. Trupti demonstrated good  understanding of the education provided.     See EMR under Patient Instructions for exercises provided 8/27/2024.    Assessment     Dayana is a 64 y.o. female referred to outpatient Physical Therapy with a medical diagnosis of Rotator cuff tear Supraspinatus, SLAP lesion with Labral repair. Patient presents with pain, decreased ROM with surgical restrictions, decreased strength and functional reach and use of the right arm all resulting in decreased functional capacity.     Dayana is 7 weeks post op. She received PROM exercises to the left shoulder and slowly we were able to push a little more PROM today, along with some gentle AAROM. Good elevation with the pulley's and finger wall climbs to about 110-115 degrees of shoulder flexion. She remains painful at end ranges. Goals remain protect tissue repair, decrease inflammation, decrease pain, postural education and PROM as instructed. Will continue the plan of care and progress per protocol.    Patient prognosis is Good.      Anticipated barriers to physical therapy: none    Goals: Dayana Is progressing well towards her goals.    Short Term Goals: 6 weeks   Patient will report at least 10% increase in functional capacity from initial QuickDash score to indicate clinically significant functional improvement  Patient will report at least 10 point increase on initial FOTO score to indicate clinically significant functional improvement  Patient will demonstrate passive right shoulder flexion, scaption, abduction to at least 130 degrees to improve functional mobility during ADLs  Patient will demonstrate passive right shoulder external rotation to at least 45 degrees to improve functional mobility during ADLs       Long Term Goals: 12 weeks   Patient  will report at least 20% increase in functional capacity from initial QuickDash score to indicate clinically significant functional improvement  Patient will report at least 20 point increase on initial FOTO score to indicate clinically significant functional improvement  Patient will demonstrate active right shoulder flexion, scaption, abduction to at least 165 degrees to improve functional mobility during ADLs  Patient will demonstrate active right shoulder external rotation and internal rotation to 70 degrees and 90 degrees respectively  Patient will demonstrate ability to return to light gardening and report 2/10 pain or less    Plan     Plan of care Certification: 8/27/2024 to 11/19/2024.     Outpatient Physical Therapy 3 times weekly for 12 weeks to include the following interventions: Manual Therapy, Moist Heat/ Ice, Neuromuscular Re-ed, Patient Education, Self Care, Therapeutic Activities, and Therapeutic Exercise.     Regulo Hernandez, PT             Admission

## 2024-10-01 ENCOUNTER — LAB VISIT (OUTPATIENT)
Dept: LAB | Facility: HOSPITAL | Age: 64
End: 2024-10-01
Attending: DERMATOLOGY
Payer: COMMERCIAL

## 2024-10-01 ENCOUNTER — CLINICAL SUPPORT (OUTPATIENT)
Dept: REHABILITATION | Facility: HOSPITAL | Age: 64
End: 2024-10-01
Payer: COMMERCIAL

## 2024-10-01 DIAGNOSIS — Z98.890 S/P LEFT ROTATOR CUFF REPAIR: Primary | ICD-10-CM

## 2024-10-01 DIAGNOSIS — L92.0 GRANULOMA ANNULARE: ICD-10-CM

## 2024-10-01 DIAGNOSIS — Z79.899 ENCOUNTER FOR LONG-TERM (CURRENT) USE OF MEDICATIONS: ICD-10-CM

## 2024-10-01 DIAGNOSIS — Z98.890 STATUS POST LABRAL REPAIR OF SHOULDER: ICD-10-CM

## 2024-10-01 DIAGNOSIS — Z79.899 NEED FOR PROPHYLACTIC CHEMOTHERAPY: Primary | ICD-10-CM

## 2024-10-01 DIAGNOSIS — L29.9 PRURITUS OF SKIN: ICD-10-CM

## 2024-10-01 LAB
ALBUMIN SERPL-MCNC: 3.5 G/DL (ref 3.4–4.8)
ALBUMIN/GLOB SERPL: 1.3 RATIO (ref 1.1–2)
ALP SERPL-CCNC: 67 UNIT/L (ref 40–150)
ALT SERPL-CCNC: 15 UNIT/L (ref 0–55)
ANION GAP SERPL CALC-SCNC: 8 MEQ/L
AST SERPL-CCNC: 15 UNIT/L (ref 5–34)
BASOPHILS # BLD AUTO: 0.04 X10(3)/MCL
BASOPHILS NFR BLD AUTO: 0.5 %
BILIRUB SERPL-MCNC: 0.3 MG/DL
BUN SERPL-MCNC: 13 MG/DL (ref 9.8–20.1)
CALCIUM SERPL-MCNC: 9.6 MG/DL (ref 8.4–10.2)
CHLORIDE SERPL-SCNC: 106 MMOL/L (ref 98–107)
CO2 SERPL-SCNC: 29 MMOL/L (ref 23–31)
CREAT SERPL-MCNC: 0.76 MG/DL (ref 0.55–1.02)
CREAT/UREA NIT SERPL: 17
EOSINOPHIL # BLD AUTO: 0.16 X10(3)/MCL (ref 0–0.9)
EOSINOPHIL NFR BLD AUTO: 1.9 %
ERYTHROCYTE [DISTWIDTH] IN BLOOD BY AUTOMATED COUNT: 12.2 % (ref 11.5–17)
GFR SERPLBLD CREATININE-BSD FMLA CKD-EPI: >60 ML/MIN/1.73/M2
GLOBULIN SER-MCNC: 2.8 GM/DL (ref 2.4–3.5)
GLUCOSE SERPL-MCNC: 141 MG/DL (ref 82–115)
HCT VFR BLD AUTO: 44.4 % (ref 37–47)
HGB BLD-MCNC: 14.7 G/DL (ref 12–16)
IMM GRANULOCYTES # BLD AUTO: 0.02 X10(3)/MCL (ref 0–0.04)
IMM GRANULOCYTES NFR BLD AUTO: 0.2 %
LYMPHOCYTES # BLD AUTO: 2.18 X10(3)/MCL (ref 0.6–4.6)
LYMPHOCYTES NFR BLD AUTO: 26.2 %
MCH RBC QN AUTO: 31.3 PG (ref 27–31)
MCHC RBC AUTO-ENTMCNC: 33.1 G/DL (ref 33–36)
MCV RBC AUTO: 94.7 FL (ref 80–94)
MONOCYTES # BLD AUTO: 0.59 X10(3)/MCL (ref 0.1–1.3)
MONOCYTES NFR BLD AUTO: 7.1 %
NEUTROPHILS # BLD AUTO: 5.34 X10(3)/MCL (ref 2.1–9.2)
NEUTROPHILS NFR BLD AUTO: 64.1 %
NRBC BLD AUTO-RTO: 0 %
PLATELET # BLD AUTO: 210 X10(3)/MCL (ref 130–400)
PMV BLD AUTO: 11.2 FL (ref 7.4–10.4)
POTASSIUM SERPL-SCNC: 4.2 MMOL/L (ref 3.5–5.1)
PROT SERPL-MCNC: 6.3 GM/DL (ref 5.8–7.6)
RBC # BLD AUTO: 4.69 X10(6)/MCL (ref 4.2–5.4)
SODIUM SERPL-SCNC: 143 MMOL/L (ref 136–145)
WBC # BLD AUTO: 8.33 X10(3)/MCL (ref 4.5–11.5)

## 2024-10-01 PROCEDURE — 97140 MANUAL THERAPY 1/> REGIONS: CPT

## 2024-10-01 PROCEDURE — 97112 NEUROMUSCULAR REEDUCATION: CPT

## 2024-10-01 PROCEDURE — 36415 COLL VENOUS BLD VENIPUNCTURE: CPT

## 2024-10-01 PROCEDURE — 97110 THERAPEUTIC EXERCISES: CPT

## 2024-10-01 PROCEDURE — 86480 TB TEST CELL IMMUN MEASURE: CPT

## 2024-10-01 PROCEDURE — 80053 COMPREHEN METABOLIC PANEL: CPT

## 2024-10-01 PROCEDURE — 85025 COMPLETE CBC W/AUTO DIFF WBC: CPT

## 2024-10-01 NOTE — PROGRESS NOTES
Physical Therapy Treatment Note     Name: Dyaana Mejia  Clinic Number: [id number]    Therapy Diagnosis: Aftercare Rotator Cuff Repair  Physician: Tavo Villalta MD    Visit Date: 10/1/2024    Physician Orders: PT Eval and Treat  Medical Diagnosis from Referral: Rotator cuff tear Supraspinatus, SLAP lesion  Evaluation Date: 8/27/2024  Authorization Period Expiration: medically necessary  Plan of Care Expiration: 11/19/2024  Visit # / Visits authorized: visit #14    Time In: 1101  Time Out: 1204  Total Billable Time: 53 minutes    Surgery: Rotator cuff repair, Labral repair left shoulder on 8/8/2024  Orthopedic Precautions: Standard rotator cuff precautions; Labral precautions  Pertinent History: see medical chart    Subjective     Patient reports: having some sore but not too bad. Working on her pulley's at home. Icing as needed. Agrees to PT session.      Response to previous treatment: good    Pain: 2/10  Location: left shoulder      Outcome Measure:  Therapist reviewed FOTO scores for Dayana Mejia on 8/27/2024. FOTO documents entered into EPIC - see Media section.  FOTO filled out by: Patient    Intake: Patient's Physical FS Primary Measure: 6  Intake: Risk Adjusted Statistical FOTO: 26  Intake: Limitation Score: 94%%  Intake: Category: Carrying  Intake: DASH: 92.5% disability (higher score greater disability)    Objective     Dayana received the following treatment:     Time Activities   Manual 18 Gentle PROM of the left shoulder, gentle mobs; shoulder isometrics   TherAct     TherEx 25 pulleys, finger wall climb, wand press, wand elevations, scapular retractions, side lying ER, serratus punch, resisted triceps   Gait     Neuro Re-ed 10 rhythmic stabilization, scapula neuro re-ed   Modalities 10/10 Moist heat pre exercise; CP post exercise   E-Stim     Dry Needling     Canalith Repositioning       Home Exercises Provided and Patient Education Provided     Education provided:   -Plan of care, HEP, protocol and  orthopedic/surgery restrictions     Written Home Exercises Provided: yes.  Exercises were reviewed and Trupti was able to demonstrate them prior to the end of the session. Trupti demonstrated good  understanding of the education provided.     See EMR under Patient Instructions for exercises provided 8/27/2024.    Assessment     Dayana is a 64 y.o. female referred to outpatient Physical Therapy with a medical diagnosis of Rotator cuff tear Supraspinatus, SLAP lesion with Labral repair. Patient presents with pain, decreased ROM with surgical restrictions, decreased strength and functional reach and use of the right arm all resulting in decreased functional capacity.     Dayana is almost 8 weeks post op. She received PROM exercises to the left shoulder and slowly we were able to push a little more PROM today. We did increase some AAROM to tolerance. Good elevation with the pulley's and finger wall climbs to about 135-145 degrees of shoulder flexion. She remains painful at end ranges. Goals remain protect tissue repair, decrease inflammation, decrease pain, postural education and PROM as instructed. Will continue the plan of care and progress per protocol.    Patient prognosis is Good.      Anticipated barriers to physical therapy: none    Goals: Dayana Is progressing well towards her goals.    Short Term Goals: 6 weeks   Patient will report at least 10% increase in functional capacity from initial QuickDash score to indicate clinically significant functional improvement  Patient will report at least 10 point increase on initial FOTO score to indicate clinically significant functional improvement  Patient will demonstrate passive right shoulder flexion, scaption, abduction to at least 130 degrees to improve functional mobility during ADLs  Patient will demonstrate passive right shoulder external rotation to at least 45 degrees to improve functional mobility during ADLs       Long Term Goals: 12 weeks   Patient will report  at least 20% increase in functional capacity from initial QuickDash score to indicate clinically significant functional improvement  Patient will report at least 20 point increase on initial FOTO score to indicate clinically significant functional improvement  Patient will demonstrate active right shoulder flexion, scaption, abduction to at least 165 degrees to improve functional mobility during ADLs  Patient will demonstrate active right shoulder external rotation and internal rotation to 70 degrees and 90 degrees respectively  Patient will demonstrate ability to return to light gardening and report 2/10 pain or less    Plan     Plan of care Certification: 8/27/2024 to 11/19/2024.     Outpatient Physical Therapy 3 times weekly for 12 weeks to include the following interventions: Manual Therapy, Moist Heat/ Ice, Neuromuscular Re-ed, Patient Education, Self Care, Therapeutic Activities, and Therapeutic Exercise.     Regulo Hernandez, PT

## 2024-10-03 ENCOUNTER — CLINICAL SUPPORT (OUTPATIENT)
Dept: REHABILITATION | Facility: HOSPITAL | Age: 64
End: 2024-10-03
Payer: COMMERCIAL

## 2024-10-03 DIAGNOSIS — Z98.890 STATUS POST LABRAL REPAIR OF SHOULDER: ICD-10-CM

## 2024-10-03 DIAGNOSIS — Z98.890 S/P LEFT ROTATOR CUFF REPAIR: Primary | ICD-10-CM

## 2024-10-03 LAB
GAMMA INTERFERON BACKGROUND BLD IA-ACNC: 0.01 IU/ML
M TB IFN-G BLD-IMP: NEGATIVE
M TB IFN-G CD4+ BCKGRND COR BLD-ACNC: 0.01 IU/ML
M TB IFN-G CD4+CD8+ BCKGRND COR BLD-ACNC: 0.02 IU/ML
MITOGEN IGNF BCKGRD COR BLD-ACNC: 9.99 IU/ML

## 2024-10-03 PROCEDURE — 97112 NEUROMUSCULAR REEDUCATION: CPT

## 2024-10-03 PROCEDURE — 97140 MANUAL THERAPY 1/> REGIONS: CPT

## 2024-10-03 PROCEDURE — 97110 THERAPEUTIC EXERCISES: CPT

## 2024-10-03 NOTE — PROGRESS NOTES
Physical Therapy Treatment Note     Name: Dayana Mejia  Clinic Number: [id number]    Therapy Diagnosis: Aftercare Rotator Cuff Repair  Physician: Tavo Villalta MD    Visit Date: 10/3/2024    Physician Orders: PT Eval and Treat  Medical Diagnosis from Referral: Rotator cuff tear Supraspinatus, SLAP lesion  Evaluation Date: 8/27/2024  Authorization Period Expiration: medically necessary  Plan of Care Expiration: 11/19/2024  Visit # / Visits authorized: visit #14    Time In: 1100  Time Out: 1228  Total Billable Time: 58 minutes    Surgery: Rotator cuff repair, Labral repair left shoulder on 8/8/2024  Orthopedic Precautions: Standard rotator cuff precautions; Labral precautions  Pertinent History: see medical chart    Subjective     Patient reports: continues with soreness but not too bad. Spent the day in [place] yesterday so was not able to do much with her pulley's at home. Icing as needed. To see surgeon on Oct 14th. Agrees to PT session.      Response to previous treatment: good    Pain: 2/10  Location: left shoulder      Outcome Measure:  Therapist reviewed FOTO scores for Dayana Mejia on 8/27/2024. FOTO documents entered into EPIC - see Media section.  FOTO filled out by: Patient    Intake: Patient's Physical FS Primary Measure: 6  Intake: Risk Adjusted Statistical FOTO: 26  Intake: Limitation Score: 94%%  Intake: Category: Carrying  Intake: DASH: 92.5% disability (higher score greater disability)    Objective     Dayana received the following treatment:     Time Activities   Manual 18 Gentle PROM of the left shoulder, gentle mobs; shoulder isometrics   TherAct     TherEx 30 pulleys, finger wall climb, wand press, wand elevations, scapular retractions, side lying ER, serratus punch, resisted triceps, prone I's, Y's,T's; prone rows   Gait     Neuro Re-ed 10 rhythmic stabilization, scapula neuro re-ed   Modalities 10/10 Moist heat pre exercise; CP post exercise   E-Stim     Dry Needling     Canalith  Repositioning       Home Exercises Provided and Patient Education Provided     Education provided:   -Plan of care, HEP, protocol and orthopedic/surgery restrictions     Written Home Exercises Provided: yes.  Exercises were reviewed and Trupti was able to demonstrate them prior to the end of the session. Trupti demonstrated good  understanding of the education provided.     See EMR under Patient Instructions for exercises provided 8/27/2024.    Assessment     Dayana is a 64 y.o. female referred to outpatient Physical Therapy with a medical diagnosis of Rotator cuff tear Supraspinatus, SLAP lesion with Labral repair. Patient presents with pain, decreased ROM with surgical restrictions, decreased strength and functional reach and use of the right arm all resulting in decreased functional capacity.     Dayana is almost 8 weeks post op. She received PROM exercises to the left shoulder and slowly we were able to push a little more PROM today. We did increase some AAROM to tolerance. Good elevation with the pulley's and finger wall climbs to about 145-155 degrees of shoulder flexion. She remains painful at end ranges. Goals remain protect tissue repair, decrease inflammation, decrease pain, postural education and PROM as instructed. Will continue the plan of care and progress per protocol.    Patient prognosis is Good.      Anticipated barriers to physical therapy: none    Goals: Dayana Is progressing well towards her goals.    Short Term Goals: 6 weeks   Patient will report at least 10% increase in functional capacity from initial QuickDash score to indicate clinically significant functional improvement  Patient will report at least 10 point increase on initial FOTO score to indicate clinically significant functional improvement  Patient will demonstrate passive right shoulder flexion, scaption, abduction to at least 130 degrees to improve functional mobility during ADLs  Patient will demonstrate passive right shoulder  external rotation to at least 45 degrees to improve functional mobility during ADLs       Long Term Goals: 12 weeks   Patient will report at least 20% increase in functional capacity from initial QuickDash score to indicate clinically significant functional improvement  Patient will report at least 20 point increase on initial FOTO score to indicate clinically significant functional improvement  Patient will demonstrate active right shoulder flexion, scaption, abduction to at least 165 degrees to improve functional mobility during ADLs  Patient will demonstrate active right shoulder external rotation and internal rotation to 70 degrees and 90 degrees respectively  Patient will demonstrate ability to return to light gardening and report 2/10 pain or less    Plan     Plan of care Certification: 8/27/2024 to 11/19/2024.     Outpatient Physical Therapy 3 times weekly for 12 weeks to include the following interventions: Manual Therapy, Moist Heat/ Ice, Neuromuscular Re-ed, Patient Education, Self Care, Therapeutic Activities, and Therapeutic Exercise.     Regulo Hernandez, PT

## 2024-10-04 ENCOUNTER — CLINICAL SUPPORT (OUTPATIENT)
Dept: REHABILITATION | Facility: HOSPITAL | Age: 64
End: 2024-10-04
Payer: COMMERCIAL

## 2024-10-04 DIAGNOSIS — Z98.890 STATUS POST LABRAL REPAIR OF SHOULDER: ICD-10-CM

## 2024-10-04 DIAGNOSIS — Z98.890 S/P LEFT ROTATOR CUFF REPAIR: Primary | ICD-10-CM

## 2024-10-04 PROCEDURE — 97140 MANUAL THERAPY 1/> REGIONS: CPT

## 2024-10-04 PROCEDURE — 97110 THERAPEUTIC EXERCISES: CPT

## 2024-10-04 NOTE — PROGRESS NOTES
Physical Therapy Treatment Note     Name: Dayana Mejia  Clinic Number: [id number]    Therapy Diagnosis: Aftercare Rotator Cuff Repair  Physician: Tavo Villalta MD    Visit Date: 10/4/2024    Physician Orders: PT Eval and Treat  Medical Diagnosis from Referral: Rotator cuff tear Supraspinatus, SLAP lesion  Evaluation Date: 8/27/2024  Authorization Period Expiration: medically necessary  Plan of Care Expiration: 11/19/2024  Visit # / Visits authorized: visit #16    Time In: 1101  Time Out: 1211  Total Billable Time: 55 minutes    Surgery: Rotator cuff repair, Labral repair left shoulder on 8/8/2024  Orthopedic Precautions: Standard rotator cuff precautions; Labral precautions  Pertinent History: see medical chart    Subjective     Patient reports: continues with soreness but not too bad. Icing as needed. To see surgeon on Oct 14th. Agrees to PT session.      Response to previous treatment: good    Pain: 2/10  Location: left shoulder      Outcome Measure:  Therapist reviewed FOTO scores for Dayana Mejia on 8/27/2024. FOTO documents entered into EPIC - see Media section.  FOTO filled out by: Patient    Intake: Patient's Physical FS Primary Measure: 6 (goal is 52 @ visit #26)  Intake: Risk Adjusted Statistical FOTO: 26  Intake: Limitation Score: 94%%  Intake: Category: Carrying  Intake: DASH: 92.5% disability (higher score greater disability)    09/1262024: Patient's Physical FS Primary Measure: 29 (goal is 52 @ visit #26)  Intake: Risk Adjusted Statistical FOTO: 26  09/26/2024: Limitation Score: 71%  09/26/2024: Category: Carrying  09/26/2024: DASH: 80.8.% disability (higher score greater disability)    Objective     Dayana received the following treatment:     Time Activities   Manual 15 Gentle PROM of the left shoulder, gentle mobs; shoulder isometrics   TherAct     TherEx 30 pulleys, finger wall climb, wand press, wand elevations, scapular retractions, side lying ER, serratus punch, resisted triceps, prone I's,  Y's,T's; prone rows, AAROM flex   Gait     Neuro Re-ed 10 rhythmic stabilization, scapula neuro re-ed   Modalities 5/10 Moist heat pre exercise; CP post exercise   E-Stim     Dry Needling     Canalith Repositioning       Home Exercises Provided and Patient Education Provided     Education provided:   -Plan of care, HEP, protocol and orthopedic/surgery restrictions     Written Home Exercises Provided: yes.  Exercises were reviewed and Trupti was able to demonstrate them prior to the end of the session. Trupti demonstrated good  understanding of the education provided.     See EMR under Patient Instructions for exercises provided 8/27/2024.    Assessment     Dayana is a 64 y.o. female referred to outpatient Physical Therapy with a medical diagnosis of Rotator cuff tear Supraspinatus, SLAP lesion with Labral repair. Patient presents with pain, decreased ROM with surgical restrictions, decreased strength and functional reach and use of the right arm all resulting in decreased functional capacity.     Dayana is almost 8 weeks post op. She received PROM exercises to the left shoulder and slowly we were able to push a little more PROM today. We did increase some AAROM to tolerance. Good elevation with the pulley's and finger wall climbs to about 145-155 degrees of shoulder flexion. She remains painful at end ranges. Goals remain protect tissue repair, decrease inflammation, decrease pain, postural education and PROM as instructed. Will continue the plan of care and progress per protocol.    Patient prognosis is Good.      Anticipated barriers to physical therapy: none    Goals: Dayana Is progressing well towards her goals.    Short Term Goals: 6 weeks   Patient will report at least 10% increase in functional capacity from initial QuickDash score to indicate clinically significant functional improvement  Patient will report at least 10 point increase on initial FOTO score to indicate clinically significant functional  improvement  Patient will demonstrate passive right shoulder flexion, scaption, abduction to at least 130 degrees to improve functional mobility during ADLs  Patient will demonstrate passive right shoulder external rotation to at least 45 degrees to improve functional mobility during ADLs       Long Term Goals: 12 weeks   Patient will report at least 20% increase in functional capacity from initial QuickDash score to indicate clinically significant functional improvement  Patient will report at least 20 point increase on initial FOTO score to indicate clinically significant functional improvement  Patient will demonstrate active right shoulder flexion, scaption, abduction to at least 165 degrees to improve functional mobility during ADLs  Patient will demonstrate active right shoulder external rotation and internal rotation to 70 degrees and 90 degrees respectively  Patient will demonstrate ability to return to light gardening and report 2/10 pain or less    Plan     Plan of care Certification: 8/27/2024 to 11/19/2024.     Outpatient Physical Therapy 3 times weekly for 12 weeks to include the following interventions: Manual Therapy, Moist Heat/ Ice, Neuromuscular Re-ed, Patient Education, Self Care, Therapeutic Activities, and Therapeutic Exercise.     Regulo Hernandez, PT

## 2024-10-10 ENCOUNTER — CLINICAL SUPPORT (OUTPATIENT)
Dept: REHABILITATION | Facility: HOSPITAL | Age: 64
End: 2024-10-10
Payer: COMMERCIAL

## 2024-10-10 DIAGNOSIS — Z98.890 STATUS POST LABRAL REPAIR OF SHOULDER: ICD-10-CM

## 2024-10-10 DIAGNOSIS — Z98.890 S/P LEFT ROTATOR CUFF REPAIR: Primary | ICD-10-CM

## 2024-10-10 PROCEDURE — 97140 MANUAL THERAPY 1/> REGIONS: CPT

## 2024-10-10 PROCEDURE — 97110 THERAPEUTIC EXERCISES: CPT

## 2024-10-10 NOTE — PROGRESS NOTES
"  Physical Therapy Treatment Note     Name: Dayana Mejia  Clinic Number: [id number]    Therapy Diagnosis: Aftercare Rotator Cuff Repair  Physician: No ref. provider found    Visit Date: 10/10/2024    Physician Orders: PT Eval and Treat  Medical Diagnosis from Referral: Rotator cuff tear Supraspinatus, SLAP lesion  Evaluation Date: 8/27/2024  Authorization Period Expiration: medically necessary  Plan of Care Expiration: 11/19/2024  Visit # / Visits authorized: visit #16    Time In: 1054  Time Out: 1144  Total Billable Time: 40 minutes    Surgery: Rotator cuff repair, Labral repair left shoulder on 8/8/2024  Orthopedic Precautions: Standard rotator cuff precautions; Labral precautions  Pertinent History: see medical chart    Subjective     Patient reports: that she is not feeling well today. Was sick all weekend after the doctor changed her blood sugar meds. Had vomiting and diarrhea and feels weak today. "I did not do any exercise this weekend." Continues with soreness but not too bad. Icing as needed. To see surgeon on Oct 14th. Agrees to PT session.      Response to previous treatment: good    Pain: 2/10  Location: left shoulder      Outcome Measure:  Therapist reviewed FOTO scores for Dayana Mejia on 8/27/2024. FOTO documents entered into EPIC - see Media section.  FOTO filled out by: Patient    Intake: Patient's Physical FS Primary Measure: 6 (goal is 52 @ visit #26)  Intake: Risk Adjusted Statistical FOTO: 26  Intake: Limitation Score: 94%%  Intake: Category: Carrying  Intake: DASH: 92.5% disability (higher score greater disability)    09/1262024: Patient's Physical FS Primary Measure: 29 (goal is 52 @ visit #26)  Intake: Risk Adjusted Statistical FOTO: 26  09/26/2024: Limitation Score: 71%  09/26/2024: Category: Carrying  09/26/2024: DASH: 80.8.% disability (higher score greater disability)    Objective     Dayana received the following treatment:     Time Activities   Manual 10 Gentle PROM of the left shoulder, gentle " mobs; shoulder isometrics   TherAct     TherEx 30 pulleys, finger wall climb, wand press, wand elevations, scapular retractions, side lying ER, serratus punch, resisted triceps, prone I's, Y's,T's; prone rows   Gait     Neuro Re-ed Not done today rhythmic stabilization, scapula neuro re-ed   Modalities 10 Moist heat pre exercise   E-Stim     Dry Needling     Canalith Repositioning       Home Exercises Provided and Patient Education Provided     Education provided:   -Plan of care, HEP, protocol and orthopedic/surgery restrictions     Written Home Exercises Provided: yes.  Exercises were reviewed and Trupti was able to demonstrate them prior to the end of the session. Trupti demonstrated good  understanding of the education provided.     See EMR under Patient Instructions for exercises provided 8/27/2024.    Assessment     Dayana is a 64 y.o. female referred to outpatient Physical Therapy with a medical diagnosis of Rotator cuff tear Supraspinatus, SLAP lesion with Labral repair. Patient presents with pain, decreased ROM with surgical restrictions, decreased strength and functional reach and use of the right arm all resulting in decreased functional capacity.     Dayana's session was cut short today per her request due to feeling poorly. She did received PROM exercises to the left shoulder and slowly we were able to push a little more PROM today. Continued with some AAROM to tolerance. Good elevation with the pulley's and finger wall climbs to about 145-155 degrees of shoulder flexion. She remains painful at end ranges. Goals remain protect tissue repair, decrease inflammation, decrease pain, postural education and PROM as instructed. Will continue the plan of care and progress per protocol.    Patient prognosis is Good.      Anticipated barriers to physical therapy: none    Goals: Dayana Is progressing well towards her goals.    Short Term Goals: 6 weeks   Patient will report at least 10% increase in functional  capacity from initial QuickDash score to indicate clinically significant functional improvement  Patient will report at least 10 point increase on initial FOTO score to indicate clinically significant functional improvement  Patient will demonstrate passive right shoulder flexion, scaption, abduction to at least 130 degrees to improve functional mobility during ADLs  Patient will demonstrate passive right shoulder external rotation to at least 45 degrees to improve functional mobility during ADLs       Long Term Goals: 12 weeks   Patient will report at least 20% increase in functional capacity from initial QuickDash score to indicate clinically significant functional improvement  Patient will report at least 20 point increase on initial FOTO score to indicate clinically significant functional improvement  Patient will demonstrate active right shoulder flexion, scaption, abduction to at least 165 degrees to improve functional mobility during ADLs  Patient will demonstrate active right shoulder external rotation and internal rotation to 70 degrees and 90 degrees respectively  Patient will demonstrate ability to return to light gardening and report 2/10 pain or less    Plan     Plan of care Certification: 8/27/2024 to 11/19/2024.     Outpatient Physical Therapy 3 times weekly for 12 weeks to include the following interventions: Manual Therapy, Moist Heat/ Ice, Neuromuscular Re-ed, Patient Education, Self Care, Therapeutic Activities, and Therapeutic Exercise.     Regulo Hernandez, PT

## 2024-10-15 ENCOUNTER — CLINICAL SUPPORT (OUTPATIENT)
Dept: REHABILITATION | Facility: HOSPITAL | Age: 64
End: 2024-10-15
Payer: COMMERCIAL

## 2024-10-15 DIAGNOSIS — Z98.890 STATUS POST LABRAL REPAIR OF SHOULDER: ICD-10-CM

## 2024-10-15 DIAGNOSIS — Z98.890 S/P LEFT ROTATOR CUFF REPAIR: Primary | ICD-10-CM

## 2024-10-15 PROCEDURE — 97140 MANUAL THERAPY 1/> REGIONS: CPT

## 2024-10-15 PROCEDURE — 97110 THERAPEUTIC EXERCISES: CPT

## 2024-10-15 NOTE — PROGRESS NOTES
"  Physical Therapy Treatment Note     Name: Dayana Mejia  Clinic Number: [id number]    Therapy Diagnosis: Aftercare Rotator Cuff Repair  Physician: Tavo Villalta MD    Visit Date: 10/15/2024    Physician Orders: PT Eval and Treat  Medical Diagnosis from Referral: Rotator cuff tear Supraspinatus, SLAP lesion  Evaluation Date: 8/27/2024  Authorization Period Expiration: medically necessary  Plan of Care Expiration: 11/19/2024  Visit # / Visits authorized: visit #18    Time In: 1101  Time Out: 1214  Total Billable Time: 53 minutes    Surgery: Rotator cuff repair, Labral repair left shoulder on 8/8/2024  Orthopedic Precautions: Standard rotator cuff precautions; Labral precautions  Pertinent History: see medical chart    Subjective     Patient reports: that she is feeling better but "not great."  States that she has not been exercising much due to feeling too poorly. To see surgeon on Oct 25th. Agrees to PT session.      Response to previous treatment: good    Pain: 4/10  Location: left shoulder      Outcome Measure:  Therapist reviewed FOTO scores for Dayana Mejia on 8/27/2024. FOTO documents entered into EPIC - see Media section.  FOTO filled out by: Patient    Intake: Patient's Physical FS Primary Measure: 6 (goal is 52 @ visit #26)  Intake: Risk Adjusted Statistical FOTO: 26  Intake: Limitation Score: 94%  Intake: Category: Carrying  Intake: DASH: 92.5% disability (higher score greater disability)    09/1262024: Patient's Physical FS Primary Measure: 29 (goal is 52 @ visit #26)  Intake: Risk Adjusted Statistical FOTO: 26 09/26/2024: Limitation Score: 71%  09/26/2024: Category: Carrying  09/26/2024: DASH: 80.8.% disability (higher score greater disability)    10/15/2024: Patient's Physical FS Primary Measure: 45 (goal is 52 @ visit #26)  Intake: Risk Adjusted Statistical FOTO: 26  10/15/2024:  Limitation Score: 55%  10/15/2024:  Category: Carrying  10/15/2024:  DASH: 55.0.% disability (higher score greater " disability)    Objective     Dayana received the following treatment:     Time Activities   Manual 18 Gentle PROM of the left shoulder, mobs; shoulder isometrics   TherAct     TherEx 35 pulleys, finger wall climb flx/abd, wand press, wand elevations, wand IR stretch, scapular retractions, side lying ER, serratus punch, resisted triceps, prone I's, Y's,T's; prone rows, prone ext, active flex and scap to tolerance   Gait     Neuro Re-ed Not done today rhythmic stabilization, scapula neuro re-ed   Modalities 10/10 Moist heat pre exercise, CP post exercise   E-Stim     Dry Needling     Canalith Repositioning       Home Exercises Provided and Patient Education Provided     Education provided:   -Plan of care, HEP, protocol and orthopedic/surgery restrictions     Written Home Exercises Provided: yes.  Exercises were reviewed and Trupti was able to demonstrate them prior to the end of the session. Trupti demonstrated good  understanding of the education provided.     See EMR under Patient Instructions for exercises provided 8/27/2024.    Assessment     Dayana is a 64 y.o. female referred to outpatient Physical Therapy with a medical diagnosis of Rotator cuff tear Supraspinatus, SLAP lesion with Labral repair. Patient presents with pain, decreased ROM with surgical restrictions, decreased strength and functional reach and use of the right arm all resulting in decreased functional capacity.     Overall Dayana is improving evident by her score on the functional outcome tool going from 94% to 55% disability. Today her shoulder was tight today a sign she has not been doing her exercise. We stressed the importance of stretching at home, to really use her pulley's. We continued with PROM exercises to the left shoulder and slowly we were able to push a little more PROM today. Continued with some AAROM and activeto tolerance. Good elevation with the pulley's and finger wall climbs to about 145-155 degrees of shoulder flexion. She  remains painful at end ranges. Goals remain protect tissue repair, decrease inflammation, decrease pain, postural education and PROM as instructed. Will continue the plan of care and progress per protocol. Will push ROM.    Patient prognosis is Good.      Anticipated barriers to physical therapy: none    Goals: Dayana Is progressing well towards her goals.    Short Term Goals: 6 weeks   Patient will report at least 10% increase in functional capacity from initial QuickDash score to indicate clinically significant functional improvement  Patient will report at least 10 point increase on initial FOTO score to indicate clinically significant functional improvement  Patient will demonstrate passive right shoulder flexion, scaption, abduction to at least 130 degrees to improve functional mobility during ADLs  Patient will demonstrate passive right shoulder external rotation to at least 45 degrees to improve functional mobility during ADLs       Long Term Goals: 12 weeks   Patient will report at least 20% increase in functional capacity from initial QuickDash score to indicate clinically significant functional improvement  Patient will report at least 20 point increase on initial FOTO score to indicate clinically significant functional improvement  Patient will demonstrate active right shoulder flexion, scaption, abduction to at least 165 degrees to improve functional mobility during ADLs  Patient will demonstrate active right shoulder external rotation and internal rotation to 70 degrees and 90 degrees respectively  Patient will demonstrate ability to return to light gardening and report 2/10 pain or less    Plan     Plan of care Certification: 8/27/2024 to 11/19/2024.     Outpatient Physical Therapy 3 times weekly for 12 weeks to include the following interventions: Manual Therapy, Moist Heat/ Ice, Neuromuscular Re-ed, Patient Education, Self Care, Therapeutic Activities, and Therapeutic Exercise.     Regulo Hernandez, PT

## 2024-10-17 ENCOUNTER — CLINICAL SUPPORT (OUTPATIENT)
Dept: REHABILITATION | Facility: HOSPITAL | Age: 64
End: 2024-10-17
Payer: COMMERCIAL

## 2024-10-17 DIAGNOSIS — Z98.890 STATUS POST LABRAL REPAIR OF SHOULDER: ICD-10-CM

## 2024-10-17 DIAGNOSIS — Z98.890 S/P LEFT ROTATOR CUFF REPAIR: Primary | ICD-10-CM

## 2024-10-17 PROCEDURE — 97140 MANUAL THERAPY 1/> REGIONS: CPT

## 2024-10-17 PROCEDURE — 97110 THERAPEUTIC EXERCISES: CPT

## 2024-10-17 NOTE — PROGRESS NOTES
Physical Therapy Treatment Note     Name: Dayana Mejia  Clinic Number: [id number]    Therapy Diagnosis: Aftercare Rotator Cuff Repair  Physician: Tavo Villalta MD    Visit Date: 10/17/2024    Physician Orders: PT Eval and Treat  Medical Diagnosis from Referral: Rotator cuff tear Supraspinatus, SLAP lesion  Evaluation Date: 8/27/2024  Authorization Period Expiration: medically necessary  Plan of Care Expiration: 11/19/2024  Visit # / Visits authorized: visit #19    Time In: 1054  Time Out: 1207  Total Billable Time: 53 minutes    Surgery: Rotator cuff repair, Labral repair left shoulder on 8/8/2024  Orthopedic Precautions: Standard rotator cuff precautions; Labral precautions  Pertinent History: see medical chart    Subjective     Patient reports: that the shoulder is feeling better but not getting sleep. States that she has insomnia and her meds are not helping. To see surgeon on Oct 25th. Agrees to PT session.      Response to previous treatment: good    Pain: 4/10  Location: left shoulder      Outcome Measure:  Therapist reviewed FOTO scores for Dayana Mejia on 8/27/2024. FOTO documents entered into EPIC - see Media section.  FOTO filled out by: Patient    Intake: Patient's Physical FS Primary Measure: 6 (goal is 52 @ visit #26)  Intake: Risk Adjusted Statistical FOTO: 26  Intake: Limitation Score: 94%  Intake: Category: Carrying  Intake: DASH: 92.5% disability (higher score greater disability)    09/1262024: Patient's Physical FS Primary Measure: 29 (goal is 52 @ visit #26)  Intake: Risk Adjusted Statistical FOTO: 26 09/26/2024: Limitation Score: 71%  09/26/2024: Category: Carrying  09/26/2024: DASH: 80.8.% disability (higher score greater disability)    10/15/2024: Patient's Physical FS Primary Measure: 45 (goal is 52 @ visit #26)  Intake: Risk Adjusted Statistical FOTO: 26  10/15/2024:  Limitation Score: 55%  10/15/2024:  Category: Carrying  10/15/2024:  DASH: 55.0.% disability (higher score greater  disability)    Objective     Dayana received the following treatment:     Time Activities   Manual 15 PROM of the left shoulder flx, abd, ER, mobs; shoulder isometrics   TherAct     TherEx 38 pulleys, finger wall climb flx/abd, wand press, wand elevations, wand IR stretch, scapular retractions, side lying ER, serratus punch, resisted triceps, prone I's, Y's,T's; prone rows, prone ext, active flex and scap to tolerance   Gait     Neuro Re-ed Not done today rhythmic stabilization, scapula neuro re-ed   Modalities 10/10 Moist heat pre exercise, CP post exercise   E-Stim     Dry Needling     Canalith Repositioning       Home Exercises Provided and Patient Education Provided     Education provided:   -Plan of care, HEP, protocol and orthopedic/surgery restrictions     Written Home Exercises Provided: yes.  Exercises were reviewed and Trupti was able to demonstrate them prior to the end of the session. Trupti demonstrated good  understanding of the education provided.     See EMR under Patient Instructions for exercises provided 8/27/2024.    Assessment     Dayana is a 64 y.o. female referred to outpatient Physical Therapy with a medical diagnosis of Rotator cuff tear Supraspinatus, SLAP lesion with Labral repair. Patient presents with pain, decreased ROM with surgical restrictions, decreased strength and functional reach and use of the right arm all resulting in decreased functional capacity.     Overall Dayana is improving evident by her score on the functional outcome tool going from 94% to 55% disability. Today her shoulder was tight today a sign she has not been doing her exercise. We stressed the importance of stretching at home, to really use her pulley's. We continued with PROM exercises to the left shoulder and slowly we were able to push a little more PROM today. Continued with some AAROM and activeto tolerance. Good elevation with the pulley's and finger wall climbs to about 160-165 degrees of shoulder flexion.  She remains painful at end ranges but improving.  Will continue the plan of care and progress per protocol. Will push ROM as tolerated..    Patient prognosis is Good.      Anticipated barriers to physical therapy: none    Goals: Dayana Is progressing well towards her goals.    Short Term Goals: 6 weeks   Patient will report at least 10% increase in functional capacity from initial QuickDash score to indicate clinically significant functional improvement  Patient will report at least 10 point increase on initial FOTO score to indicate clinically significant functional improvement  Patient will demonstrate passive right shoulder flexion, scaption, abduction to at least 130 degrees to improve functional mobility during ADLs  Patient will demonstrate passive right shoulder external rotation to at least 45 degrees to improve functional mobility during ADLs       Long Term Goals: 12 weeks   Patient will report at least 20% increase in functional capacity from initial QuickDash score to indicate clinically significant functional improvement  Patient will report at least 20 point increase on initial FOTO score to indicate clinically significant functional improvement  Patient will demonstrate active right shoulder flexion, scaption, abduction to at least 165 degrees to improve functional mobility during ADLs  Patient will demonstrate active right shoulder external rotation and internal rotation to 70 degrees and 90 degrees respectively  Patient will demonstrate ability to return to light gardening and report 2/10 pain or less    Plan     Plan of care Certification: 8/27/2024 to 11/19/2024.     Outpatient Physical Therapy 3 times weekly for 12 weeks to include the following interventions: Manual Therapy, Moist Heat/ Ice, Neuromuscular Re-ed, Patient Education, Self Care, Therapeutic Activities, and Therapeutic Exercise.     Regulo Hernandez, PT

## 2024-10-18 ENCOUNTER — CLINICAL SUPPORT (OUTPATIENT)
Dept: REHABILITATION | Facility: HOSPITAL | Age: 64
End: 2024-10-18
Payer: COMMERCIAL

## 2024-10-18 DIAGNOSIS — Z98.890 S/P LEFT ROTATOR CUFF REPAIR: Primary | ICD-10-CM

## 2024-10-18 DIAGNOSIS — Z98.890 STATUS POST LABRAL REPAIR OF SHOULDER: ICD-10-CM

## 2024-10-18 PROCEDURE — 97140 MANUAL THERAPY 1/> REGIONS: CPT

## 2024-10-18 PROCEDURE — 97110 THERAPEUTIC EXERCISES: CPT

## 2024-10-18 NOTE — PROGRESS NOTES
Physical Therapy Treatment Note     Name: Dayana Mejia  Clinic Number: [id number]    Therapy Diagnosis: Aftercare Rotator Cuff Repair  Physician: Tavo Villalta MD    Visit Date: 10/18/2024    Physician Orders: PT Eval and Treat  Medical Diagnosis from Referral: Rotator cuff tear Supraspinatus, SLAP lesion  Evaluation Date: 8/27/2024  Authorization Period Expiration: medically necessary  Plan of Care Expiration: 11/19/2024  Visit # / Visits authorized: visit #20    Time In: 1057  Time Out: 1200  Total Billable Time: 53 minutes    Surgery: Rotator cuff repair, Labral repair left shoulder on 8/8/2024  Orthopedic Precautions: Standard rotator cuff precautions; Labral precautions  Pertinent History: see medical chart    Subjective     Patient reports: that she got some good sleep last night so she feels much better. To see surgeon on Oct 25th. Agrees to PT session.      Response to previous treatment: good    Pain: 2/10  Location: left shoulder      Outcome Measure:  Therapist reviewed FOTO scores for Dayana Mejia on 8/27/2024. FOTO documents entered into EPIC - see Media section.  FOTO filled out by: Patient    Intake: Patient's Physical FS Primary Measure: 6 (goal is 52 @ visit #26)  Intake: Risk Adjusted Statistical FOTO: 26  Intake: Limitation Score: 94%  Intake: Category: Carrying  Intake: DASH: 92.5% disability (higher score greater disability)    09/1262024: Patient's Physical FS Primary Measure: 29 (goal is 52 @ visit #26)  Intake: Risk Adjusted Statistical FOTO: 26  09/26/2024: Limitation Score: 71%  09/26/2024: Category: Carrying  09/26/2024: DASH: 80.8.% disability (higher score greater disability)    10/15/2024: Patient's Physical FS Primary Measure: 45 (goal is 52 @ visit #26)  Intake: Risk Adjusted Statistical FOTO: 26  10/15/2024:  Limitation Score: 55%  10/15/2024:  Category: Carrying  10/15/2024:  DASH: 55.0.% disability (higher score greater disability)    Riley Anne received the following  treatment:     Time Activities   Manual 15 PROM of the left shoulder flx, abd, ER, mobs; shoulder isometrics   TherAct     TherEx 38 pulleys, finger wall climb flx/abd, wand press, wand elevations, wand IR stretch, scapular retractions, side lying ER, serratus punch, resisted triceps, prone I's, Y's,T's; prone rows, prone ext, active flex and scap to tolerance   Gait     Neuro Re-ed Not done today rhythmic stabilization, scapula neuro re-ed   Modalities 10/10 Moist heat pre exercise, CP post exercise   E-Stim     Dry Needling     Canalith Repositioning       Home Exercises Provided and Patient Education Provided     Education provided:   -Plan of care, HEP, protocol and orthopedic/surgery restrictions     Written Home Exercises Provided: yes.  Exercises were reviewed and Trupti was able to demonstrate them prior to the end of the session. Trupti demonstrated good  understanding of the education provided.     See EMR under Patient Instructions for exercises provided 8/27/2024.    Assessment     Dayana is a 64 y.o. female referred to outpatient Physical Therapy with a medical diagnosis of Rotator cuff tear Supraspinatus, SLAP lesion with Labral repair. Patient presents with pain, decreased ROM with surgical restrictions, decreased strength and functional reach and use of the right arm all resulting in decreased functional capacity.     Overall Dayana is improving evident by her score on the functional outcome tool going from 94% to 55% disability. Today her shoulder was tight today a sign she has not been doing her exercise. We stressed the importance of stretching at home, to really use her pulley's. We continued with PROM exercises to the left shoulder and slowly we were able to push a little more PROM today. Continued with some AAROM and activeto tolerance. Good elevation with the pulley's and finger wall climbs to about 165-170 degrees of shoulder flexion. Abduction to about 150 degrees, ER is 70 degrees. She is  painful at end ranges but improving.  Will continue the plan of care and progress per protocol. Will push ROM as tolerated.    Patient prognosis is Good.      Anticipated barriers to physical therapy: none    Goals: Dayana Is progressing well towards her goals.    Short Term Goals: 6 weeks   Patient will report at least 10% increase in functional capacity from initial QuickDash score to indicate clinically significant functional improvement  Patient will report at least 10 point increase on initial FOTO score to indicate clinically significant functional improvement  Patient will demonstrate passive right shoulder flexion, scaption, abduction to at least 130 degrees to improve functional mobility during ADLs  Patient will demonstrate passive right shoulder external rotation to at least 45 degrees to improve functional mobility during ADLs       Long Term Goals: 12 weeks   Patient will report at least 20% increase in functional capacity from initial QuickDash score to indicate clinically significant functional improvement  Patient will report at least 20 point increase on initial FOTO score to indicate clinically significant functional improvement  Patient will demonstrate active right shoulder flexion, scaption, abduction to at least 165 degrees to improve functional mobility during ADLs  Patient will demonstrate active right shoulder external rotation and internal rotation to 70 degrees and 90 degrees respectively  Patient will demonstrate ability to return to light gardening and report 2/10 pain or less    Plan     Plan of care Certification: 8/27/2024 to 11/19/2024.     Outpatient Physical Therapy 3 times weekly for 12 weeks to include the following interventions: Manual Therapy, Moist Heat/ Ice, Neuromuscular Re-ed, Patient Education, Self Care, Therapeutic Activities, and Therapeutic Exercise.     Regulo Hernandez, PT

## 2024-10-22 ENCOUNTER — CLINICAL SUPPORT (OUTPATIENT)
Dept: REHABILITATION | Facility: HOSPITAL | Age: 64
End: 2024-10-22
Payer: COMMERCIAL

## 2024-10-22 DIAGNOSIS — Z98.890 STATUS POST LABRAL REPAIR OF SHOULDER: ICD-10-CM

## 2024-10-22 DIAGNOSIS — Z98.890 S/P LEFT ROTATOR CUFF REPAIR: Primary | ICD-10-CM

## 2024-10-22 PROCEDURE — 97110 THERAPEUTIC EXERCISES: CPT

## 2024-10-22 PROCEDURE — 97140 MANUAL THERAPY 1/> REGIONS: CPT

## 2024-10-22 NOTE — PROGRESS NOTES
"  Physical Therapy Treatment Note     Name: Dayana Mejia  Clinic Number: [id number]    Therapy Diagnosis: Aftercare Rotator Cuff Repair  Physician: Tavo Villalta MD    Visit Date: 10/22/2024    Physician Orders: PT Eval and Treat  Medical Diagnosis from Referral: Rotator cuff tear Supraspinatus, SLAP lesion  Evaluation Date: 8/27/2024  Authorization Period Expiration: medically necessary  Plan of Care Expiration: 11/19/2024  Visit # / Visits authorized: visit #21    Time In: 1053  Time Out: 1213  Total Billable Time: 53 minutes    Surgery: Rotator cuff repair, Labral repair left shoulder on 8/8/2024  Orthopedic Precautions: Standard rotator cuff precautions; Labral precautions  Pertinent History: see medical chart    Subjective     Patient reports: that she has soreness "in the joint." Using her pulley's at home and will do it til it hurts.  To see surgeon on Oct 25th. Agrees to PT session.      Response to previous treatment: good    Pain: 3/10  Location: left shoulder      Outcome Measure:  Therapist reviewed FOTO scores for Dayana Mejia on 8/27/2024. FOTO documents entered into EP[employer] - see Media section.  FOTO filled out by: Patient    Intake: Patient's Physical FS Primary Measure: 6 (goal is 52 @ visit #26)  Intake: Risk Adjusted Statistical FOTO: 26  Intake: Limitation Score: 94%  Intake: Category: Carrying  Intake: DASH: 92.5% disability (higher score greater disability)    09/1262024: Patient's Physical FS Primary Measure: 29 (goal is 52 @ visit #26)  Intake: Risk Adjusted Statistical FOTO: 26 09/26/2024: Limitation Score: 71%  09/26/2024: Category: Carrying  09/26/2024: DASH: 80.8.% disability (higher score greater disability)    10/15/2024: Patient's Physical FS Primary Measure: 45 (goal is 52 @ visit #26)  Intake: Risk Adjusted Statistical FOTO: 26  10/15/2024:  Limitation Score: 55%  10/15/2024:  Category: Carrying  10/15/2024:  DASH: 55.0.% disability (higher score greater disability)    Objective "     Dayana received the following treatment:     Time Activities   Manual 15 PROM of the left shoulder flx, abd, ER, mobs; shoulder isometrics   TherAct     TherEx 38 pulleys, finger wall climb flx/abd, wand press, wand elevations, wand IR stretch, scapular retractions, side lying ER, serratus punch, resisted triceps, prone I's, Y's,T's; prone rows, prone ext, active flex and scap to tolerance   Gait     Neuro Re-ed Not done today rhythmic stabilization, scapula neuro re-ed   Modalities 10/10 Moist heat pre exercise, CP post exercise   E-Stim     Dry Needling     Canalith Repositioning       Home Exercises Provided and Patient Education Provided     Education provided:   -Plan of care, HEP, protocol and orthopedic/surgery restrictions     Written Home Exercises Provided: yes.  Exercises were reviewed and Trupti was able to demonstrate them prior to the end of the session. Trupti demonstrated good  understanding of the education provided.     See EMR under Patient Instructions for exercises provided 8/27/2024.    Assessment     Dayana is a 64 y.o. female referred to outpatient Physical Therapy with a medical diagnosis of Rotator cuff tear Supraspinatus, SLAP lesion with Labral repair. Patient presents with pain, decreased ROM with surgical restrictions, decreased strength and functional reach and use of the right arm all resulting in decreased functional capacity.     Overall Dayana is improving evident by her score on the functional outcome tool going from 94% to 55% disability. Today her shoulder was tight today a sign she has not been doing her exercise. We stressed the importance of stretching at home, to really use her pulley's. We continued with PROM exercises to the left shoulder and slowly we were able to push a little more PROM today but she does not let you push much. Continued with some AAROM and AROM to tolerance. Good elevation with the pulley's and finger wall climbs to about 160 degrees of shoulder  flexion today. Abduction to about 140-145 degrees, ER is 70 degrees. She is painful at end ranges but improving.  Will continue the plan of care and progress per protocol. Will push ROM as tolerated.    Patient prognosis is Good.      Anticipated barriers to physical therapy: none    Goals: Dayana Is progressing well towards her goals.    Short Term Goals: 6 weeks   Patient will report at least 10% increase in functional capacity from initial QuickDash score to indicate clinically significant functional improvement  Patient will report at least 10 point increase on initial FOTO score to indicate clinically significant functional improvement  Patient will demonstrate passive right shoulder flexion, scaption, abduction to at least 130 degrees to improve functional mobility during ADLs  Patient will demonstrate passive right shoulder external rotation to at least 45 degrees to improve functional mobility during ADLs       Long Term Goals: 12 weeks   Patient will report at least 20% increase in functional capacity from initial QuickDash score to indicate clinically significant functional improvement  Patient will report at least 20 point increase on initial FOTO score to indicate clinically significant functional improvement  Patient will demonstrate active right shoulder flexion, scaption, abduction to at least 165 degrees to improve functional mobility during ADLs  Patient will demonstrate active right shoulder external rotation and internal rotation to 70 degrees and 90 degrees respectively  Patient will demonstrate ability to return to light gardening and report 2/10 pain or less    Plan     Plan of care Certification: 8/27/2024 to 11/19/2024.     Outpatient Physical Therapy 3 times weekly for 12 weeks to include the following interventions: Manual Therapy, Moist Heat/ Ice, Neuromuscular Re-ed, Patient Education, Self Care, Therapeutic Activities, and Therapeutic Exercise.     Regulo Hernandez, PT

## 2024-10-24 ENCOUNTER — CLINICAL SUPPORT (OUTPATIENT)
Dept: REHABILITATION | Facility: HOSPITAL | Age: 64
End: 2024-10-24
Payer: COMMERCIAL

## 2024-10-24 DIAGNOSIS — Z98.890 STATUS POST LABRAL REPAIR OF SHOULDER: ICD-10-CM

## 2024-10-24 DIAGNOSIS — Z98.890 S/P LEFT ROTATOR CUFF REPAIR: Primary | ICD-10-CM

## 2024-10-24 PROCEDURE — 97140 MANUAL THERAPY 1/> REGIONS: CPT

## 2024-10-24 PROCEDURE — 97110 THERAPEUTIC EXERCISES: CPT

## 2024-10-24 NOTE — PROGRESS NOTES
Physical Therapy Treatment Note     Name: Dayana Mejia  Clinic Number: [id number]    Therapy Diagnosis: Aftercare Rotator Cuff Repair  Physician: Tavo Villalta MD    Visit Date: 10/24/2024    Physician Orders: PT Eval and Treat  Medical Diagnosis from Referral: Rotator cuff tear Supraspinatus, SLAP lesion  Evaluation Date: 8/27/2024  Authorization Period Expiration: medically necessary  Plan of Care Expiration: 11/19/2024  Visit # / Visits authorized: visit #22    Time In: 1054  Time Out: 1214  Total Billable Time: 53 minutes    Surgery: Rotator cuff repair, Labral repair left shoulder on 8/8/2024  Orthopedic Precautions: Standard rotator cuff precautions; Labral precautions  Pertinent History: see medical chart    Subjective     Patient reports: that she has soreness but feels she is doing well. She is using the arm more and doing things around the house  To see her surgeon tomorrow. Agrees to PT session. Wants to decrease sessions to 2x week.    Response to previous treatment: good    Pain: 3/10  Location: left shoulder      Outcome Measure:  Therapist reviewed FOTO scores for Dayana Mejia on 8/27/2024. FOTO documents entered into EPIC - see Media section.  FOTO filled out by: Patient    Intake: Patient's Physical FS Primary Measure: 6 (goal is 52 @ visit #26)  Intake: Risk Adjusted Statistical FOTO: 26  Intake: Limitation Score: 94%  Intake: Category: Carrying  Intake: DASH: 92.5% disability (higher score greater disability)    09/1262024: Patient's Physical FS Primary Measure: 29 (goal is 52 @ visit #26)  Intake: Risk Adjusted Statistical FOTO: 26 09/26/2024: Limitation Score: 71%  09/26/2024: Category: Carrying  09/26/2024: DASH: 80.8.% disability (higher score greater disability)    10/15/2024: Patient's Physical FS Primary Measure: 45 (goal is 52 @ visit #26)  Intake: Risk Adjusted Statistical FOTO: 26  10/15/2024:  Limitation Score: 55%  10/15/2024:  Category: Carrying  10/15/2024:  DASH: 55.0.%  disability (higher score greater disability)    Objective     Dayana received the following treatment:     Time Activities   Manual 15 PROM of the left shoulder flx, abd, ER, mobs; shoulder isometrics   TherAct     TherEx 38 pulleys, finger wall climb flx/abd, wand press, wand elevations, wand IR stretch, scapular retractions, side lying ER, serratus punch, resisted triceps, prone I's, Y's,T's; prone rows, prone ext, active flex and scap to tolerance   Gait     Neuro Re-ed Not done today rhythmic stabilization, scapula neuro re-ed   Modalities 10/10 Moist heat pre exercise, CP post exercise   E-Stim     Dry Needling     Canalith Repositioning       Home Exercises Provided and Patient Education Provided     Education provided:   -Plan of care, HEP, protocol and orthopedic/surgery restrictions     Written Home Exercises Provided: yes.  Exercises were reviewed and Trupti was able to demonstrate them prior to the end of the session. Trupti demonstrated good  understanding of the education provided.     See EMR under Patient Instructions for exercises provided 8/27/2024.    Assessment     Dayana is a 64 y.o. female referred to outpatient Physical Therapy with a medical diagnosis of Rotator cuff tear Supraspinatus, SLAP lesion with Labral repair. Patient presents with pain, decreased ROM with surgical restrictions, decreased strength and functional reach and use of the right arm all resulting in decreased functional capacity.     Overall Dayana is improving evident by her score on the functional outcome tool going from 94% to 55% disability. Today her shoulder was tight today a sign she has not been doing her exercise. We stressed the importance of stretching at home, to really use her pulley's. We continued with PROM exercises to the left shoulder and slowly we were able to push a little more PROM today but she does not let you push much. Continued with some AAROM and AROM to tolerance. Good elevation with the pulley's and  finger wall climbs to about 160 degrees of shoulder flexion today. Abduction to about 140-145 degrees, ER is 70 degrees. She is painful at end ranges but improving. Will continue the plan of care and progress per protocol. Will push ROM as tolerated. Decreasing to 2x week seems reasonable but will check with her doctor.    Patient prognosis is Good.      Anticipated barriers to physical therapy: none    Goals: Dayana Is progressing well towards her goals.    Short Term Goals: 6 weeks   Patient will report at least 10% increase in functional capacity from initial QuickDash score to indicate clinically significant functional improvement  Patient will report at least 10 point increase on initial FOTO score to indicate clinically significant functional improvement  Patient will demonstrate passive right shoulder flexion, scaption, abduction to at least 130 degrees to improve functional mobility during ADLs  Patient will demonstrate passive right shoulder external rotation to at least 45 degrees to improve functional mobility during ADLs       Long Term Goals: 12 weeks   Patient will report at least 20% increase in functional capacity from initial QuickDash score to indicate clinically significant functional improvement  Patient will report at least 20 point increase on initial FOTO score to indicate clinically significant functional improvement  Patient will demonstrate active right shoulder flexion, scaption, abduction to at least 165 degrees to improve functional mobility during ADLs  Patient will demonstrate active right shoulder external rotation and internal rotation to 70 degrees and 90 degrees respectively  Patient will demonstrate ability to return to light gardening and report 2/10 pain or less    Plan     Plan of care Certification: 8/27/2024 to 11/19/2024.     Outpatient Physical Therapy 3 times weekly for 12 weeks to include the following interventions: Manual Therapy, Moist Heat/ Ice, Neuromuscular Re-ed,  Patient Education, Self Care, Therapeutic Activities, and Therapeutic Exercise.     Regulo Hernandez, PT

## 2024-10-25 PROBLEM — M75.102 ROTATOR CUFF TEAR, LEFT: Chronic | Status: ACTIVE | Noted: 2024-08-08

## 2024-10-29 ENCOUNTER — CLINICAL SUPPORT (OUTPATIENT)
Dept: REHABILITATION | Facility: HOSPITAL | Age: 64
End: 2024-10-29
Payer: COMMERCIAL

## 2024-10-29 DIAGNOSIS — Z98.890 STATUS POST LABRAL REPAIR OF SHOULDER: ICD-10-CM

## 2024-10-29 DIAGNOSIS — Z98.890 S/P LEFT ROTATOR CUFF REPAIR: Primary | ICD-10-CM

## 2024-10-29 PROCEDURE — 97140 MANUAL THERAPY 1/> REGIONS: CPT

## 2024-10-29 PROCEDURE — 97110 THERAPEUTIC EXERCISES: CPT

## 2024-11-01 ENCOUNTER — CLINICAL SUPPORT (OUTPATIENT)
Dept: REHABILITATION | Facility: HOSPITAL | Age: 64
End: 2024-11-01
Payer: COMMERCIAL

## 2024-11-01 ENCOUNTER — LAB VISIT (OUTPATIENT)
Dept: LAB | Facility: HOSPITAL | Age: 64
End: 2024-11-01
Attending: FAMILY MEDICINE
Payer: COMMERCIAL

## 2024-11-01 DIAGNOSIS — I35.0 AORTIC VALVE STENOSIS, ETIOLOGY OF CARDIAC VALVE DISEASE UNSPECIFIED: ICD-10-CM

## 2024-11-01 DIAGNOSIS — Z98.890 S/P LEFT ROTATOR CUFF REPAIR: Primary | ICD-10-CM

## 2024-11-01 DIAGNOSIS — I10 HYPERTENSION, UNSPECIFIED TYPE: ICD-10-CM

## 2024-11-01 DIAGNOSIS — E78.2 MIXED HYPERLIPIDEMIA: ICD-10-CM

## 2024-11-01 DIAGNOSIS — Z98.890 STATUS POST LABRAL REPAIR OF SHOULDER: ICD-10-CM

## 2024-11-01 DIAGNOSIS — E11.9 DM (DIABETES MELLITUS): Primary | ICD-10-CM

## 2024-11-01 LAB
ALBUMIN SERPL-MCNC: 3.6 G/DL (ref 3.4–4.8)
ALBUMIN/GLOB SERPL: 1.3 RATIO (ref 1.1–2)
ALP SERPL-CCNC: 67 UNIT/L (ref 40–150)
ALT SERPL-CCNC: 17 UNIT/L (ref 0–55)
ANION GAP SERPL CALC-SCNC: 8 MEQ/L
AST SERPL-CCNC: 14 UNIT/L (ref 5–34)
BASOPHILS # BLD AUTO: 0.05 X10(3)/MCL
BASOPHILS NFR BLD AUTO: 0.5 %
BILIRUB SERPL-MCNC: 0.2 MG/DL
BILIRUB UR QL STRIP.AUTO: NEGATIVE
BNP BLD-MCNC: 20.2 PG/ML
BUN SERPL-MCNC: 14 MG/DL (ref 9.8–20.1)
CALCIUM SERPL-MCNC: 9.8 MG/DL (ref 8.4–10.2)
CHLORIDE SERPL-SCNC: 109 MMOL/L (ref 98–107)
CHOLEST SERPL-MCNC: 181 MG/DL
CHOLEST/HDLC SERPL: 3 {RATIO} (ref 0–5)
CLARITY UR: CLEAR
CO2 SERPL-SCNC: 28 MMOL/L (ref 23–31)
COLOR UR AUTO: YELLOW
CREAT SERPL-MCNC: 0.79 MG/DL (ref 0.55–1.02)
CREAT/UREA NIT SERPL: 18
EOSINOPHIL # BLD AUTO: 0.22 X10(3)/MCL (ref 0–0.9)
EOSINOPHIL NFR BLD AUTO: 2.2 %
ERYTHROCYTE [DISTWIDTH] IN BLOOD BY AUTOMATED COUNT: 12.7 % (ref 11.5–17)
EST. AVERAGE GLUCOSE BLD GHB EST-MCNC: 111.2 MG/DL
GFR SERPLBLD CREATININE-BSD FMLA CKD-EPI: >60 ML/MIN/1.73/M2
GLOBULIN SER-MCNC: 2.7 GM/DL (ref 2.4–3.5)
GLUCOSE SERPL-MCNC: 123 MG/DL (ref 82–115)
GLUCOSE UR QL STRIP: NEGATIVE
HBA1C MFR BLD: 5.5 %
HCT VFR BLD AUTO: 43.9 % (ref 37–47)
HDLC SERPL-MCNC: 67 MG/DL (ref 35–60)
HGB BLD-MCNC: 14.6 G/DL (ref 12–16)
HGB UR QL STRIP: NEGATIVE
IMM GRANULOCYTES # BLD AUTO: 0.03 X10(3)/MCL (ref 0–0.04)
IMM GRANULOCYTES NFR BLD AUTO: 0.3 %
KETONES UR QL STRIP: NEGATIVE
LDLC SERPL CALC-MCNC: 100 MG/DL (ref 50–140)
LEUKOCYTE ESTERASE UR QL STRIP: NEGATIVE
LYMPHOCYTES # BLD AUTO: 1.8 X10(3)/MCL (ref 0.6–4.6)
LYMPHOCYTES NFR BLD AUTO: 17.7 %
MCH RBC QN AUTO: 30.9 PG (ref 27–31)
MCHC RBC AUTO-ENTMCNC: 33.3 G/DL (ref 33–36)
MCV RBC AUTO: 93 FL (ref 80–94)
MICROALBUMIN UR-MCNC: 32 UG/ML
MONOCYTES # BLD AUTO: 0.67 X10(3)/MCL (ref 0.1–1.3)
MONOCYTES NFR BLD AUTO: 6.6 %
NEUTROPHILS # BLD AUTO: 7.4 X10(3)/MCL (ref 2.1–9.2)
NEUTROPHILS NFR BLD AUTO: 72.7 %
NITRITE UR QL STRIP: NEGATIVE
NRBC BLD AUTO-RTO: 0 %
PH UR STRIP: 7 [PH]
PLATELET # BLD AUTO: 197 X10(3)/MCL (ref 130–400)
PMV BLD AUTO: 10.8 FL (ref 7.4–10.4)
POTASSIUM SERPL-SCNC: 4.3 MMOL/L (ref 3.5–5.1)
PROT SERPL-MCNC: 6.3 GM/DL (ref 5.8–7.6)
PROT UR QL STRIP: NEGATIVE
RBC # BLD AUTO: 4.72 X10(6)/MCL (ref 4.2–5.4)
SODIUM SERPL-SCNC: 145 MMOL/L (ref 136–145)
SP GR UR STRIP.AUTO: 1.01 (ref 1–1.03)
TRIGL SERPL-MCNC: 68 MG/DL (ref 37–140)
UROBILINOGEN UR STRIP-ACNC: 0.2
VLDLC SERPL CALC-MCNC: 14 MG/DL
WBC # BLD AUTO: 10.17 X10(3)/MCL (ref 4.5–11.5)

## 2024-11-01 PROCEDURE — 80053 COMPREHEN METABOLIC PANEL: CPT

## 2024-11-01 PROCEDURE — 36415 COLL VENOUS BLD VENIPUNCTURE: CPT

## 2024-11-01 PROCEDURE — 83880 ASSAY OF NATRIURETIC PEPTIDE: CPT

## 2024-11-01 PROCEDURE — 83036 HEMOGLOBIN GLYCOSYLATED A1C: CPT

## 2024-11-01 PROCEDURE — 97110 THERAPEUTIC EXERCISES: CPT

## 2024-11-01 PROCEDURE — 97140 MANUAL THERAPY 1/> REGIONS: CPT

## 2024-11-01 PROCEDURE — 85025 COMPLETE CBC W/AUTO DIFF WBC: CPT

## 2024-11-01 PROCEDURE — 80061 LIPID PANEL: CPT

## 2024-11-01 PROCEDURE — 81003 URINALYSIS AUTO W/O SCOPE: CPT

## 2024-11-01 PROCEDURE — 82043 UR ALBUMIN QUANTITATIVE: CPT

## 2024-11-05 ENCOUNTER — CLINICAL SUPPORT (OUTPATIENT)
Dept: REHABILITATION | Facility: HOSPITAL | Age: 64
End: 2024-11-05
Payer: COMMERCIAL

## 2024-11-05 DIAGNOSIS — Z98.890 S/P LEFT ROTATOR CUFF REPAIR: Primary | ICD-10-CM

## 2024-11-05 DIAGNOSIS — Z98.890 STATUS POST LABRAL REPAIR OF SHOULDER: ICD-10-CM

## 2024-11-05 PROCEDURE — 97110 THERAPEUTIC EXERCISES: CPT

## 2024-11-05 PROCEDURE — 97140 MANUAL THERAPY 1/> REGIONS: CPT

## 2024-11-05 NOTE — PROGRESS NOTES
Physical Therapy Treatment Note     Name: Dayana Mejia  Clinic Number: [id number]    Therapy Diagnosis: Aftercare Rotator Cuff Repair  Physician: Tavo Villalta MD    Visit Date: 11/5/2024    Physician Orders: PT Eval and Treat  Medical Diagnosis from Referral: Rotator cuff tear Supraspinatus, SLAP lesion  Evaluation Date: 8/27/2024  Authorization Period Expiration: medically necessary  Plan of Care Expiration: 11/19/2024  Visit # / Visits authorized: visit #25    Time In: 1048  Time Out: 1203  Total Billable Time: 55 minutes    Surgery: Rotator cuff repair, Labral repair left shoulder on 8/8/2024  Orthopedic Precautions: Standard rotator cuff precautions; Labral precautions  Pertinent History: see medical chart    Subjective     Patient reports: Shoulder sore but doing well. Agrees to PT session.     Response to previous treatment: good    Pain: 1/10 soreness  Location: left shoulder      Outcome Measure:  Therapist reviewed FOTO scores for Dayana Mejia on 8/27/2024. FOTO documents entered into EPIC - see Media section.  FOTO filled out by: Patient    Intake: Patient's Physical FS Primary Measure: 6 (goal is 52 @ visit #26)  Intake: Risk Adjusted Statistical FOTO: 26  Intake: Limitation Score: 94%  Intake: Category: Carrying  Intake: DASH: 92.5% disability (higher score greater disability)    09/1262024: Patient's Physical FS Primary Measure: 29 (goal is 52 @ visit #26)  Intake: Risk Adjusted Statistical FOTO: 26  09/26/2024: Limitation Score: 71%  09/26/2024: Category: Carrying  09/26/2024: DASH: 80.8.% disability (higher score greater disability)    10/15/2024: Patient's Physical FS Primary Measure: 45 (goal is 52 @ visit #26)  Intake: Risk Adjusted Statistical FOTO: 26  10/15/2024:  Limitation Score: 55%  10/15/2024:  Category: Carrying  10/15/2024:  DASH: 55.0.% disability (higher score greater disability)    Objective     Dayana received the following treatment:     Time Activities   Manual 15 PROM of the left  shoulder flx, abd, ER, mobs; shoulder isometrics   TherAct     TherEx 40 NuStep, pulleys, wall slides with scap lift flex and abd, wand elevations, scapular retractions, side lying ER, serratus punch, resisted triceps, prone I's, Y's,T's; prone rows, prone ext, active flex and scap to tolerance, Active flex,scap and abd with eccentric lowers   Gait     Neuro Re-ed Not done today rhythmic stabilization, scapula neuro re-ed   Modalities 10/10 Moist heat pre exercise, CP post exercise   E-Stim     Dry Needling     Canalith Repositioning       Home Exercises Provided and Patient Education Provided     Education provided:   -Plan of care, HEP, protocol and orthopedic/surgery restrictions     Written Home Exercises Provided: yes.  Exercises were reviewed and Trupti was able to demonstrate them prior to the end of the session. Trupti demonstrated good  understanding of the education provided.     See EMR under Patient Instructions for exercises provided 8/27/2024.    Assessment     Dayana is a 64 y.o. female referred to outpatient Physical Therapy with a medical diagnosis of Rotator cuff tear Supraspinatus, SLAP lesion with Labral repair. Patient presents with pain, decreased ROM with surgical restrictions, decreased strength and functional reach and use of the right arm all resulting in decreased functional capacity.     Dayana getting really good shoulder elevation on the pulleys ~ 170 degrees flexion. Continued with some AAROM and AROM to tolerance. Did shoulderflex, scap, abd with eccentric lowers and she tolerated it pretty well. Passive abduction to about 1150 degrees, ER is 70 degrees. She is painful at end ranges but improving. Will continue the plan of care and progress per protocol. Will push ROM as tolerated. Continue 2x week.    Patient prognosis is Good.      Anticipated barriers to physical therapy: none    Goals: Dayana Is progressing well towards her goals.    Short Term Goals: 6 weeks   Patient will report  at least 10% increase in functional capacity from initial QuickDash score to indicate clinically significant functional improvement  Patient will report at least 10 point increase on initial FOTO score to indicate clinically significant functional improvement  Patient will demonstrate passive right shoulder flexion, scaption, abduction to at least 130 degrees to improve functional mobility during ADLs  Patient will demonstrate passive right shoulder external rotation to at least 45 degrees to improve functional mobility during ADLs       Long Term Goals: 12 weeks   Patient will report at least 20% increase in functional capacity from initial QuickDash score to indicate clinically significant functional improvement  Patient will report at least 20 point increase on initial FOTO score to indicate clinically significant functional improvement  Patient will demonstrate active right shoulder flexion, scaption, abduction to at least 165 degrees to improve functional mobility during ADLs  Patient will demonstrate active right shoulder external rotation and internal rotation to 70 degrees and 90 degrees respectively  Patient will demonstrate ability to return to light gardening and report 2/10 pain or less    Plan     Plan of care Certification: 8/27/2024 to 11/19/2024.     Outpatient Physical Therapy 3 times weekly for 12 weeks to include the following interventions: Manual Therapy, Moist Heat/ Ice, Neuromuscular Re-ed, Patient Education, Self Care, Therapeutic Activities, and Therapeutic Exercise.     Regulo Hernandez, PT

## 2024-11-07 ENCOUNTER — CLINICAL SUPPORT (OUTPATIENT)
Dept: REHABILITATION | Facility: HOSPITAL | Age: 64
End: 2024-11-07
Payer: COMMERCIAL

## 2024-11-07 DIAGNOSIS — Z98.890 STATUS POST LABRAL REPAIR OF SHOULDER: ICD-10-CM

## 2024-11-07 DIAGNOSIS — Z98.890 S/P LEFT ROTATOR CUFF REPAIR: Primary | ICD-10-CM

## 2024-11-07 PROCEDURE — 97110 THERAPEUTIC EXERCISES: CPT

## 2024-11-07 PROCEDURE — 97140 MANUAL THERAPY 1/> REGIONS: CPT

## 2024-11-07 NOTE — PROGRESS NOTES
Physical Therapy Treatment Note     Name: Dayana Mejia  Clinic Number: [id number]    Therapy Diagnosis: Aftercare Rotator Cuff Repair  Physician: Tavo Villalta MD    Visit Date: 11/7/2024    Physician Orders: PT Eval and Treat  Medical Diagnosis from Referral: Rotator cuff tear Supraspinatus, SLAP lesion  Evaluation Date: 8/27/2024  Authorization Period Expiration: medically necessary  Plan of Care Expiration: 11/19/2024  Visit # / Visits authorized: visit #25    Time In: 1113  Time Out: 1216  Total Billable Time: 53 minutes    Surgery: Rotator cuff repair, Labral repair left shoulder on 8/8/2024  Orthopedic Precautions: Standard rotator cuff precautions; Labral precautions  Pertinent History: see medical chart    Subjective     Patient reports: Shoulder remains sore but doing well. Doing more things at home with the arm. Agrees to PT session.     Response to previous treatment: good    Pain: 1/10 soreness  Location: left shoulder      Outcome Measure:  Therapist reviewed FOTO scores for Dayana Mejia on 8/27/2024. FOTO documents entered into EPIC - see Media section.  FOTO filled out by: Patient    Intake: Patient's Physical FS Primary Measure: 6 (goal is 52 @ visit #26)  Intake: Risk Adjusted Statistical FOTO: 26  Intake: Limitation Score: 94%  Intake: Category: Carrying  Intake: DASH: 92.5% disability (higher score greater disability)    09/1262024: Patient's Physical FS Primary Measure: 29 (goal is 52 @ visit #26)  Intake: Risk Adjusted Statistical FOTO: 26  09/26/2024: Limitation Score: 71%  09/26/2024: Category: Carrying  09/26/2024: DASH: 80.8.% disability (higher score greater disability)    10/15/2024: Patient's Physical FS Primary Measure: 45 (goal is 52 @ visit #26)  Intake: Risk Adjusted Statistical FOTO: 26  10/15/2024:  Limitation Score: 55%  10/15/2024:  Category: Carrying  10/15/2024:  DASH: 55.0.% disability (higher score greater disability)    Riley Anne received the following  treatment:     Time Activities   Manual 15 PROM of the left shoulder flx, abd, ER, mobs; shoulder isometrics   TherAct     TherEx 38 NuStep, pulleys, wall slides with scap lift flex and abd, wand elevations, scapular retractions, side lying ER, serratus punch, resisted triceps, prone I's, Y's,T's; prone rows, prone ext, active flex and scap to tolerance, Active flex,scap and abd with eccentric lowers, IR stretches with towel   Gait     Neuro Re-ed Not done today rhythmic stabilization, scapula neuro re-ed   Modalities 10/10 Moist heat pre exercise, CP post exercise   E-Stim     Dry Needling     Canalith Repositioning       Home Exercises Provided and Patient Education Provided     Education provided:   -Plan of care, HEP, protocol and orthopedic/surgery restrictions     Written Home Exercises Provided: yes.  Exercises were reviewed and Trupti was able to demonstrate them prior to the end of the session. Trupti demonstrated good  understanding of the education provided.     See EMR under Patient Instructions for exercises provided 8/27/2024.    Assessment     Dayana is a 64 y.o. female referred to outpatient Physical Therapy with a medical diagnosis of Rotator cuff tear Supraspinatus, SLAP lesion with Labral repair. Patient presents with pain, decreased ROM with surgical restrictions, decreased strength and functional reach and use of the right arm all resulting in decreased functional capacity.     Dayana getting really good shoulder elevation on the pulleys ~ 170 degrees flexion. Continued with some AAROM and AROM to tolerance. Did shoulder flex, scap, abd with eccentric lowers and she tolerated it pretty well. Did more IR stretching with towel today and she did ok. Passive abduction to about 150 degrees, ER is 70 degrees. She is painful at end ranges but improving. Will continue the plan of care and progress per protocol. Will push ROM as tolerated. Continue 2x week.    Patient prognosis is Good.      Anticipated  barriers to physical therapy: none    Goals: Dayana Is progressing well towards her goals.    Short Term Goals: 6 weeks   Patient will report at least 10% increase in functional capacity from initial QuickDash score to indicate clinically significant functional improvement  Patient will report at least 10 point increase on initial FOTO score to indicate clinically significant functional improvement  Patient will demonstrate passive right shoulder flexion, scaption, abduction to at least 130 degrees to improve functional mobility during ADLs  Patient will demonstrate passive right shoulder external rotation to at least 45 degrees to improve functional mobility during ADLs       Long Term Goals: 12 weeks   Patient will report at least 20% increase in functional capacity from initial QuickDash score to indicate clinically significant functional improvement  Patient will report at least 20 point increase on initial FOTO score to indicate clinically significant functional improvement  Patient will demonstrate active right shoulder flexion, scaption, abduction to at least 165 degrees to improve functional mobility during ADLs  Patient will demonstrate active right shoulder external rotation and internal rotation to 70 degrees and 90 degrees respectively  Patient will demonstrate ability to return to light gardening and report 2/10 pain or less    Plan     Plan of care Certification: 8/27/2024 to 11/19/2024.     Outpatient Physical Therapy 3 times weekly for 12 weeks to include the following interventions: Manual Therapy, Moist Heat/ Ice, Neuromuscular Re-ed, Patient Education, Self Care, Therapeutic Activities, and Therapeutic Exercise.     Regulo Hernandez, PT

## 2024-11-12 ENCOUNTER — CLINICAL SUPPORT (OUTPATIENT)
Dept: REHABILITATION | Facility: HOSPITAL | Age: 64
End: 2024-11-12
Payer: COMMERCIAL

## 2024-11-12 DIAGNOSIS — Z98.890 STATUS POST LABRAL REPAIR OF SHOULDER: ICD-10-CM

## 2024-11-12 DIAGNOSIS — Z98.890 S/P LEFT ROTATOR CUFF REPAIR: Primary | ICD-10-CM

## 2024-11-12 PROCEDURE — 97110 THERAPEUTIC EXERCISES: CPT

## 2024-11-12 PROCEDURE — 97140 MANUAL THERAPY 1/> REGIONS: CPT

## 2024-11-12 NOTE — PROGRESS NOTES
Physical Therapy Treatment Note     Name: Dayana Mejia  Clinic Number: [id number]    Therapy Diagnosis: Aftercare Rotator Cuff Repair  Physician: Tavo Villalta MD    Visit Date: 11/12/2024    Physician Orders: PT Eval and Treat  Medical Diagnosis from Referral: Rotator cuff tear Supraspinatus, SLAP lesion  Evaluation Date: 8/27/2024  Authorization Period Expiration: medically necessary  Plan of Care Expiration: 11/19/2024  Visit # / Visits authorized: visit #27    Time In: 1045  Time Out: 1148  Total Billable Time: 53 minutes    Surgery: Rotator cuff repair, Labral repair left shoulder on 8/8/2024  Orthopedic Precautions: Standard rotator cuff precautions; Labral precautions  Pertinent History: see medical chart    Subjective     Patient reports: Shoulder remains sore but continues to do well. Doing more things at home with the arm but not lifting anything. Agrees to PT session.     Response to previous treatment: good    Pain: 1/10 soreness  Location: left shoulder      Outcome Measure:  Therapist reviewed FOTO scores for Dayana Mejia on 8/27/2024. FOTO documents entered into EPIC - see Media section.  FOTO filled out by: Patient    Intake: Patient's Physical FS Primary Measure: 6 (goal is 52 @ visit #26)  Intake: Risk Adjusted Statistical FOTO: 26  Intake: Limitation Score: 94%  Intake: Category: Carrying  Intake: DASH: 92.5% disability (higher score greater disability)    09/1262024: Patient's Physical FS Primary Measure: 29 (goal is 52 @ visit #26)  Intake: Risk Adjusted Statistical FOTO: 26 09/26/2024: Limitation Score: 71%  09/26/2024: Category: Carrying  09/26/2024: DASH: 80.8.% disability (higher score greater disability)    10/15/2024: Patient's Physical FS Primary Measure: 45 (goal is 52 @ visit #26)  Intake: Risk Adjusted Statistical FOTO: 26  10/15/2024:  Limitation Score: 55%  10/15/2024:  Category: Carrying  10/15/2024:  DASH: 55.0.% disability (higher score greater disability)    Objective      Dayana received the following treatment:     Time Activities   Manual 15 PROM of the left shoulder flx, abd, ER, mobs; shoulder isometrics   TherAct     TherEx 38 NuStep, pulleys, wall slides with scap lift flex and abd, wand elevations, scapular retractions, side lying ER, serratus punch, resisted triceps, prone I's, Y's,T's; prone rows, prone ext, active flex and scap to tolerance, Active flex,scap and abd with eccentric lowers, IR stretches with towel   Gait     Neuro Re-ed Not done today rhythmic stabilization, scapula neuro re-ed   Modalities 10/10 Moist heat pre exercise, CP post exercise   E-Stim     Dry Needling     Canalith Repositioning       Home Exercises Provided and Patient Education Provided     Education provided:   -Plan of care, HEP, protocol and orthopedic/surgery restrictions     Written Home Exercises Provided: yes.  Exercises were reviewed and Trupti was able to demonstrate them prior to the end of the session. Trupti demonstrated good  understanding of the education provided.     See EMR under Patient Instructions for exercises provided 8/27/2024.    Assessment     Dayana is a 64 y.o. female referred to outpatient Physical Therapy with a medical diagnosis of Rotator cuff tear Supraspinatus, SLAP lesion with Labral repair. Patient presents with pain, decreased ROM with surgical restrictions, decreased strength and functional reach and use of the right arm all resulting in decreased functional capacity.     Dayana getting really good shoulder elevation on the pulleys ~ 175 degrees flexion. Continued with some AAROM and AROM to tolerance. Did shoulder flex, scap, abd with eccentric lowers and she tolerated it pretty well. Did more IR stretching with towel today and she did ok. Improved passive abduction to about 165 degrees, ER is 75 degrees. Better ROM all around. She is painful at end ranges but improving. Will continue the plan of care and progress per protocol. Will push ROM as tolerated.  Continue 2x week.    Patient prognosis is Good.      Anticipated barriers to physical therapy: none    Goals: Dayana Is progressing well towards her goals.    Short Term Goals: 6 weeks   Patient will report at least 10% increase in functional capacity from initial QuickDash score to indicate clinically significant functional improvement  Patient will report at least 10 point increase on initial FOTO score to indicate clinically significant functional improvement  Patient will demonstrate passive right shoulder flexion, scaption, abduction to at least 130 degrees to improve functional mobility during ADLs  Patient will demonstrate passive right shoulder external rotation to at least 45 degrees to improve functional mobility during ADLs       Long Term Goals: 12 weeks   Patient will report at least 20% increase in functional capacity from initial QuickDash score to indicate clinically significant functional improvement  Patient will report at least 20 point increase on initial FOTO score to indicate clinically significant functional improvement  Patient will demonstrate active right shoulder flexion, scaption, abduction to at least 165 degrees to improve functional mobility during ADLs  Patient will demonstrate active right shoulder external rotation and internal rotation to 70 degrees and 90 degrees respectively  Patient will demonstrate ability to return to light gardening and report 2/10 pain or less    Plan     Plan of care Certification: 8/27/2024 to 11/19/2024.     Outpatient Physical Therapy 3 times weekly for 12 weeks to include the following interventions: Manual Therapy, Moist Heat/ Ice, Neuromuscular Re-ed, Patient Education, Self Care, Therapeutic Activities, and Therapeutic Exercise.     Regulo Hernandez, PT

## 2024-11-19 ENCOUNTER — CLINICAL SUPPORT (OUTPATIENT)
Dept: REHABILITATION | Facility: HOSPITAL | Age: 64
End: 2024-11-19
Payer: COMMERCIAL

## 2024-11-19 DIAGNOSIS — Z98.890 STATUS POST LABRAL REPAIR OF SHOULDER: ICD-10-CM

## 2024-11-19 DIAGNOSIS — Z98.890 S/P LEFT ROTATOR CUFF REPAIR: Primary | ICD-10-CM

## 2024-11-19 PROCEDURE — 97110 THERAPEUTIC EXERCISES: CPT

## 2024-11-19 PROCEDURE — 97140 MANUAL THERAPY 1/> REGIONS: CPT

## 2024-11-19 NOTE — PROGRESS NOTES
Physical Therapy Treatment Note     Name: Dayana Mejia  Clinic Number: [id number]    Therapy Diagnosis: Aftercare Rotator Cuff Repair  Physician: Tavo Villalta MD    Visit Date: 11/19/2024    Physician Orders: PT Eval and Treat  Medical Diagnosis from Referral: Rotator cuff tear Supraspinatus, SLAP lesion  Evaluation Date: 8/27/2024  Authorization Period Expiration: medically necessary  Plan of Care Expiration: 11/19/2024  Visit # / Visits authorized: visit #28    Time In: 1048  Time Out: 1201  Total Billable Time: 53 minutes    Surgery: Rotator cuff repair, Labral repair left shoulder on 8/8/2024  Orthopedic Precautions: Standard rotator cuff precautions; Labral precautions  Pertinent History: see medical chart    Subjective     Patient reports: Shoulder remains sore. Reports improved range with shoulder flexion but not with shoulder abd. Doing more things at home with the arm but not lifting anything. Agrees to PT session.     Response to previous treatment: good    Pain: 1/10 soreness  Location: left shoulder      Outcome Measure:  Therapist reviewed FOTO scores for Dayana Mejia on 8/27/2024. FOTO documents entered into EPIC - see Media section.  FOTO filled out by: Patient    Intake: Patient's Physical FS Primary Measure: 6 (goal is 52 @ visit #26)  Intake: Risk Adjusted Statistical FOTO: 26  Intake: Limitation Score: 94%  Intake: Category: Carrying  Intake: DASH: 92.5% disability (higher score greater disability)    09/1262024: Patient's Physical FS Primary Measure: 29 (goal is 52 @ visit #26)  Intake: Risk Adjusted Statistical FOTO: 26 09/26/2024: Limitation Score: 71%  09/26/2024: Category: Carrying  09/26/2024: DASH: 80.8.% disability (higher score greater disability)    10/15/2024: Patient's Physical FS Primary Measure: 45 (goal is 52 @ visit #26)  Intake: Risk Adjusted Statistical FOTO: 26  10/15/2024:  Limitation Score: 55%  10/15/2024:  Category: Carrying  10/15/2024:  DASH: 55.0.% disability (higher  score greater disability)    Objective     Dayana received the following treatment:     Time Activities   Manual 15 PROM of the left shoulder flx, abd, ER, mobs; shoulder isometrics   TherAct     TherEx 38 NuStep, pulleys, wall slides with scap lift flex and abd, wand elevations, scapular retractions, side lying ER, serratus punch, resisted triceps, prone I's, Y's,T's; prone rows, prone ext, active flex and scap to tolerance, Active flex,scap and abd with eccentric lowers, IR stretches with towel   Gait     Neuro Re-ed Not done today rhythmic stabilization, scapula neuro re-ed   Modalities 10/10 Moist heat pre exercise, CP post exercise   E-Stim     Dry Needling     Canalith Repositioning       Home Exercises Provided and Patient Education Provided     Education provided:   -Plan of care, HEP, protocol and orthopedic/surgery restrictions     Written Home Exercises Provided: yes.  Exercises were reviewed and Trupti was able to demonstrate them prior to the end of the session. Trupti demonstrated good  understanding of the education provided.     See EMR under Patient Instructions for exercises provided 8/27/2024.    Assessment     Dayana is a 64 y.o. female referred to outpatient Physical Therapy with a medical diagnosis of Rotator cuff tear Supraspinatus, SLAP lesion with Labral repair. Patient presents with pain, decreased ROM with surgical restrictions, decreased strength and functional reach and use of the right arm all resulting in decreased functional capacity.     All short term goals have been met and she is continuing to improve. She is getting good shoulder elevation on the pulleys ~ 175 degrees flexion. Continued with some AAROM and AROM to tolerance. Did shoulder flex, scap, abd with eccentric lowers and she tolerated it pretty well. Did more IR stretching with towel today and she did ok. Improved passive abduction to about 165 degrees, ER is 75 degrees. Better ROM all around. She is painful at end ranges  but improving. Will continue the plan of care and progress per protocol. Will push ROM as tolerated. Continue 2x week.    Patient prognosis is Good.      Anticipated barriers to physical therapy: none    Goals: Dayana Is progressing well towards her goals.    Short Term Goals: 6 weeks   Patient will report at least 10% increase in functional capacity from initial QuickDash score to indicate clinically significant functional improvement-Goal Met  Patient will report at least 10 point increase on initial FOTO score to indicate clinically significant functional improvement-Goal Met  Patient will demonstrate passive right shoulder flexion, scaption, abduction to at least 130 degrees to improve functional mobility during ADLs-Goal Met  Patient will demonstrate passive right shoulder external rotation to at least 45 degrees to improve functional mobility during ADLs -Goal Met      Long Term Goals: 12 weeks   Patient will report at least 20% increase in functional capacity from initial QuickDash score to indicate clinically significant functional improvement  Patient will report at least 20 point increase on initial FOTO score to indicate clinically significant functional improvement  Patient will demonstrate active right shoulder flexion, scaption, abduction to at least 165 degrees to improve functional mobility during ADLs  Patient will demonstrate active right shoulder external rotation and internal rotation to 70 degrees and 90 degrees respectively  Patient will demonstrate ability to return to light gardening and report 2/10 pain or less    Plan     Plan of care Certification: 8/27/2024 to 11/19/2024.     Outpatient Physical Therapy 3 times weekly for 12 weeks to include the following interventions: Manual Therapy, Moist Heat/ Ice, Neuromuscular Re-ed, Patient Education, Self Care, Therapeutic Activities, and Therapeutic Exercise.     Regulo Hernandez, PT

## 2024-11-21 ENCOUNTER — CLINICAL SUPPORT (OUTPATIENT)
Dept: REHABILITATION | Facility: HOSPITAL | Age: 64
End: 2024-11-21
Payer: COMMERCIAL

## 2024-11-21 DIAGNOSIS — Z98.890 S/P LEFT ROTATOR CUFF REPAIR: Primary | ICD-10-CM

## 2024-11-21 DIAGNOSIS — Z98.890 STATUS POST LABRAL REPAIR OF SHOULDER: ICD-10-CM

## 2024-11-21 PROCEDURE — 97110 THERAPEUTIC EXERCISES: CPT

## 2024-11-21 PROCEDURE — 97140 MANUAL THERAPY 1/> REGIONS: CPT

## 2024-11-21 NOTE — PROGRESS NOTES
Physical Therapy Treatment Note     Name: Dayana Mejia  Clinic Number: [id number]    Therapy Diagnosis: Aftercare Rotator Cuff Repair  Physician: Tavo Villalta MD    Visit Date: 11/21/2024    Physician Orders: PT Eval and Treat  Medical Diagnosis from Referral: Rotator cuff tear Supraspinatus, SLAP lesion  Evaluation Date: 8/27/2024  Authorization Period Expiration: medically necessary  Plan of Care Expiration: 11/19/2024  Visit # / Visits authorized: visit #29    Time In: 1048  Time Out: 1201  Total Billable Time: 53 minutes    Surgery: Rotator cuff repair, Labral repair left shoulder on 8/8/2024  Orthopedic Precautions: Standard rotator cuff precautions; Labral precautions  Pertinent History: see medical chart    Subjective     Patient reports: Shoulder remains sore. Reports improved range with shoulder flexion but not with shoulder abd. Doing more things at home with the arm but not lifting anything. Agrees to PT session.     Response to previous treatment: good    Pain: 1/10 soreness  Location: left shoulder      Outcome Measure:  Therapist reviewed FOTO scores for Dayana Mejia on 8/27/2024. FOTO documents entered into EPIC - see Media section.  FOTO filled out by: Patient    Intake: Patient's Physical FS Primary Measure: 6 (goal is 52 @ visit #26)  Intake: Risk Adjusted Statistical FOTO: 26  Intake: Limitation Score: 94%  Intake: Category: Carrying  Intake: DASH: 92.5% disability (higher score greater disability)    09/1262024: Patient's Physical FS Primary Measure: 29 (goal is 52 @ visit #26)  Intake: Risk Adjusted Statistical FOTO: 26 09/26/2024: Limitation Score: 71%  09/26/2024: Category: Carrying  09/26/2024: DASH: 80.8.% disability (higher score greater disability)    10/15/2024: Patient's Physical FS Primary Measure: 45 (goal is 52 @ visit #26)  Intake: Risk Adjusted Statistical FOTO: 26  10/15/2024:  Limitation Score: 55%  10/15/2024:  Category: Carrying  10/15/2024:  DASH: 55.0.% disability (higher  score greater disability)    Objective     Dayana received the following treatment:     Time Activities   Manual 15 PROM of the left shoulder flx, abd, ER, mobs; shoulder isometrics   TherAct     TherEx 38 NuStep, pulleys, wall slides with scap lift flex and abd, wand elevations, scapular retractions, side lying ER, serratus punch, resisted triceps, prone I's, Y's,T's; prone rows, prone ext, active flex and scap to tolerance, Active flex,scap and abd with eccentric lowers, IR stretches with towel   Gait     Neuro Re-ed Not done today rhythmic stabilization, scapula neuro re-ed   Modalities 10/10 Moist heat pre exercise, CP post exercise   E-Stim     Dry Needling     Canalith Repositioning       Home Exercises Provided and Patient Education Provided     Education provided:   -Plan of care, HEP, protocol and orthopedic/surgery restrictions     Written Home Exercises Provided: yes.  Exercises were reviewed and Trupti was able to demonstrate them prior to the end of the session. Trupti demonstrated good  understanding of the education provided.     See EMR under Patient Instructions for exercises provided 8/27/2024.    Assessment     Dayana is a 64 y.o. female referred to outpatient Physical Therapy with a medical diagnosis of Rotator cuff tear Supraspinatus, SLAP lesion with Labral repair. Patient presents with pain, decreased ROM with surgical restrictions, decreased strength and functional reach and use of the right arm all resulting in decreased functional capacity.     All short term goals have been met and she is continuing to improve. She is getting good shoulder elevation on the pulleys ~ 175 degrees flexion. Continued with some AAROM and AROM to tolerance. Did shoulder flex, scap, abd with eccentric lowers and she tolerated it pretty well. Did more IR stretching with towel today and she did ok. Improved passive abduction to about 165 degrees, ER is 75 degrees. Better ROM all around. She is painful at end ranges  but improving. Will continue the plan of care and progress per protocol. Will push ROM as tolerated. Continue 2x week.    Patient prognosis is Good.      Anticipated barriers to physical therapy: none    Goals: Dayana Is progressing well towards her goals.    Short Term Goals: 6 weeks   Patient will report at least 10% increase in functional capacity from initial QuickDash score to indicate clinically significant functional improvement-Goal Met  Patient will report at least 10 point increase on initial FOTO score to indicate clinically significant functional improvement-Goal Met  Patient will demonstrate passive right shoulder flexion, scaption, abduction to at least 130 degrees to improve functional mobility during ADLs-Goal Met  Patient will demonstrate passive right shoulder external rotation to at least 45 degrees to improve functional mobility during ADLs -Goal Met      Long Term Goals: 12 weeks   Patient will report at least 20% increase in functional capacity from initial QuickDash score to indicate clinically significant functional improvement  Patient will report at least 20 point increase on initial FOTO score to indicate clinically significant functional improvement  Patient will demonstrate active right shoulder flexion, scaption, abduction to at least 165 degrees to improve functional mobility during ADLs  Patient will demonstrate active right shoulder external rotation and internal rotation to 70 degrees and 90 degrees respectively  Patient will demonstrate ability to return to light gardening and report 2/10 pain or less    Plan     Plan of care Certification: 8/27/2024 to 11/19/2024.     Outpatient Physical Therapy 3 times weekly for 12 weeks to include the following interventions: Manual Therapy, Moist Heat/ Ice, Neuromuscular Re-ed, Patient Education, Self Care, Therapeutic Activities, and Therapeutic Exercise.     Regulo Hernandez, PT

## 2024-11-26 ENCOUNTER — CLINICAL SUPPORT (OUTPATIENT)
Dept: REHABILITATION | Facility: HOSPITAL | Age: 64
End: 2024-11-26
Payer: COMMERCIAL

## 2024-11-26 DIAGNOSIS — Z98.890 S/P LEFT ROTATOR CUFF REPAIR: Primary | ICD-10-CM

## 2024-11-26 DIAGNOSIS — Z98.890 STATUS POST LABRAL REPAIR OF SHOULDER: ICD-10-CM

## 2024-11-26 PROCEDURE — 97110 THERAPEUTIC EXERCISES: CPT

## 2024-11-26 PROCEDURE — 97140 MANUAL THERAPY 1/> REGIONS: CPT

## 2024-11-26 NOTE — PROGRESS NOTES
Physical Therapy Treatment Note     Name: Dayana Mejia  Clinic Number: [id number]    Therapy Diagnosis: Aftercare Rotator Cuff Repair  Physician: Tavo Villalta MD    Visit Date: 11/26/2024    Physician Orders: PT Eval and Treat  Medical Diagnosis from Referral: Rotator cuff tear Supraspinatus, SLAP lesion  Evaluation Date: 8/27/2024  Authorization Period Expiration: medically necessary  Plan of Care Expiration: 11/19/2024  Visit # / Visits authorized: visit #30    Time In: 1049  Time Out: 1202  Total Billable Time: 53 minutes    Surgery: Rotator cuff repair, Labral repair left shoulder on 8/8/2024  Orthopedic Precautions: Standard rotator cuff precautions; Labral precautions  Pertinent History: see medical chart    Subjective     Patient reports: no real pain unless using the arm and putting in certain positions. States the doctor told her it could take 6 months to a year. His expectation she will regain 90%. Agrees to PT session.     Response to previous treatment: good    Pain: 1/10 soreness  Location: left shoulder      Outcome Measure:  Therapist reviewed FOTO scores for Dayana Mejia on 8/27/2024. FOTO documents entered into EPIC - see Media section.  FOTO filled out by: Patient    Intake: Patient's Physical FS Primary Measure: 6 (goal is 52 @ visit #26)  Intake: Risk Adjusted Statistical FOTO: 26  Intake: Limitation Score: 94%  Intake: Category: Carrying  Intake: DASH: 92.5% disability (higher score greater disability)    09/1262024: Patient's Physical FS Primary Measure: 29 (goal is 52 @ visit #26)  Intake: Risk Adjusted Statistical FOTO: 26 09/26/2024: Limitation Score: 71%  09/26/2024: Category: Carrying  09/26/2024: DASH: 80.8.% disability (higher score greater disability)    10/15/2024: Patient's Physical FS Primary Measure: 45 (goal is 52 @ visit #26)  Intake: Risk Adjusted Statistical FOTO: 26  10/15/2024:  Limitation Score: 55%  10/15/2024:  Category: Carrying  10/15/2024:  DASH: 55.0.% disability  (higher score greater disability)    Objective     Dayana received the following treatment:     Time Activities   Manual 15 PROM of the left shoulder flx, abd, ER, mobs; shoulder isometrics   TherAct     TherEx 38 NuStep, pulleys, wall slides with scap lift flex and abd, wand elevations, scapular retractions, side lying ER, serratus punch, resisted triceps, prone I's, Y's,T's; prone rows, prone ext, active flex and scap to tolerance, Active flex,scap and abd with eccentric lowers, IR stretches with towel   Gait     Neuro Re-ed Not done today rhythmic stabilization, scapula neuro re-ed   Modalities 10/10 Moist heat pre exercise, CP post exercise   E-Stim     Dry Needling     Canalith Repositioning       Home Exercises Provided and Patient Education Provided     Education provided:   -Plan of care, HEP, protocol and orthopedic/surgery restrictions     Written Home Exercises Provided: yes.  Exercises were reviewed and Trupti was able to demonstrate them prior to the end of the session. Trupti demonstrated good  understanding of the education provided.     See EMR under Patient Instructions for exercises provided 8/27/2024.    Assessment     Dayana is a 64 y.o. female referred to outpatient Physical Therapy with a medical diagnosis of Rotator cuff tear Supraspinatus, SLAP lesion with Labral repair. Patient presents with pain, decreased ROM with surgical restrictions, decreased strength and functional reach and use of the right arm all resulting in decreased functional capacity.     All short term goals have been met and she is continuing to improve. She is getting good shoulder elevation on the pulleys ~ 175 degrees flexion. Continued with some AAROM and AROM to tolerance. Did shoulder flex, scap, abd with eccentric lowers and she tolerated it pretty well. Improved passive abduction to about  160-165 degrees, ER is 75 degrees. Better ROM all around. She is painful at end ranges but improving. Will continue the plan of  care and progress per protocol. Nearing her max benefit. Continue 2x week.    Patient prognosis is Good.      Anticipated barriers to physical therapy: none    Goals: Dayana Is progressing well towards her goals.    Short Term Goals: 6 weeks   Patient will report at least 10% increase in functional capacity from initial QuickDash score to indicate clinically significant functional improvement-Goal Met  Patient will report at least 10 point increase on initial FOTO score to indicate clinically significant functional improvement-Goal Met  Patient will demonstrate passive right shoulder flexion, scaption, abduction to at least 130 degrees to improve functional mobility during ADLs-Goal Met  Patient will demonstrate passive right shoulder external rotation to at least 45 degrees to improve functional mobility during ADLs -Goal Met      Long Term Goals: 12 weeks   Patient will report at least 20% increase in functional capacity from initial QuickDash score to indicate clinically significant functional improvement  Patient will report at least 20 point increase on initial FOTO score to indicate clinically significant functional improvement  Patient will demonstrate active right shoulder flexion, scaption, abduction to at least 165 degrees to improve functional mobility during ADLs  Patient will demonstrate active right shoulder external rotation and internal rotation to 70 degrees and 90 degrees respectively  Patient will demonstrate ability to return to light gardening and report 2/10 pain or less    Plan     Plan of care Certification: 8/27/2024 to 11/19/2024.     Outpatient Physical Therapy 3 times weekly for 12 weeks to include the following interventions: Manual Therapy, Moist Heat/ Ice, Neuromuscular Re-ed, Patient Education, Self Care, Therapeutic Activities, and Therapeutic Exercise.     Regulo Hernandez, PT

## 2024-12-03 ENCOUNTER — CLINICAL SUPPORT (OUTPATIENT)
Dept: REHABILITATION | Facility: HOSPITAL | Age: 64
End: 2024-12-03
Payer: COMMERCIAL

## 2024-12-03 DIAGNOSIS — Z98.890 STATUS POST LABRAL REPAIR OF SHOULDER: ICD-10-CM

## 2024-12-03 DIAGNOSIS — Z98.890 S/P LEFT ROTATOR CUFF REPAIR: Primary | ICD-10-CM

## 2024-12-03 PROCEDURE — 97110 THERAPEUTIC EXERCISES: CPT

## 2024-12-03 PROCEDURE — 97140 MANUAL THERAPY 1/> REGIONS: CPT

## 2024-12-03 NOTE — PROGRESS NOTES
"  Physical Therapy Treatment Note     Name: Dyaana Mejia  Clinic Number: [id number]    Therapy Diagnosis: Aftercare Rotator Cuff Repair  Physician: Tavo Villalta MD    Visit Date: 12/3/2024    Physician Orders: PT Eval and Treat  Medical Diagnosis from Referral: Rotator cuff tear Supraspinatus, SLAP lesion  Evaluation Date: 8/27/2024  Authorization Period Expiration: medically necessary  Plan of Care Expiration: 11/19/2024  Visit # / Visits authorized: visit #30    Time In: 1044  Time Out: 1157  Total Billable Time: 53 minutes    Surgery: Rotator cuff repair, Labral repair left shoulder on 8/8/2024  Orthopedic Precautions: Standard rotator cuff precautions; Labral precautions  Pertinent History: see medical chart    Subjective     Patient reports: Dayana returns from her vacation over Thanksgiving and reports "I'm tired of therapy" and request discharge on Thursday of this week. Having no real pain unless using the arm and putting in certain positions. States the doctor told her it could take 6 months to a year. His expectation she will regain 90%. Agrees to PT session.     Response to previous treatment: good    Pain: 1/10 soreness  Location: left shoulder      Outcome Measure:  Therapist reviewed FOTO scores for Dayana Mejia on 8/27/2024. FOTO documents entered into EPIC - see Media section.  FOTO filled out by: Patient    Intake: Patient's Physical FS Primary Measure: 6 (goal is 52 @ visit #26)  Intake: Risk Adjusted Statistical FOTO: 26  Intake: Limitation Score: 94%  Intake: Category: Carrying  Intake: DASH: 92.5% disability (higher score greater disability)    09/1262024: Patient's Physical FS Primary Measure: 29 (goal is 52 @ visit #26)  Intake: Risk Adjusted Statistical FOTO: 26  09/26/2024: Limitation Score: 71%  09/26/2024: Category: Carrying  09/26/2024: DASH: 80.8.% disability (higher score greater disability)    10/15/2024: Patient's Physical FS Primary Measure: 45 (goal is 52 @ visit #26)  Intake: Risk " Adjusted Statistical FOTO: 26  10/15/2024:  Limitation Score: 55%  10/15/2024:  Category: Carrying  10/15/2024:  DASH: 55.0.% disability (higher score greater disability)    Objective     Dayana received the following treatment:     Time Activities   Manual 15 PROM of the left shoulder flx, abd, ER, mobs; shoulder isometrics   TherAct     TherEx 38 NuStep, pulleys, wall slides with scap lift flex and abd, wand elevations, scapular retractions, side lying ER, serratus punch, resisted triceps, prone I's, Y's,T's; prone rows, prone ext, active flex and scap to tolerance, Active flex,scap and abd with eccentric lowers, IR stretches with towel   Gait     Neuro Re-ed Not done today rhythmic stabilization, scapula neuro re-ed   Modalities 10/10 Moist heat pre exercise, CP post exercise   E-Stim     Dry Needling     Canalith Repositioning       Home Exercises Provided and Patient Education Provided     Education provided:   -Plan of care, HEP, protocol and orthopedic/surgery restrictions     Written Home Exercises Provided: yes.  Exercises were reviewed and Trupti was able to demonstrate them prior to the end of the session. Trupti demonstrated good  understanding of the education provided.     See EMR under Patient Instructions for exercises provided 8/27/2024.    Assessment     Dayana is a 64 y.o. female referred to outpatient Physical Therapy with a medical diagnosis of Rotator cuff tear Supraspinatus, SLAP lesion with Labral repair. Patient presents with pain, decreased ROM with surgical restrictions, decreased strength and functional reach and use of the right arm all resulting in decreased functional capacity.     All short term goals have been met and she is continuing to improve. She is getting good shoulder elevation on the pulleys ~ 175 degrees flexion. Continued with some AAROM and AROM to tolerance. Did shoulder flex, scap, abd with eccentric lowers and she tolerated it pretty well. Improved passive abduction to  about  160-165 degrees, ER is 75 degrees. Better PROM than AROM all around. She is painful at end ranges but improving. Will continue the plan of care and progress per protocol.     Patient prognosis is Good.      Anticipated barriers to physical therapy: none    Goals: Dayana Is progressing well towards her goals.    Short Term Goals: 6 weeks   Patient will report at least 10% increase in functional capacity from initial QuickDash score to indicate clinically significant functional improvement-Goal Met  Patient will report at least 10 point increase on initial FOTO score to indicate clinically significant functional improvement-Goal Met  Patient will demonstrate passive right shoulder flexion, scaption, abduction to at least 130 degrees to improve functional mobility during ADLs-Goal Met  Patient will demonstrate passive right shoulder external rotation to at least 45 degrees to improve functional mobility during ADLs -Goal Met      Long Term Goals: 12 weeks   Patient will report at least 20% increase in functional capacity from initial QuickDash score to indicate clinically significant functional improvement  Patient will report at least 20 point increase on initial FOTO score to indicate clinically significant functional improvement  Patient will demonstrate active right shoulder flexion, scaption, abduction to at least 165 degrees to improve functional mobility during ADLs  Patient will demonstrate active right shoulder external rotation and internal rotation to 70 degrees and 90 degrees respectively  Patient will demonstrate ability to return to light gardening and report 2/10 pain or less    Plan     Plan of care Certification: 8/27/2024 to 11/19/2024.     Outpatient Physical Therapy 3 times weekly for 12 weeks to include the following interventions: Manual Therapy, Moist Heat/ Ice, Neuromuscular Re-ed, Patient Education, Self Care, Therapeutic Activities, and Therapeutic Exercise.     Regulo Hernandez, PT

## 2024-12-05 ENCOUNTER — CLINICAL SUPPORT (OUTPATIENT)
Dept: REHABILITATION | Facility: HOSPITAL | Age: 64
End: 2024-12-05
Payer: COMMERCIAL

## 2024-12-05 DIAGNOSIS — Z98.890 S/P LEFT ROTATOR CUFF REPAIR: Primary | ICD-10-CM

## 2024-12-05 DIAGNOSIS — Z98.890 STATUS POST LABRAL REPAIR OF SHOULDER: ICD-10-CM

## 2024-12-05 PROCEDURE — 97110 THERAPEUTIC EXERCISES: CPT

## 2024-12-05 NOTE — PROGRESS NOTES
Physical Therapy Treatment Note     Name: Dayana Mejia  Clinic Number: [id number]    Therapy Diagnosis: Aftercare Rotator Cuff Repair  Physician: Tavo Villalta MD    Visit Date: 12/5/2024    Physician Orders: PT Eval and Treat  Medical Diagnosis from Referral: Rotator cuff tear Supraspinatus, SLAP lesion  Evaluation Date: 8/27/2024  Authorization Period Expiration: medically necessary  Plan of Care Expiration: 11/19/2024  Visit # / Visits authorized: visit #32    Time In: 1101  Time Out: 1207  Total Billable Time: 46 minutes    Surgery: Rotator cuff repair, Labral repair left shoulder on 8/8/2024  Orthopedic Precautions: Standard rotator cuff precautions; Labral precautions  Pertinent History: see medical chart    Subjective     Patient reports: that she feels ready for discharge and will continue with exercises on hr own. Having no real pain unless using the arm and putting in certain positions. States the doctor told her it could take 6 months to a year. His expectation she will regain 90%. Returns to her doctor in January. Agrees to PT session.     Response to previous treatment: good    Pain: 1/10 soreness  Location: left shoulder      Outcome Measure:  Therapist reviewed FOTO scores for Dayana Mejia on 8/27/2024. FOTO documents entered into EPIC - see Media section.  FOTO filled out by: Patient    Intake: Patient's Physical FS Primary Measure: 6 (goal is 52 @ visit #26)  Intake: Risk Adjusted Statistical FOTO: 26  Intake: Limitation Score: 94%  Intake: Category: Carrying  Intake: DASH: 92.5% disability (higher score greater disability)    09/1262024: Patient's Physical FS Primary Measure: 29 (goal is 52 @ visit #26)  Intake: Risk Adjusted Statistical FOTO: 26  09/26/2024: Limitation Score: 71%  09/26/2024: Category: Carrying  09/26/2024: DASH: 80.8.% disability (higher score greater disability)    10/15/2024: Patient's Physical FS Primary Measure: 45 (goal is 52 @ visit #26)  Intake: Risk Adjusted  Statistical FOTO: 26  10/15/2024:  Limitation Score: 55%  10/15/2024:  Category: Carrying  10/15/2024:  DASH: 55.0.% disability (higher score greater disability)    11/26/2024: Patient's Physical FS Primary Measure: 63 (goal is 52 @ visit #26)  Intake: Risk Adjusted Statistical FOTO: 26 11/26/2024:  Limitation Score: 37%  11/26/2024:  Category: Carrying  11/26/2024:  DASH: 19.2% disability (higher score greater disability)    12/05/2024: Patient's Physical FS Primary Measure: 65 (goal is 52 @ visit #26)  Intake: Risk Adjusted Statistical FOTO: 26 12/05/2024:  Limitation Score: 35%  12/05/2024:  Category: Carrying  12/05/2024:  DASH: 15.8.0% disability (higher score greater disability)    Objective     AROM L shoulder: Flexion:  170 degrees                                  Abduction: 160 degrees                                  Internal Rot: 85 degrees                                  External Rot: 75 degrees      Dayana received the following treatment:     Time Activities   Manual Not done today PROM of the left shoulder flx, abd, ER, mobs; shoulder isometrics   TherAct     TherEx 46 NuStep, pulleys, wall slides with scap lift flex and abd, wand elevations, scapular retractions, side lying ER, serratus punch, resisted triceps, prone I's, Y's,T's; prone rows, prone ext, active flex and scap to tolerance, Active flex,scap and abd with eccentric lowers, IR stretches with towe, horizontal abd   Gait     Neuro Re-ed Not done today rhythmic stabilization, scapula neuro re-ed   Modalities 10/10 Moist heat pre exercise, CP post exercise   E-Stim     Dry Needling     Canalith Repositioning       Home Exercises Provided and Patient Education Provided     Education provided:   -Plan of care, HEP, protocol and orthopedic/surgery restrictions     Written Home Exercises Provided: yes.  Exercises were reviewed and Trupti was able to demonstrate them prior to the end of the session. Trupti demonstrated good  understanding of  the education provided.     See EMR under Patient Instructions for exercises provided 8/27/2024.    Assessment     Dayana is a 64 y.o. female referred to outpatient Physical Therapy with a medical diagnosis of Rotator cuff tear Supraspinatus, SLAP lesion with Labral repair. Patient presents with pain, decreased ROM with surgical restrictions, decreased strength and functional reach and use of the right arm all resulting in decreased functional capacity.     Dayana has completed 32 visits of physical therapy and she made steady progress. This is evident by her score on the functional outcome tool going from a 94% limitation score to 35%. All short term goals have been met and some long term goals. She is continuing to improve. She is getting good shoulder elevation on the pulleys ~ 175 degrees flexion. Continued with AROM to tolerance. Did shoulder flex, scap, abd with eccentric lowers and she tolerated it pretty well. Improved active abduction to 160 degrees with passive abduction to about 170 degrees, ER is 75 degrees. She is painful at end ranges but improving. Will discharge physical therapy services in favor of home exercise program.    Patient prognosis is Good.      Anticipated barriers to physical therapy: none    Goals: Dayana Is progressing well towards her goals.    Short Term Goals: 6 weeks   Patient will report at least 10% increase in functional capacity from initial QuickDash score to indicate clinically significant functional improvement-Goal Met  Patient will report at least 10 point increase on initial FOTO score to indicate clinically significant functional improvement-Goal Met  Patient will demonstrate passive right shoulder flexion, scaption, abduction to at least 130 degrees to improve functional mobility during ADLs-Goal Met  Patient will demonstrate passive right shoulder external rotation to at least 45 degrees to improve functional mobility during ADLs -Goal Met      Long Term Goals: 12 weeks    Patient will report at least 20% increase in functional capacity from initial QuickDash score to indicate clinically significant functional improvement-Goal met  Patient will report at least 20 point increase on initial FOTO score to indicate clinically significant functional improvement-Goal met  Patient will demonstrate active right shoulder flexion, scaption, abduction to at least 165 degrees to improve functional mobility during ADLs-Partially met  Patient will demonstrate active right shoulder external rotation and internal rotation to 70 degrees and 90 degrees respectively-Partially met  Patient will demonstrate ability to return to light gardening and report 2/10 pain or less-Goal met    Plan     Discharge physical therapy services in favor of home exercise program    Regulo Hernandez, PT

## 2024-12-10 ENCOUNTER — LAB VISIT (OUTPATIENT)
Dept: LAB | Facility: HOSPITAL | Age: 64
End: 2024-12-10
Attending: INTERNAL MEDICINE
Payer: COMMERCIAL

## 2024-12-10 DIAGNOSIS — E11.22 TYPE 2 DIABETES MELLITUS WITH END-STAGE RENAL DISEASE: ICD-10-CM

## 2024-12-10 DIAGNOSIS — I43 DILATED CARDIOMYOPATHY SECONDARY TO ELECTROLYTE DEFICIENCY: ICD-10-CM

## 2024-12-10 DIAGNOSIS — N18.6 TYPE 2 DIABETES MELLITUS WITH END-STAGE RENAL DISEASE: ICD-10-CM

## 2024-12-10 DIAGNOSIS — E87.8 DILATED CARDIOMYOPATHY SECONDARY TO ELECTROLYTE DEFICIENCY: ICD-10-CM

## 2024-12-10 DIAGNOSIS — D64.9 ANEMIA, UNSPECIFIED TYPE: Primary | ICD-10-CM

## 2024-12-10 DIAGNOSIS — R80.9 PROTEINURIA, UNSPECIFIED TYPE: ICD-10-CM

## 2024-12-10 DIAGNOSIS — N18.1 CHRONIC KIDNEY DISEASE, STAGE I: ICD-10-CM

## 2024-12-10 DIAGNOSIS — E55.9 AVITAMINOSIS D: ICD-10-CM

## 2024-12-10 DIAGNOSIS — I12.9 PARENCHYMAL RENAL HYPERTENSION: ICD-10-CM

## 2024-12-10 LAB
25(OH)D3+25(OH)D2 SERPL-MCNC: 49 NG/ML (ref 30–80)
ALBUMIN SERPL-MCNC: 3.9 G/DL (ref 3.4–4.8)
ALBUMIN/GLOB SERPL: 1.3 RATIO (ref 1.1–2)
ALP SERPL-CCNC: 69 UNIT/L (ref 40–150)
ALT SERPL-CCNC: 18 UNIT/L (ref 0–55)
ANION GAP SERPL CALC-SCNC: 8 MEQ/L
AST SERPL-CCNC: 20 UNIT/L (ref 5–34)
BASOPHILS # BLD AUTO: 0.06 X10(3)/MCL
BASOPHILS NFR BLD AUTO: 0.7 %
BILIRUB SERPL-MCNC: 0.3 MG/DL
BILIRUB UR QL STRIP.AUTO: NEGATIVE
BUN SERPL-MCNC: 20 MG/DL (ref 9.8–20.1)
CALCIUM SERPL-MCNC: 10.1 MG/DL (ref 8.4–10.2)
CHLORIDE SERPL-SCNC: 105 MMOL/L (ref 98–107)
CLARITY UR: CLEAR
CO2 SERPL-SCNC: 28 MMOL/L (ref 23–31)
COLOR UR AUTO: YELLOW
CREAT SERPL-MCNC: 0.85 MG/DL (ref 0.55–1.02)
CREAT UR-MCNC: 44.9 MG/DL (ref 45–106)
CREAT/UREA NIT SERPL: 24
EOSINOPHIL # BLD AUTO: 0.3 X10(3)/MCL (ref 0–0.9)
EOSINOPHIL NFR BLD AUTO: 3.4 %
ERYTHROCYTE [DISTWIDTH] IN BLOOD BY AUTOMATED COUNT: 13.2 % (ref 11.5–17)
GFR SERPLBLD CREATININE-BSD FMLA CKD-EPI: >60 ML/MIN/1.73/M2
GLOBULIN SER-MCNC: 2.9 GM/DL (ref 2.4–3.5)
GLUCOSE SERPL-MCNC: 123 MG/DL (ref 82–115)
GLUCOSE UR QL STRIP: NEGATIVE
HCT VFR BLD AUTO: 46.3 % (ref 37–47)
HGB BLD-MCNC: 15.3 G/DL (ref 12–16)
HGB UR QL STRIP: NEGATIVE
IMM GRANULOCYTES # BLD AUTO: 0.03 X10(3)/MCL (ref 0–0.04)
IMM GRANULOCYTES NFR BLD AUTO: 0.3 %
KETONES UR QL STRIP: NEGATIVE
LEUKOCYTE ESTERASE UR QL STRIP: NEGATIVE
LYMPHOCYTES # BLD AUTO: 1.54 X10(3)/MCL (ref 0.6–4.6)
LYMPHOCYTES NFR BLD AUTO: 17.2 %
MAGNESIUM SERPL-MCNC: 2 MG/DL (ref 1.6–2.6)
MCH RBC QN AUTO: 31.1 PG (ref 27–31)
MCHC RBC AUTO-ENTMCNC: 33 G/DL (ref 33–36)
MCV RBC AUTO: 94.1 FL (ref 80–94)
MONOCYTES # BLD AUTO: 0.7 X10(3)/MCL (ref 0.1–1.3)
MONOCYTES NFR BLD AUTO: 7.8 %
NEUTROPHILS # BLD AUTO: 6.32 X10(3)/MCL (ref 2.1–9.2)
NEUTROPHILS NFR BLD AUTO: 70.6 %
NITRITE UR QL STRIP: NEGATIVE
NRBC BLD AUTO-RTO: 0 %
PH UR STRIP: 7 [PH]
PHOSPHATE SERPL-MCNC: 3.6 MG/DL (ref 2.3–4.7)
PLATELET # BLD AUTO: 204 X10(3)/MCL (ref 130–400)
PMV BLD AUTO: 10.7 FL (ref 7.4–10.4)
POTASSIUM SERPL-SCNC: 4.6 MMOL/L (ref 3.5–5.1)
PROT SERPL-MCNC: 6.8 GM/DL (ref 5.8–7.6)
PROT UR QL STRIP: NEGATIVE
PROT UR STRIP-MCNC: 12.9 MG/DL
PTH-INTACT SERPL-MCNC: 39.4 PG/ML (ref 8.7–77)
RBC # BLD AUTO: 4.92 X10(6)/MCL (ref 4.2–5.4)
SODIUM SERPL-SCNC: 141 MMOL/L (ref 136–145)
SP GR UR STRIP.AUTO: 1.01 (ref 1–1.03)
URINE PROTEIN/CREATININE RATIO (OLG): 0.3
UROBILINOGEN UR STRIP-ACNC: 0.2
WBC # BLD AUTO: 8.95 X10(3)/MCL (ref 4.5–11.5)

## 2024-12-10 PROCEDURE — 84100 ASSAY OF PHOSPHORUS: CPT

## 2024-12-10 PROCEDURE — 83970 ASSAY OF PARATHORMONE: CPT

## 2024-12-10 PROCEDURE — 85025 COMPLETE CBC W/AUTO DIFF WBC: CPT

## 2024-12-10 PROCEDURE — 81003 URINALYSIS AUTO W/O SCOPE: CPT

## 2024-12-10 PROCEDURE — 80053 COMPREHEN METABOLIC PANEL: CPT

## 2024-12-10 PROCEDURE — 82306 VITAMIN D 25 HYDROXY: CPT

## 2024-12-10 PROCEDURE — 36415 COLL VENOUS BLD VENIPUNCTURE: CPT

## 2024-12-10 PROCEDURE — 82570 ASSAY OF URINE CREATININE: CPT

## 2024-12-10 PROCEDURE — 83735 ASSAY OF MAGNESIUM: CPT

## 2025-02-05 ENCOUNTER — HOSPITAL ENCOUNTER (OUTPATIENT)
Dept: RADIOLOGY | Facility: HOSPITAL | Age: 65
Discharge: HOME | End: 2025-02-05
Attending: UROLOGY
Payer: COMMERCIAL

## 2025-02-05 DIAGNOSIS — C64.2 MALIGNANT NEOPLASM OF LEFT KIDNEY, EXCEPT RENAL PELVIS: ICD-10-CM

## 2025-02-05 PROCEDURE — 74170 CT ABD WO CNTRST FLWD CNTRST: CPT | Mod: TC

## 2025-02-05 PROCEDURE — 25500020 PHARM REV CODE 255: Performed by: UROLOGY

## 2025-02-05 RX ORDER — IOPAMIDOL 755 MG/ML
100 INJECTION, SOLUTION INTRAVASCULAR
Status: COMPLETED | OUTPATIENT
Start: 2025-02-05 | End: 2025-02-05

## 2025-02-05 RX ADMIN — IOPAMIDOL 50 ML: 755 INJECTION, SOLUTION INTRAVENOUS at 08:02

## 2025-06-19 ENCOUNTER — LAB VISIT (OUTPATIENT)
Dept: LAB | Facility: HOSPITAL | Age: 65
End: 2025-06-19
Attending: INTERNAL MEDICINE
Payer: COMMERCIAL

## 2025-06-19 DIAGNOSIS — R80.9 PROTEINURIA, UNSPECIFIED TYPE: ICD-10-CM

## 2025-06-19 DIAGNOSIS — N18.2 CKD (CHRONIC KIDNEY DISEASE) STAGE 2, GFR 60-89 ML/MIN: Primary | ICD-10-CM

## 2025-06-19 LAB
ALBUMIN SERPL-MCNC: 3.4 G/DL (ref 3.4–4.8)
BILIRUB UR QL STRIP.AUTO: NEGATIVE
BUN SERPL-MCNC: 18.6 MG/DL (ref 9.8–20.1)
CALCIUM SERPL-MCNC: 9.4 MG/DL (ref 8.4–10.2)
CHLORIDE SERPL-SCNC: 106 MMOL/L (ref 98–107)
CLARITY UR: CLEAR
CO2 SERPL-SCNC: 29 MMOL/L (ref 23–31)
COLOR UR AUTO: YELLOW
CREAT SERPL-MCNC: 0.86 MG/DL (ref 0.55–1.02)
CREAT UR-MCNC: 42 MG/DL (ref 45–106)
GFR SERPLBLD CREATININE-BSD FMLA CKD-EPI: >60 ML/MIN/1.73/M2
GLUCOSE SERPL-MCNC: 116 MG/DL (ref 82–115)
GLUCOSE UR QL STRIP: NEGATIVE
HGB UR QL STRIP: NEGATIVE
KETONES UR QL STRIP: NEGATIVE
LEUKOCYTE ESTERASE UR QL STRIP: NEGATIVE
NITRITE UR QL STRIP: NEGATIVE
PH UR STRIP: 7 [PH]
PHOSPHATE SERPL-MCNC: 4 MG/DL (ref 2.3–4.7)
POTASSIUM SERPL-SCNC: 4.6 MMOL/L (ref 3.5–5.1)
PROT UR QL STRIP: NEGATIVE
PROT UR STRIP-MCNC: 16.8 MG/DL
SODIUM SERPL-SCNC: 142 MMOL/L (ref 136–145)
SP GR UR STRIP.AUTO: 1.01 (ref 1–1.03)
URINE PROTEIN/CREATININE RATIO (OLG): 0.4
UROBILINOGEN UR STRIP-ACNC: 0.2

## 2025-06-19 PROCEDURE — 84156 ASSAY OF PROTEIN URINE: CPT

## 2025-06-19 PROCEDURE — 80069 RENAL FUNCTION PANEL: CPT

## 2025-06-19 PROCEDURE — 36415 COLL VENOUS BLD VENIPUNCTURE: CPT

## 2025-06-19 PROCEDURE — 81003 URINALYSIS AUTO W/O SCOPE: CPT

## (undated) DEVICE — CANNULA TRIPLEDAM 8.25MM 7CM

## (undated) DEVICE — PACK SHOULDER

## (undated) DEVICE — SOL NACL IRR 1000ML BTL

## (undated) DEVICE — SYR 3CC LUER LOC

## (undated) DEVICE — GLOVE SIGNATURE ESSNTL LTX 8

## (undated) DEVICE — GLOVE SENSICARE PI GRN 7.5

## (undated) DEVICE — SUPPORT ULNA NERVE PROTECTOR

## (undated) DEVICE — TRAY CATH FOL SIL URIMTR 16FR

## (undated) DEVICE — SOL .9NACL PF 100 ML

## (undated) DEVICE — SUT PROLENE 0 CT 30 IN BLUE

## (undated) DEVICE — NDL SPINAL 18GX3.5 SPINOCAN

## (undated) DEVICE — NDL SAFETY 22G X 1.5 ECLIPSE

## (undated) DEVICE — SOL IRR NACL .9% 3000ML

## (undated) DEVICE — KIT FIXATION PERC ARTHSCP 2.9

## (undated) DEVICE — GLOVE PROTEXIS HYDROGEL SZ7.5

## (undated) DEVICE — DURAPREP SURG SCRUB 26ML

## (undated) DEVICE — SET APEX ARTHSCP TUBE 1 CONN

## (undated) DEVICE — SOL NACL IRR 3000ML

## (undated) DEVICE — GLOVE PROTEXIS LTX 6.5

## (undated) DEVICE — SUT 2-0 VICRYL / SH (J417)

## (undated) DEVICE — DRAIN JACKSON PRATT TRCR 10FR

## (undated) DEVICE — SLEEVE LATERAL TRACTION ARM

## (undated) DEVICE — GLOVE PROTEXIS PI SYN SURG 7.5

## (undated) DEVICE — NDL ECLIPSE SAFETY 18GX1-1/2IN

## (undated) DEVICE — SYR 10CC LUER LOCK

## (undated) DEVICE — PAD ELECTROSURGICAL SPL W/CORD

## (undated) DEVICE — GLOVE PROTEXIS HYDROGEL SZ8

## (undated) DEVICE — DRESSING TRANS 4X4 TEGADERM

## (undated) DEVICE — GLOVE SENSICARE NEOPRENE 6.5

## (undated) DEVICE — GLOVE SIGNATURE ESSNTL LTX 6.5

## (undated) DEVICE — BLADE SURG CARBON STEEL #10

## (undated) DEVICE — STAPLER SKIN ROTATING HEAD

## (undated) DEVICE — NDL FLTR 5MCRN BLNT TIP 18GX1

## (undated) DEVICE — SEE L#159833

## (undated) DEVICE — DRAPE FLUID WARMER 44X44IN

## (undated) DEVICE — BLANKET THERMOFLECT 4X7

## (undated) DEVICE — RESERVOIR JACKSON-PRATT 100CC

## (undated) DEVICE — SYR ONLY LUER LOCK 20CC

## (undated) DEVICE — TUBE WATER AUXILIARY

## (undated) DEVICE — NDL MAGELLAN SAFETY 18G 1.5IN

## (undated) DEVICE — GLOVE PROTEXIS PI SYN SURG 6.5

## (undated) DEVICE — GOWN SMARTSLEEVE AAMI LVL4 LG

## (undated) DEVICE — DRAPE C-ARM COVER EZ 36X28IN

## (undated) DEVICE — ADAPTER AIR/WATER CHANNEL CLEA

## (undated) DEVICE — DRAPE INCISE IOBAN 2 13X13IN

## (undated) DEVICE — IMMOBILIZER SHOULDER MEDIUM

## (undated) DEVICE — DRAPE C-ARMOR EQUIPMENT COVER

## (undated) DEVICE — SEE MEDLINE ITEM 157150

## (undated) DEVICE — KIT SURGICAL COLON .25 1.1OZ

## (undated) DEVICE — GAUZE SPONGE 4X4 12PLY

## (undated) DEVICE — SHEET XLGE DRAPE

## (undated) DEVICE — SUPPORT SLING SHOT II MEDIUM

## (undated) DEVICE — GLOVE PROTEXIS BLUE LATEX 8

## (undated) DEVICE — ELECTRODE BLADE INSULATED 1 IN

## (undated) DEVICE — GLOVE PROTEXIS BLUE LATEX 7

## (undated) DEVICE — SPONGE COTTON TRAY 4X4IN

## (undated) DEVICE — KIT SURGICAL TURNOVER

## (undated) DEVICE — NDL HYPO 22GX1 1/2 SYR 10ML LL

## (undated) DEVICE — POSITIONER HEEL FOAM CONVOLTD

## (undated) DEVICE — BLADE SURG CARBON STEEL SZ11

## (undated) DEVICE — TUBE SUCTION MEDI-VAC STERILE

## (undated) DEVICE — SPNG CHERRY DISECT XRAY DTECT

## (undated) DEVICE — COVER HD BACK TABLE 6FT

## (undated) DEVICE — KIT SURGIFLO HEMOSTATIC MATRIX

## (undated) DEVICE — Device

## (undated) DEVICE — DRAPE OPMI STERILE

## (undated) DEVICE — TUBING MEDI-VAC 20FT 0.29IN

## (undated) DEVICE — SUT STRATAFIX 4-0 30CM PS-2

## (undated) DEVICE — NDL SAFETY STD LUER 18GX1.5IN

## (undated) DEVICE — GLOVE PROTEXIS PI SYN SURG 6.0

## (undated) DEVICE — GLOVE PROTEXIS BLUE LATEX 6.5

## (undated) DEVICE — MARKER WRITESITE SKIN CHLRAPRP

## (undated) DEVICE — PASSER TIGHT CRV QUICKPASS R

## (undated) DEVICE — GLOVE SENSICARE PI GRN 7

## (undated) DEVICE — ADHESIVE DERMABOND ADVANCED

## (undated) DEVICE — SUT VICRYL PLUS 3-0 SH 18IN

## (undated) DEVICE — BIT DRILL 14MM

## (undated) DEVICE — ELECTRODE PATIENT RETURN DISP

## (undated) DEVICE — SOL NS 1000CC

## (undated) DEVICE — NDL QUINCKE SPINAL YEL 20GX3IN

## (undated) DEVICE — SUT ETHILON 3-0 FS-1 30

## (undated) DEVICE — GLOVE PROTEXIS BLUE LATEX 7.5

## (undated) DEVICE — SEE MEDLINE ITEM 157166

## (undated) DEVICE — LINER SUCTION KV 5 2000ML

## (undated) DEVICE — TUBING SILICON CLR 3/16IN 10FT

## (undated) DEVICE — PASSER QUICKPASS LASSO NEEDLE

## (undated) DEVICE — POSITIONER HEAD ADULT

## (undated) DEVICE — TUBING IRR BIPOLAR CORD 12FT

## (undated) DEVICE — NDL BLUNT FILL 18G 1IN